# Patient Record
Sex: FEMALE | Race: WHITE | NOT HISPANIC OR LATINO | URBAN - METROPOLITAN AREA
[De-identification: names, ages, dates, MRNs, and addresses within clinical notes are randomized per-mention and may not be internally consistent; named-entity substitution may affect disease eponyms.]

---

## 2020-01-13 ENCOUNTER — EMERGENCY (EMERGENCY)
Facility: HOSPITAL | Age: 80
LOS: 1 days | Discharge: HOME | End: 2020-01-13
Attending: EMERGENCY MEDICINE
Payer: MEDICARE

## 2020-01-13 VITALS
HEART RATE: 99 BPM | HEIGHT: 63 IN | SYSTOLIC BLOOD PRESSURE: 112 MMHG | OXYGEN SATURATION: 97 % | RESPIRATION RATE: 16 BRPM | DIASTOLIC BLOOD PRESSURE: 68 MMHG | TEMPERATURE: 97 F | WEIGHT: 158.07 LBS

## 2020-01-13 LAB
ALBUMIN SERPL ELPH-MCNC: 3.7 G/DL — SIGNIFICANT CHANGE UP (ref 3.3–5.2)
ALP SERPL-CCNC: 106 U/L — SIGNIFICANT CHANGE UP (ref 40–120)
ALT FLD-CCNC: 14 U/L — SIGNIFICANT CHANGE UP
ANION GAP SERPL CALC-SCNC: 19 MMOL/L — HIGH (ref 5–17)
AST SERPL-CCNC: 20 U/L — SIGNIFICANT CHANGE UP
BASOPHILS # BLD AUTO: 0.02 K/UL — SIGNIFICANT CHANGE UP (ref 0–0.2)
BASOPHILS NFR BLD AUTO: 0.3 % — SIGNIFICANT CHANGE UP (ref 0–2)
BILIRUB SERPL-MCNC: 0.8 MG/DL — SIGNIFICANT CHANGE UP (ref 0.4–2)
BUN SERPL-MCNC: 24 MG/DL — HIGH (ref 8–20)
CALCIUM SERPL-MCNC: 9.6 MG/DL — SIGNIFICANT CHANGE UP (ref 8.6–10.2)
CHLORIDE SERPL-SCNC: 99 MMOL/L — SIGNIFICANT CHANGE UP (ref 98–107)
CK SERPL-CCNC: 92 U/L — SIGNIFICANT CHANGE UP (ref 25–170)
CO2 SERPL-SCNC: 24 MMOL/L — SIGNIFICANT CHANGE UP (ref 22–29)
CREAT SERPL-MCNC: 0.46 MG/DL — LOW (ref 0.5–1.3)
EOSINOPHIL # BLD AUTO: 0.03 K/UL — SIGNIFICANT CHANGE UP (ref 0–0.5)
EOSINOPHIL NFR BLD AUTO: 0.4 % — SIGNIFICANT CHANGE UP (ref 0–6)
GLUCOSE SERPL-MCNC: 237 MG/DL — HIGH (ref 70–115)
HCT VFR BLD CALC: 36.4 % — SIGNIFICANT CHANGE UP (ref 34.5–45)
HGB BLD-MCNC: 11.6 G/DL — SIGNIFICANT CHANGE UP (ref 11.5–15.5)
IMM GRANULOCYTES NFR BLD AUTO: 1.4 % — SIGNIFICANT CHANGE UP (ref 0–1.5)
LYMPHOCYTES # BLD AUTO: 0.81 K/UL — LOW (ref 1–3.3)
LYMPHOCYTES # BLD AUTO: 10.2 % — LOW (ref 13–44)
MAGNESIUM SERPL-MCNC: 1.6 MG/DL — SIGNIFICANT CHANGE UP (ref 1.6–2.6)
MCHC RBC-ENTMCNC: 27.8 PG — SIGNIFICANT CHANGE UP (ref 27–34)
MCHC RBC-ENTMCNC: 31.9 GM/DL — LOW (ref 32–36)
MCV RBC AUTO: 87.1 FL — SIGNIFICANT CHANGE UP (ref 80–100)
MONOCYTES # BLD AUTO: 0.57 K/UL — SIGNIFICANT CHANGE UP (ref 0–0.9)
MONOCYTES NFR BLD AUTO: 7.2 % — SIGNIFICANT CHANGE UP (ref 2–14)
NEUTROPHILS # BLD AUTO: 6.43 K/UL — SIGNIFICANT CHANGE UP (ref 1.8–7.4)
NEUTROPHILS NFR BLD AUTO: 80.5 % — HIGH (ref 43–77)
PLATELET # BLD AUTO: 237 K/UL — SIGNIFICANT CHANGE UP (ref 150–400)
POTASSIUM SERPL-MCNC: 3.3 MMOL/L — LOW (ref 3.5–5.3)
POTASSIUM SERPL-SCNC: 3.3 MMOL/L — LOW (ref 3.5–5.3)
PROT SERPL-MCNC: 7 G/DL — SIGNIFICANT CHANGE UP (ref 6.6–8.7)
RBC # BLD: 4.18 M/UL — SIGNIFICANT CHANGE UP (ref 3.8–5.2)
RBC # FLD: 13.7 % — SIGNIFICANT CHANGE UP (ref 10.3–14.5)
SODIUM SERPL-SCNC: 142 MMOL/L — SIGNIFICANT CHANGE UP (ref 135–145)
TROPONIN T SERPL-MCNC: <0.01 NG/ML — SIGNIFICANT CHANGE UP (ref 0–0.06)
WBC # BLD: 7.97 K/UL — SIGNIFICANT CHANGE UP (ref 3.8–10.5)
WBC # FLD AUTO: 7.97 K/UL — SIGNIFICANT CHANGE UP (ref 3.8–10.5)

## 2020-01-13 PROCEDURE — 72170 X-RAY EXAM OF PELVIS: CPT | Mod: 26

## 2020-01-13 PROCEDURE — 72195 MRI PELVIS W/O DYE: CPT | Mod: 26

## 2020-01-13 PROCEDURE — 74176 CT ABD & PELVIS W/O CONTRAST: CPT | Mod: 26

## 2020-01-13 PROCEDURE — 93010 ELECTROCARDIOGRAM REPORT: CPT

## 2020-01-13 PROCEDURE — 71045 X-RAY EXAM CHEST 1 VIEW: CPT | Mod: 26

## 2020-01-13 PROCEDURE — 99218: CPT

## 2020-01-13 RX ORDER — DEXTROSE 50 % IN WATER 50 %
25 SYRINGE (ML) INTRAVENOUS ONCE
Refills: 0 | Status: DISCONTINUED | OUTPATIENT
Start: 2020-01-13 | End: 2020-02-03

## 2020-01-13 RX ORDER — SODIUM CHLORIDE 9 MG/ML
1000 INJECTION, SOLUTION INTRAVENOUS
Refills: 0 | Status: DISCONTINUED | OUTPATIENT
Start: 2020-01-13 | End: 2020-02-03

## 2020-01-13 RX ORDER — DEXTROSE 50 % IN WATER 50 %
12.5 SYRINGE (ML) INTRAVENOUS ONCE
Refills: 0 | Status: DISCONTINUED | OUTPATIENT
Start: 2020-01-13 | End: 2020-02-03

## 2020-01-13 RX ORDER — SODIUM CHLORIDE 9 MG/ML
2000 INJECTION INTRAMUSCULAR; INTRAVENOUS; SUBCUTANEOUS ONCE
Refills: 0 | Status: COMPLETED | OUTPATIENT
Start: 2020-01-13 | End: 2020-01-13

## 2020-01-13 RX ORDER — ACETAMINOPHEN 500 MG
650 TABLET ORAL ONCE
Refills: 0 | Status: COMPLETED | OUTPATIENT
Start: 2020-01-13 | End: 2020-01-13

## 2020-01-13 RX ORDER — DEXTROSE 50 % IN WATER 50 %
15 SYRINGE (ML) INTRAVENOUS ONCE
Refills: 0 | Status: DISCONTINUED | OUTPATIENT
Start: 2020-01-13 | End: 2020-02-03

## 2020-01-13 RX ORDER — MAGNESIUM SULFATE 500 MG/ML
2 VIAL (ML) INJECTION ONCE
Refills: 0 | Status: COMPLETED | OUTPATIENT
Start: 2020-01-13 | End: 2020-01-13

## 2020-01-13 RX ORDER — INSULIN LISPRO 100/ML
VIAL (ML) SUBCUTANEOUS
Refills: 0 | Status: DISCONTINUED | OUTPATIENT
Start: 2020-01-13 | End: 2020-02-03

## 2020-01-13 RX ORDER — POTASSIUM CHLORIDE 20 MEQ
10 PACKET (EA) ORAL
Refills: 0 | Status: COMPLETED | OUTPATIENT
Start: 2020-01-13 | End: 2020-01-14

## 2020-01-13 RX ORDER — GLUCAGON INJECTION, SOLUTION 0.5 MG/.1ML
1 INJECTION, SOLUTION SUBCUTANEOUS ONCE
Refills: 0 | Status: DISCONTINUED | OUTPATIENT
Start: 2020-01-13 | End: 2020-02-03

## 2020-01-13 RX ADMIN — Medication 650 MILLIGRAM(S): at 22:00

## 2020-01-13 RX ADMIN — Medication 650 MILLIGRAM(S): at 21:01

## 2020-01-13 RX ADMIN — SODIUM CHLORIDE 2000 MILLILITER(S): 9 INJECTION INTRAMUSCULAR; INTRAVENOUS; SUBCUTANEOUS at 20:50

## 2020-01-13 NOTE — ED PROVIDER NOTE - CLINICAL SUMMARY MEDICAL DECISION MAKING FREE TEXT BOX
pt down for 4 days, nl mentation, c/o thirst, minimal pain to left abd no other pain. very dry, will check labs, cpk hydrate, reassess.

## 2020-01-13 NOTE — ED PROVIDER NOTE - OBJECTIVE STATEMENT
80 y/o female hx htn, dm, recent right hip fracture with surgery biba s/p mechanical fall 4 days ago and being unable to get help. Was in rehab for 2 months for hip fracture/surgery, d/troy home 4 days ago, fell on first day, unable to get to phone for help. States was using rollator and it slipped out from her, couldn't pickk herself up, no one else lives with her. Denies hitting head, no headache/loc, no a/c. Finally able to call 911 today. Did not eat/drink/take meds for 4 days. C/o mild pain to left upper/mid abd region. C/o bein very thirsty.    ROS: No fever/chills. No eye pain/changes in vision, No ear pain/sore throat/dysphagia, No chest pain/palpitations. No SOB/cough/. No N/V/D, no black/bloody bm. No dysuria/frequency/discharge, No headache. No Dizziness.    No rashes or breaks in skin. No numbness/tingling/weakness.

## 2020-01-13 NOTE — ED PROVIDER NOTE - PROGRESS NOTE DETAILS
Vi: pt with no pain to hip with movement, will get mri for further eval to ensure no periprosthetic fracture. dehydrated, continue fluids. pt consult given pt fell first day home. to obs.

## 2020-01-13 NOTE — ED ADULT TRIAGE NOTE - CHIEF COMPLAINT QUOTE
Pt fell 5 days ago while attempting to get off the toilet. States she did not call EMS earlier because she was "indecent." Pt states that she was able to crawl to the telephone today and call EMS. Pt has been on floor without much water, food, or medications x 5 days. Sacral redness present. Denies hitting head. C/O back pain.

## 2020-01-13 NOTE — ED CDU PROVIDER INITIAL DAY NOTE - ATTENDING CONTRIBUTION TO CARE
I agree with the PA's note and was available for any issues/concerns. I was directly involved in patient care. My brief overall assessment is as follows: obs for ortho and PT/SW to see as pt difficulty walking at home.

## 2020-01-13 NOTE — ED PROVIDER NOTE - PHYSICAL EXAMINATION
Gen: No acute distress, non toxic  HEENT: very dry mucus membranes, pink conjunctivae, EOMI. NCAT  Neck: no midline ttp  CV: RRR, nl s1/s2.  Resp: CTAB, normal rate and effort  GI: abd soft, no bruising, minimal left sided ttp, no rebound/guarding  : No CVAT  Neuro: A&O x 3, moving all 4 extremities  MSK: No spine or joint tenderness to palpation. old surgical scar right hip, no vel ttp, full rom. neurovasuclarly intact.   Skin: pressure/redness to sacral region, no full breakdown of skin.

## 2020-01-14 VITALS
SYSTOLIC BLOOD PRESSURE: 126 MMHG | HEART RATE: 95 BPM | DIASTOLIC BLOOD PRESSURE: 78 MMHG | TEMPERATURE: 99 F | OXYGEN SATURATION: 96 % | RESPIRATION RATE: 18 BRPM

## 2020-01-14 LAB
ANION GAP SERPL CALC-SCNC: 14 MMOL/L — SIGNIFICANT CHANGE UP (ref 5–17)
APPEARANCE UR: ABNORMAL
BACTERIA # UR AUTO: ABNORMAL
BILIRUB UR-MCNC: NEGATIVE — SIGNIFICANT CHANGE UP
BUN SERPL-MCNC: 20 MG/DL — SIGNIFICANT CHANGE UP (ref 8–20)
CALCIUM SERPL-MCNC: 8.9 MG/DL — SIGNIFICANT CHANGE UP (ref 8.6–10.2)
CHLORIDE SERPL-SCNC: 104 MMOL/L — SIGNIFICANT CHANGE UP (ref 98–107)
CO2 SERPL-SCNC: 24 MMOL/L — SIGNIFICANT CHANGE UP (ref 22–29)
COLOR SPEC: YELLOW — SIGNIFICANT CHANGE UP
CREAT SERPL-MCNC: 0.38 MG/DL — LOW (ref 0.5–1.3)
DIFF PNL FLD: ABNORMAL
EPI CELLS # UR: SIGNIFICANT CHANGE UP
GLUCOSE BLDC GLUCOMTR-MCNC: 166 MG/DL — HIGH (ref 70–99)
GLUCOSE BLDC GLUCOMTR-MCNC: 211 MG/DL — HIGH (ref 70–99)
GLUCOSE BLDC GLUCOMTR-MCNC: 256 MG/DL — HIGH (ref 70–99)
GLUCOSE SERPL-MCNC: 169 MG/DL — HIGH (ref 70–115)
GLUCOSE UR QL: 100 MG/DL
HBA1C BLD-MCNC: 6.6 % — HIGH (ref 4–5.6)
KETONES UR-MCNC: ABNORMAL
LEUKOCYTE ESTERASE UR-ACNC: ABNORMAL
NITRITE UR-MCNC: NEGATIVE — SIGNIFICANT CHANGE UP
PH UR: 5 — SIGNIFICANT CHANGE UP (ref 5–8)
POTASSIUM SERPL-MCNC: 3.7 MMOL/L — SIGNIFICANT CHANGE UP (ref 3.5–5.3)
POTASSIUM SERPL-SCNC: 3.7 MMOL/L — SIGNIFICANT CHANGE UP (ref 3.5–5.3)
PROT UR-MCNC: 30 MG/DL
RBC CASTS # UR COMP ASSIST: ABNORMAL /HPF (ref 0–4)
SODIUM SERPL-SCNC: 142 MMOL/L — SIGNIFICANT CHANGE UP (ref 135–145)
SP GR SPEC: 1.02 — SIGNIFICANT CHANGE UP (ref 1.01–1.02)
UROBILINOGEN FLD QL: 1 MG/DL
WBC UR QL: >50

## 2020-01-14 PROCEDURE — 80053 COMPREHEN METABOLIC PANEL: CPT

## 2020-01-14 PROCEDURE — 73552 X-RAY EXAM OF FEMUR 2/>: CPT | Mod: 26,RT

## 2020-01-14 PROCEDURE — 96366 THER/PROPH/DIAG IV INF ADDON: CPT

## 2020-01-14 PROCEDURE — 99217: CPT

## 2020-01-14 PROCEDURE — 81001 URINALYSIS AUTO W/SCOPE: CPT

## 2020-01-14 PROCEDURE — 93005 ELECTROCARDIOGRAM TRACING: CPT

## 2020-01-14 PROCEDURE — 74176 CT ABD & PELVIS W/O CONTRAST: CPT

## 2020-01-14 PROCEDURE — 82962 GLUCOSE BLOOD TEST: CPT

## 2020-01-14 PROCEDURE — 96376 TX/PRO/DX INJ SAME DRUG ADON: CPT

## 2020-01-14 PROCEDURE — 80048 BASIC METABOLIC PNL TOTAL CA: CPT

## 2020-01-14 PROCEDURE — 85027 COMPLETE CBC AUTOMATED: CPT

## 2020-01-14 PROCEDURE — 71045 X-RAY EXAM CHEST 1 VIEW: CPT

## 2020-01-14 PROCEDURE — 73501 X-RAY EXAM HIP UNI 1 VIEW: CPT

## 2020-01-14 PROCEDURE — 73552 X-RAY EXAM OF FEMUR 2/>: CPT

## 2020-01-14 PROCEDURE — 73501 X-RAY EXAM HIP UNI 1 VIEW: CPT | Mod: 26,LT,76

## 2020-01-14 PROCEDURE — 72170 X-RAY EXAM OF PELVIS: CPT

## 2020-01-14 PROCEDURE — 83036 HEMOGLOBIN GLYCOSYLATED A1C: CPT

## 2020-01-14 PROCEDURE — 82550 ASSAY OF CK (CPK): CPT

## 2020-01-14 PROCEDURE — 96365 THER/PROPH/DIAG IV INF INIT: CPT

## 2020-01-14 PROCEDURE — 72195 MRI PELVIS W/O DYE: CPT

## 2020-01-14 PROCEDURE — 96375 TX/PRO/DX INJ NEW DRUG ADDON: CPT

## 2020-01-14 PROCEDURE — 83735 ASSAY OF MAGNESIUM: CPT

## 2020-01-14 PROCEDURE — 99285 EMERGENCY DEPT VISIT HI MDM: CPT | Mod: 25

## 2020-01-14 PROCEDURE — 87186 SC STD MICRODIL/AGAR DIL: CPT

## 2020-01-14 PROCEDURE — G0378: CPT

## 2020-01-14 PROCEDURE — 87086 URINE CULTURE/COLONY COUNT: CPT

## 2020-01-14 PROCEDURE — 36415 COLL VENOUS BLD VENIPUNCTURE: CPT

## 2020-01-14 PROCEDURE — 84484 ASSAY OF TROPONIN QUANT: CPT

## 2020-01-14 RX ORDER — CEFTRIAXONE 500 MG/1
1000 INJECTION, POWDER, FOR SOLUTION INTRAMUSCULAR; INTRAVENOUS ONCE
Refills: 0 | Status: COMPLETED | OUTPATIENT
Start: 2020-01-14 | End: 2020-01-14

## 2020-01-14 RX ADMIN — Medication 2: at 06:00

## 2020-01-14 RX ADMIN — Medication 50 GRAM(S): at 00:21

## 2020-01-14 RX ADMIN — Medication 100 MILLIEQUIVALENT(S): at 00:22

## 2020-01-14 RX ADMIN — Medication 100 MILLIEQUIVALENT(S): at 01:43

## 2020-01-14 RX ADMIN — CEFTRIAXONE 1000 MILLIGRAM(S): 500 INJECTION, POWDER, FOR SOLUTION INTRAMUSCULAR; INTRAVENOUS at 18:16

## 2020-01-14 RX ADMIN — Medication 6: at 13:25

## 2020-01-14 RX ADMIN — CEFTRIAXONE 100 MILLIGRAM(S): 500 INJECTION, POWDER, FOR SOLUTION INTRAMUSCULAR; INTRAVENOUS at 12:36

## 2020-01-14 RX ADMIN — Medication 100 MILLIEQUIVALENT(S): at 02:45

## 2020-01-14 NOTE — PHYSICAL THERAPY INITIAL EVALUATION ADULT - PERTINENT HX OF CURRENT PROBLEM, REHAB EVAL
78 y/o female hx htn, dm, recent right hip fracture with surgery biba s/p mechanical fall 4 days ago and being unable to get help. Was in rehab for 2 months for hip fracture/surgery, fell on first day home after rehab.

## 2020-01-14 NOTE — PROVIDER CONTACT NOTE (OTHER) - RECOMMENDATIONS
D/C recommendation [facility]. Short term goals 92-3 visits): bed mobility min assist x1, transfers: sit to stand with RW supervision with verbal cues, Gait: Amb 75 ft with RW with contact guard

## 2020-01-14 NOTE — CHART NOTE - NSCHARTNOTEFT_GEN_A_CORE
SOCIAL WORK NOTE:  THIS WORKER CIRCULATED THE JOSE LUIS UPON COMPLETION TO MULTIPLE LOCAL FACILITES.  RECEIVED NOTIFICATION FROM SNFS THAT PATIENT HAS 35 DAYS LEFT UNDER MEDICARE AND SHE IS IN COPAYMENT DAYS.  ONLY ACCEPTING SNF AT THIS TIME IS [facility].  SPOKE WITH SIVAN IN ADMISSIONS WHOM INDICATED PATIENT WAS THERE FROM NOVEMBER 2019 THROUGH 1/9/2020 AMD THEY CAN ACCEPT PATIENT BACK.  PATIENT HAD ERRONEOUSLY STATED [facility] BUT UPON EXPLAINING HER BED OFFER, PATIENT REMEMEBERED WHERE SHE WAS AND REALLY WAS NOT HAPPY TO RETURN THERE BECAUSE SHE DID NOT LIKE THE FOOD.  CASE MANAGEMENT MET WITH PATIENT TO DISCUSS AFFINITY BEING HER ONLY BED OFFER AS SHE HAS USED SO MANY MEDICARE DAYS IN ANOTHER FACILITY. PATIENT EXPRESSED UNDERSTANDING. AMBUALNCE ARRANGED FOR 5 PM PICKUP AND TRANSPORT TO [facility].

## 2020-01-14 NOTE — ED CDU PROVIDER DISPOSITION NOTE - CARE PROVIDER_API CALL
Ramses Buitrago (DO)  Orthopaedic Surgery  [address] [place] [address]  Phone: [phone]  Fax: [phone]  Follow Up Time: 7-10 Days

## 2020-01-14 NOTE — PHYSICAL THERAPY INITIAL EVALUATION ADULT - ADDITIONAL COMMENTS
Pt. lives alone in an apartment with no stairs. Pt. was modified independent with a rollator prior to admission. Also owns a RW, shower chair, and commode.

## 2020-01-14 NOTE — ED ADULT NURSE NOTE - NSIMPLEMENTINTERV_GEN_ALL_ED
Implemented All Fall Risk Interventions:  [place] to call system. Call bell, personal items and telephone within reach. Instruct patient to call for assistance. Room bathroom lighting operational. Non-slip footwear when patient is off stretcher. Physically safe environment: no spills, clutter or unnecessary equipment. Stretcher in lowest position, wheels locked, appropriate side rails in place. Provide visual cue, wrist band, yellow gown, etc. Monitor gait and stability. Monitor for mental status changes and reorient to person, place, and time. Review medications for side effects contributing to fall risk. Reinforce activity limits and safety measures with patient and family.

## 2020-01-14 NOTE — PROGRESS NOTE ADULT - SUBJECTIVE AND OBJECTIVE BOX
Pt Name: ERIKA ORTIZ    MRN: [id number]      Patient is a 79y Female presenting to the emergency department with a chief complaint of fall.    Patient is a 79y old  Female who presents with a chief complaint of left sided pain after lying on the floor for a few days. She reports that she went to sit on a rolling walker. She does not specifically report of hip pain when questioned. She lives alone at home but was recently discharged home from inpatient rehabilitation. Patient unable to provide any clear history regarding previous orthopedic surgeries. No paperwork from home with surgical history. Probable recent intramedullary nail fixation of the right hip given recent [facility] admission.       REVIEW OF SYSTEMS    General: Alert, responsive, in NAD    Skin/Breast: No rashes, no pruritis   	  Ophthalmologic: No visual changes. No redness.   	  ENMT:	No discharge. No swelling.    Respiratory and Thorax: No difficulty breathing. No cough.  	   Cardiovascular:	No chest pain. No palpitations.    Gastrointestinal:	 No abdominal pain. No diarrhea.     Genitourinary: No dysuria. No bleeding.    Musculoskeletal: SEE HPI.    Neurological: No sensory or motor changes.     Psychiatric: No anxiety or depression.    Hematology/Lymphatics: No swelling.    Endocrine: No Hx of diabetes.        PAST MEDICAL & SURGICAL HISTORY: UNKNOWN      Allergies: No Known Allergies      Medications: cefTRIAXone   IVPB 1000 milliGRAM(s) IV Intermittent once  dextrose 40% Gel 15 Gram(s) Oral once PRN  dextrose 5%. 1000 milliLiter(s) IV Continuous <Continuous>  dextrose 50% Injectable 12.5 Gram(s) IV Push once  dextrose 50% Injectable 25 Gram(s) IV Push once  dextrose 50% Injectable 25 Gram(s) IV Push once  glucagon  Injectable 1 milliGRAM(s) IntraMuscular once PRN  insulin lispro (HumaLOG) corrective regimen sliding scale   SubCutaneous three times a day before meals      FAMILY HISTORY:  : non-contributory    Social History:     Ambulation: Walking independently [ ] With Cane [ ] With Walker [x]  Bedbound [ ]                           11.6   7.97  )-----------( 237      ( 13 Jan 2020 21:10 )             36.4       01-14    142  |  104  |  20.0  ----------------------------<  169<H>  3.7   |  24.0  |  0.38<L>    Ca    8.9      14 Jan 2020 06:48  Mg     1.6     01-13    TPro  7.0  /  Alb  3.7  /  TBili  0.8  /  DBili  x   /  AST  20  /  ALT  14  /  AlkPhos  106  01-13      Vital Signs Last 24 Hrs  T(C): 36.8 (14 Jan 2020 08:04), Max: 36.9 (14 Jan 2020 02:30)  T(F): 98.2 (14 Jan 2020 08:04), Max: 98.4 (14 Jan 2020 02:30)  HR: 91 (14 Jan 2020 08:04) (88 - 99)  BP: 118/64 (14 Jan 2020 08:04) (112/68 - 130/78)  BP(mean): --  RR: 19 (14 Jan 2020 08:04) (16 - 19)  SpO2: 94% (14 Jan 2020 08:04) (94% - 97%)    Daily Height in cm: 160.02 (13 Jan 2020 18:00)    Daily       PHYSICAL EXAM:      Appearance: Alert, responsive, in no acute distress.    Neurological: Sensation is grossly intact to light touch. 5/5 motor function of all extremities. No focal deficits or weaknesses found.    Skin:  + left hip erythema and large (6cm) abrasion c/w pressure injury from fall. No ecchymosis. No surgical scars. + Left knee scar c/w total knee arthroplasty. + healed right hip scars c/w intramedullary nail fixation. No signs of acute trauma. no rash on visible skin. Skin is clean, dry and intact. No bleeding. No abrasions. No ulcerations.    Vascular: 2+ distal pulses. Cap refill < 2 sec. No signs of venous insufficiency or stasis. No extremity ulcerations. No cyanosis.    Musculoskeletal:         Left Upper Extremity:  + NROM. Non-tender. No signs of trauma.        Right Upper Extremity:  + NROM. Non-tender. No signs of trauma.        Left Lower Extremity:  + NROM. Non-tender. No signs of acute trauma. NROM of the left hip and knee. No knee effusions. No crepitus. No hip or knee tenderness. No pain with axial loading of the hip. No calf tenderness.        Right Lower Extremity:  + NROM. Non-tender. No signs of acute trauma. NROM of the left hip and knee. No knee effusions. No crepitus. No hip or knee tenderness. No pain with axial loading of the hip. No calf tenderness.     Imaging Studies:    XR's- Pelvis & hips: + intramedullary nail fixation with IT callous formation. No acute periprosthetic hip fractures noted. No left hip Fx.     Right femur: + Right total knee arthroplasty noted. No PPFX noted.     < from: MR Pelvis Bony Only No Cont (01.13.20 @ 23:21) >     EXAM:  MR PELVIS BONY ONLY                          *** ADDENDUM 01/14/2020  ***    Addendum    Additional clinical history is provided. The team is unaware of the age of the right hip ORIF as the patient is deemed confused.    Please note the ORIF and a marrow edema is present on the right. No left hip fracture is appreciated. There is mild soft tissue edema over the left hip.    Findings were discussed with THU Dang on 1/14/2020 11:01 AM  with read back.    End addendum      *** END OF ADDENDUM 01/14/2020  ***      PROCEDURE DATE:  01/13/2020          INTERPRETATION:  MR PELVIS BONY dated 1/13/2020 11:21 PM     INDICATION: Pain after fall. History of ORIF on the right hip.    COMPARISON: Pelvis radiographs of earlier the same date. CT of the abdomen and pelvis of earlier the same date.    TECHNIQUE: Multi-sequential, multiplanar MRI imaging of the pelvis was performed per standard protocol.     FINDINGS:    BONE MARROW: The patient is status post right hip ORIF with intramedullary joy and transcervical screw in place. There is mild susceptibility artifact in keeping with the type and orientation the hardware. Given this limitation, there is edema and linear T1 signal hypointensity within the posterior aspect of the greater tuberosity suspicious for acute greater tuberosity fracture. There is linear T1 signal hypointensity and edema appears to extend towards the base of the femoral neck. There is mild to moderate bilateral narrowing of the hip joint spaces with chondral irregularity. There is mild narrowing at the symphysis.  SYNOVIUM/ JOINT FLUID: No hip joint effusion seen.  TENDONS: Suspected chronic avulsion injury of the right proximal hamstring tendon is the right proximal hamstring tendon is not visualized on this study. The left proximal fashion tendon demonstrates mild to moderate tendinosis. There is edema within the right gluteus medius tendon. The tendons are otherwise intact.  MUSCLES: Moderate atrophy of the musculature. No intramuscular hematoma.  NEUROVASCULAR STRUCTURES: Preserved  SUBCUTANEOUS SOFT TISSUES: No significant soft tissue swelling.  INTRAPELVIC SOFT TISSUES: No abnormality noted. Several scattered colonic diverticula are noted.    IMPRESSION:    1.  Linear T1 signal hypointensity withmarrow edema corresponding to areas of cortical disruption on recent CT the abdomen and pelvis. The findings are suspicious for a subacute/acute fracture. Correlate with surgical history.  2.  Old avulsion of the right proximal hamstring tendon. Moderate left proximal/tendinosis.  3.  Mild to moderate right gluteus medius tendinosis.    Findings were discussed with THU Sharma: on 1/14/2020 8:43 AM  with read back.      ***Please see the addendum at the top of this report. It may contain additionalimportant information or changes.****          MANISHA LOVE M.D., ATTENDING RADIOLOGIST  This document has been electronically signed. Jan 14 2020  8:45AM  Addend:  MANISHA LOVE M.D., ATTENDING RADIOLOGIST  This addendum was electronically signed on: Jan 14 2020 11:02AM.        A/P:  Pt is a  79y Female s/p fall found to have previous right hip intramedullary nail fixation and a right total knee arthroplasty     PLAN:   * No acute fractures are noted  * Weight bearing of LE as tolerated with use of a walker  * Office follow-up within 2 weeks

## 2020-01-14 NOTE — ED CDU PROVIDER SUBSEQUENT DAY NOTE - PROGRESS NOTE DETAILS
Pt received from [facility]. PT evaluated at bedside with Dr. De Jesus. Pt reports she fell at home, states pain is located in left hip. MRI hip with possible fx, spoke to Orthopedics PA, pending evaluation. Pt has stage 1 located in sacral area, will apply barrier cream. + UTI, will tx. Per Orthopedics, "No acute fractures are noted, WBAT with walker, f/u outpatient orthopedist 2 weeks." Pending placement per case management. THU Gonzalez NOTE: Reviewed all results with alicia Daley tx UTI with keflex, f/u orthopedist. Case management arranged placement at [facility] and transportation. Pt stable for d/c, reports improvement, VSS, tolerating PO, ambulatory.  Discussion includes results, plan, proper medication use/side effects, and return precautions. Pt advised to f/u with PMD 1-2 days and specialists discussed.  Pt given printed copies of lab/radiology for outpt follow up. Pt verbalized understanding/agreement of plan.

## 2020-01-14 NOTE — PROVIDER CONTACT NOTE (OTHER) - ASSESSMENT
As per d/w Edith ANDINO, preliminary results prior to PT eval negative for fx, however, MRI reading after eval reporting subacute vs acute fx. PA reporting x ray orders to be placed and to hold further tx until results from x ray available.

## 2020-01-14 NOTE — ED CDU PROVIDER DISPOSITION NOTE - NSFOLLOWUPINSTRUCTIONS_ED_ALL_ED_FT
Please follow up with your Primary doctor in 24-48 hr.  Seek immediate medical care for any new and/or worsening signs or symptoms.   You have a urinary tract infection, you will require antibiotics from the facility. Recommend Keflex 4 times/day for 7 days.  Follow up with Orthopedist in 2 weeks    Urinary Tract Infection    A urinary tract infection (UTI) is an infection of any part of the urinary tract, which includes the kidneys, ureters, bladder, and urethra. Risk factors include ignoring your need to urinate, wiping back to front if female, being an uncircumcised male, and having diabetes or a weak immune system. Symptoms include frequent urination, pain or burning with urination, foul smelling urine, cloudy urine, pain in the lower abdomen, blood in the urine, and fever. If you were prescribed an antibiotic medicine, take it as told by your health care provider. Do not stop taking the antibiotic even if you start to feel better.    SEEK IMMEDIATE MEDICAL CARE IF YOU HAVE ANY OF THE FOLLOWING SYMPTOMS: severe back or abdominal pain, fever, inability to keep fluids or medicine down, dizziness/lightheadedness, or a change in mental status.

## 2020-01-14 NOTE — PROVIDER CONTACT NOTE (OTHER) - BACKGROUND
Pt. educated on use of call bell for all needs/transfers with assist. Pt. with c/o 3/10 pain back pre and post session - positioned for comfort. PT will follow.

## 2020-01-14 NOTE — CHART NOTE - NSCHARTNOTEFT_GEN_A_CORE
SOCIAL WORK NOTE:  PATIENT PLACED ON OBSERVATION FOR POTENTIAL DC PLAN OF [facility].  CASE MANAGEMENT MET WITH PATIENT WHOM IS ALERT AND ORIENTED.  PATIENT REPORTED THAT SHE LIVES ALONE AND WAS RECENTLY AT [facility] FOR [facility] X 2 MONTHS.  PATIENT REPORTS THAT SHE WAS DISCHARGED TO HOME ON 1/9/2020. PATIENT REPORTS THAT SHE TOOK A FALL.  PT EVALUATION IS RECOMMENDING REHAB.  PATIENT IS RECEPTIVE TO [facility] AGAIN BUT WOULD PREFER ANOTHER LOCATION OTHER THAN [place]. PATIENT IS UNDERSTANDING IF SHE HAS TO GO BACK THERE DUE TO LARGE AMOUNT OF MEDICARE DAYS SHE HAS USED UP WITH MEDICARE.  PLAN ON HOLD AT THIS TIME AS PATIENT IS BEING WORKED UP MEDICALLY STILL.

## 2020-01-14 NOTE — ED CDU PROVIDER DISPOSITION NOTE - ATTENDING CONTRIBUTION TO CARE
I have personally performed a face to face diagnostic evaluation on this patient.  I have reviewed the ACP note and agree with the history, exam, and plan of care.

## 2020-01-14 NOTE — PROVIDER CONTACT NOTE (OTHER) - SITUATION
Chart reviewed, contents noted. MD orders received. PT evaluation completed after speaking with LEAH Polk. Pt. returned to bed in ED + call bell, in no apparent distress.

## 2020-01-14 NOTE — ED CDU PROVIDER SUBSEQUENT DAY NOTE - ATTENDING CONTRIBUTION TO CARE
78 y/o female hx htn, dm, recent right hip fracture with surgery biba s/p mechanical fall 4 days ago and being unable to get help. Was in rehab for 2 months for hip fracture/surgery, d/troy home 4 days ago, fell on first day, unable to get to phone for help. States was using rollator and it slipped out from her, couldn't pickk herself up, no one else lives with her. Denies hitting head, no headache/loc, no a/c.  Plan:  -awaiting MR results  -Ortho  -PT  -Case Management for placement

## 2020-01-14 NOTE — ED CDU PROVIDER DISPOSITION NOTE - CLINICAL COURSE
78 y/o F with PMHx DM, recent right hip fracture with surgery biba s/p mechanical fall 4 days ago and being unable to get help. Was in rehab for 2 months for hip fracture/surgery, d/troy home 4 days ago, fell on first day. CT and MRI with possible fracture, seen by Ortho, per ortho no acute fracture. Pt with UTI, will give rocephin. Pt will be discharge to a [facility], arranged by case management.

## 2020-01-14 NOTE — ED ADULT NURSE REASSESSMENT NOTE - NS ED NURSE REASSESS COMMENT FT1
Patient eating lunch, negative complaints at this time. Will monitor.
Per physical therapist, will request pt to go to in-patient rehab.
received report from josue guidry, pt laying in stretcher, no s.s of acute distress, pt awake and alert, pt safety maintained.
received report from josue guidry, pt laying in stretcher, no s/s of acute distress, pending md rivera, pt safety maintained.
Pt remains A & OX4, breathing even & unlabored, offers no complaints.
Assumed pt care at this time. Pt A & Ox4, breathing even & unlabored, pt offers no complaints waiting for PT consult, aware of plan of care.

## 2020-01-16 LAB
-  AMIKACIN: SIGNIFICANT CHANGE UP
-  AMIKACIN: SIGNIFICANT CHANGE UP
-  AMPICILLIN/SULBACTAM: SIGNIFICANT CHANGE UP
-  AMPICILLIN/SULBACTAM: SIGNIFICANT CHANGE UP
-  AMPICILLIN: SIGNIFICANT CHANGE UP
-  AMPICILLIN: SIGNIFICANT CHANGE UP
-  AZTREONAM: SIGNIFICANT CHANGE UP
-  AZTREONAM: SIGNIFICANT CHANGE UP
-  CEFAZOLIN: SIGNIFICANT CHANGE UP
-  CEFAZOLIN: SIGNIFICANT CHANGE UP
-  CEFEPIME: SIGNIFICANT CHANGE UP
-  CEFEPIME: SIGNIFICANT CHANGE UP
-  CEFOXITIN: SIGNIFICANT CHANGE UP
-  CEFOXITIN: SIGNIFICANT CHANGE UP
-  CEFTRIAXONE: SIGNIFICANT CHANGE UP
-  CEFTRIAXONE: SIGNIFICANT CHANGE UP
-  CIPROFLOXACIN: SIGNIFICANT CHANGE UP
-  CIPROFLOXACIN: SIGNIFICANT CHANGE UP
-  GENTAMICIN: SIGNIFICANT CHANGE UP
-  GENTAMICIN: SIGNIFICANT CHANGE UP
-  IMIPENEM: SIGNIFICANT CHANGE UP
-  LEVOFLOXACIN: SIGNIFICANT CHANGE UP
-  LEVOFLOXACIN: SIGNIFICANT CHANGE UP
-  MEROPENEM: SIGNIFICANT CHANGE UP
-  MEROPENEM: SIGNIFICANT CHANGE UP
-  NITROFURANTOIN: SIGNIFICANT CHANGE UP
-  NITROFURANTOIN: SIGNIFICANT CHANGE UP
-  PIPERACILLIN/TAZOBACTAM: SIGNIFICANT CHANGE UP
-  PIPERACILLIN/TAZOBACTAM: SIGNIFICANT CHANGE UP
-  TIGECYCLINE: SIGNIFICANT CHANGE UP
-  TOBRAMYCIN: SIGNIFICANT CHANGE UP
-  TOBRAMYCIN: SIGNIFICANT CHANGE UP
-  TRIMETHOPRIM/SULFAMETHOXAZOLE: SIGNIFICANT CHANGE UP
-  TRIMETHOPRIM/SULFAMETHOXAZOLE: SIGNIFICANT CHANGE UP
CULTURE RESULTS: SIGNIFICANT CHANGE UP
METHOD TYPE: SIGNIFICANT CHANGE UP
METHOD TYPE: SIGNIFICANT CHANGE UP
ORGANISM # SPEC MICROSCOPIC CNT: SIGNIFICANT CHANGE UP
SPECIMEN SOURCE: SIGNIFICANT CHANGE UP

## 2020-01-17 RX ORDER — CEPHALEXIN 500 MG
1 CAPSULE ORAL
Qty: 28 | Refills: 0
Start: 2020-01-17 | End: 2020-01-23

## 2020-01-17 NOTE — ED POST DISCHARGE NOTE - DETAILS
letter sent as per THU CUNHA pt went to [facility] with discharge papers with instructions on keflex + VARGAS, Spoke to nurse Chauhan at [facility], informed of results, will fax over sensitivities so facility can complete abx.

## 2020-01-24 NOTE — ED PROVIDER NOTE - CARE PLAN
<--- Click to Launch ICDx for PreOp, PostOp and Procedure
Principal Discharge DX:	Dehydration  Secondary Diagnosis:	Hypokalemia

## 2020-12-21 ENCOUNTER — RESULT REVIEW (OUTPATIENT)
Age: 80
End: 2020-12-21

## 2020-12-22 ENCOUNTER — INPATIENT (INPATIENT)
Facility: HOSPITAL | Age: 80
LOS: 9 days | Discharge: HOME | DRG: 287 | End: 2021-01-01
Attending: INTERNAL MEDICINE | Admitting: STUDENT IN AN ORGANIZED HEALTH CARE EDUCATION/TRAINING PROGRAM
Payer: MEDICARE

## 2020-12-22 VITALS
SYSTOLIC BLOOD PRESSURE: 102 MMHG | TEMPERATURE: 98 F | HEART RATE: 87 BPM | HEIGHT: 63 IN | OXYGEN SATURATION: 94 % | WEIGHT: 154.1 LBS | RESPIRATION RATE: 16 BRPM | DIASTOLIC BLOOD PRESSURE: 64 MMHG

## 2020-12-22 DIAGNOSIS — Z98.890 OTHER SPECIFIED POSTPROCEDURAL STATES: Chronic | ICD-10-CM

## 2020-12-22 DIAGNOSIS — I50.9 HEART FAILURE, UNSPECIFIED: ICD-10-CM

## 2020-12-22 DIAGNOSIS — Z90.49 ACQUIRED ABSENCE OF OTHER SPECIFIED PARTS OF DIGESTIVE TRACT: Chronic | ICD-10-CM

## 2020-12-22 DIAGNOSIS — J90 PLEURAL EFFUSION, NOT ELSEWHERE CLASSIFIED: ICD-10-CM

## 2020-12-22 LAB
BASOPHILS # BLD AUTO: 0.02 K/UL — SIGNIFICANT CHANGE UP (ref 0–0.2)
BASOPHILS NFR BLD AUTO: 0.3 % — SIGNIFICANT CHANGE UP (ref 0–2)
EOSINOPHIL # BLD AUTO: 0.01 K/UL — SIGNIFICANT CHANGE UP (ref 0–0.5)
EOSINOPHIL NFR BLD AUTO: 0.1 % — SIGNIFICANT CHANGE UP (ref 0–6)
HCT VFR BLD CALC: 35.3 % — SIGNIFICANT CHANGE UP (ref 34.5–45)
HGB BLD-MCNC: 10.4 G/DL — LOW (ref 11.5–15.5)
IMM GRANULOCYTES NFR BLD AUTO: 0.1 % — SIGNIFICANT CHANGE UP (ref 0–1.5)
LYMPHOCYTES # BLD AUTO: 1.07 K/UL — SIGNIFICANT CHANGE UP (ref 1–3.3)
LYMPHOCYTES # BLD AUTO: 14.1 % — SIGNIFICANT CHANGE UP (ref 13–44)
MCHC RBC-ENTMCNC: 26.2 PG — LOW (ref 27–34)
MCHC RBC-ENTMCNC: 29.5 GM/DL — LOW (ref 32–36)
MCV RBC AUTO: 88.9 FL — SIGNIFICANT CHANGE UP (ref 80–100)
MONOCYTES # BLD AUTO: 0.65 K/UL — SIGNIFICANT CHANGE UP (ref 0–0.9)
MONOCYTES NFR BLD AUTO: 8.6 % — SIGNIFICANT CHANGE UP (ref 2–14)
NEUTROPHILS # BLD AUTO: 5.83 K/UL — SIGNIFICANT CHANGE UP (ref 1.8–7.4)
NEUTROPHILS NFR BLD AUTO: 76.8 % — SIGNIFICANT CHANGE UP (ref 43–77)
PLATELET # BLD AUTO: 131 K/UL — LOW (ref 150–400)
RAPID RVP RESULT: SIGNIFICANT CHANGE UP
RBC # BLD: 3.97 M/UL — SIGNIFICANT CHANGE UP (ref 3.8–5.2)
RBC # FLD: 18 % — HIGH (ref 10.3–14.5)
SARS-COV-2 RNA SPEC QL NAA+PROBE: SIGNIFICANT CHANGE UP
WBC # BLD: 7.59 K/UL — SIGNIFICANT CHANGE UP (ref 3.8–10.5)
WBC # FLD AUTO: 7.59 K/UL — SIGNIFICANT CHANGE UP (ref 3.8–10.5)

## 2020-12-22 PROCEDURE — 99221 1ST HOSP IP/OBS SF/LOW 40: CPT

## 2020-12-22 PROCEDURE — 71250 CT THORAX DX C-: CPT | Mod: 26

## 2020-12-22 PROCEDURE — 88305 TISSUE EXAM BY PATHOLOGIST: CPT | Mod: 26

## 2020-12-22 PROCEDURE — 93010 ELECTROCARDIOGRAM REPORT: CPT

## 2020-12-22 PROCEDURE — 88112 CYTOPATH CELL ENHANCE TECH: CPT | Mod: 26

## 2020-12-22 PROCEDURE — 71046 X-RAY EXAM CHEST 2 VIEWS: CPT | Mod: 26

## 2020-12-22 PROCEDURE — 99223 1ST HOSP IP/OBS HIGH 75: CPT

## 2020-12-22 PROCEDURE — 99285 EMERGENCY DEPT VISIT HI MDM: CPT | Mod: CS

## 2020-12-22 RX ORDER — MECLIZINE HCL 12.5 MG
12.5 TABLET ORAL THREE TIMES A DAY
Refills: 0 | Status: DISCONTINUED | OUTPATIENT
Start: 2020-12-22 | End: 2020-12-26

## 2020-12-22 RX ORDER — LISINOPRIL 2.5 MG/1
2.5 TABLET ORAL DAILY
Refills: 0 | Status: DISCONTINUED | OUTPATIENT
Start: 2020-12-22 | End: 2020-12-26

## 2020-12-22 RX ORDER — PANTOPRAZOLE SODIUM 20 MG/1
40 TABLET, DELAYED RELEASE ORAL
Refills: 0 | Status: DISCONTINUED | OUTPATIENT
Start: 2020-12-22 | End: 2021-01-01

## 2020-12-22 RX ORDER — FUROSEMIDE 40 MG
40 TABLET ORAL
Refills: 0 | Status: DISCONTINUED | OUTPATIENT
Start: 2020-12-22 | End: 2020-12-31

## 2020-12-22 RX ORDER — SODIUM CHLORIDE 9 MG/ML
1000 INJECTION, SOLUTION INTRAVENOUS
Refills: 0 | Status: DISCONTINUED | OUTPATIENT
Start: 2020-12-22 | End: 2021-01-01

## 2020-12-22 RX ORDER — GLUCAGON INJECTION, SOLUTION 0.5 MG/.1ML
1 INJECTION, SOLUTION SUBCUTANEOUS ONCE
Refills: 0 | Status: DISCONTINUED | OUTPATIENT
Start: 2020-12-22 | End: 2021-01-01

## 2020-12-22 RX ORDER — ATORVASTATIN CALCIUM 80 MG/1
20 TABLET, FILM COATED ORAL AT BEDTIME
Refills: 0 | Status: DISCONTINUED | OUTPATIENT
Start: 2020-12-22 | End: 2021-01-01

## 2020-12-22 RX ORDER — FUROSEMIDE 40 MG
40 TABLET ORAL ONCE
Refills: 0 | Status: COMPLETED | OUTPATIENT
Start: 2020-12-22 | End: 2020-12-22

## 2020-12-22 RX ORDER — DEXTROSE 50 % IN WATER 50 %
15 SYRINGE (ML) INTRAVENOUS ONCE
Refills: 0 | Status: DISCONTINUED | OUTPATIENT
Start: 2020-12-22 | End: 2021-01-01

## 2020-12-22 RX ORDER — MAGNESIUM SULFATE 500 MG/ML
2 VIAL (ML) INJECTION ONCE
Refills: 0 | Status: COMPLETED | OUTPATIENT
Start: 2020-12-22 | End: 2020-12-22

## 2020-12-22 RX ORDER — OXYBUTYNIN CHLORIDE 5 MG
5 TABLET ORAL DAILY
Refills: 0 | Status: DISCONTINUED | OUTPATIENT
Start: 2020-12-22 | End: 2021-01-01

## 2020-12-22 RX ORDER — DEXTROSE 50 % IN WATER 50 %
12.5 SYRINGE (ML) INTRAVENOUS ONCE
Refills: 0 | Status: DISCONTINUED | OUTPATIENT
Start: 2020-12-22 | End: 2021-01-01

## 2020-12-22 RX ORDER — INSULIN LISPRO 100/ML
VIAL (ML) SUBCUTANEOUS
Refills: 0 | Status: DISCONTINUED | OUTPATIENT
Start: 2020-12-22 | End: 2021-01-01

## 2020-12-22 RX ORDER — DEXTROSE 50 % IN WATER 50 %
25 SYRINGE (ML) INTRAVENOUS ONCE
Refills: 0 | Status: DISCONTINUED | OUTPATIENT
Start: 2020-12-22 | End: 2021-01-01

## 2020-12-22 RX ORDER — CHOLECALCIFEROL (VITAMIN D3) 125 MCG
1000 CAPSULE ORAL DAILY
Refills: 0 | Status: DISCONTINUED | OUTPATIENT
Start: 2020-12-22 | End: 2021-01-01

## 2020-12-22 RX ORDER — METOPROLOL TARTRATE 50 MG
50 TABLET ORAL DAILY
Refills: 0 | Status: DISCONTINUED | OUTPATIENT
Start: 2020-12-22 | End: 2020-12-26

## 2020-12-22 RX ADMIN — Medication 50 GRAM(S): at 23:14

## 2020-12-22 RX ADMIN — Medication 40 MILLIGRAM(S): at 18:21

## 2020-12-22 NOTE — ED PROVIDER NOTE - PROGRESS NOTE DETAILS
Vi: pt signedo ut to me pending ct and labs. with large pleural effusion. ct surg aware. chf. [place] cards consulted. satting 90-94% on room air, no resp distress. admitted to [facility] given.

## 2020-12-22 NOTE — CONSULT NOTE ADULT - SUBJECTIVE AND OBJECTIVE BOX
Subjective:   80 year old F presented to ED with worsening shortness of breath and lower extremity swelling. CT chest reveals large pleural effusion       PAST MEDICAL & SURGICAL HISTORY:      MEDICATIONS  (STANDING):  dextrose 40% Gel 15 Gram(s) Oral once  dextrose 5%. 1000 milliLiter(s) (50 mL/Hr) IV Continuous <Continuous>  dextrose 5%. 1000 milliLiter(s) (100 mL/Hr) IV Continuous <Continuous>  dextrose 50% Injectable 25 Gram(s) IV Push once  dextrose 50% Injectable 12.5 Gram(s) IV Push once  dextrose 50% Injectable 25 Gram(s) IV Push once  furosemide   Injectable 40 milliGRAM(s) IV Push two times a day  glucagon  Injectable 1 milliGRAM(s) IntraMuscular once  insulin lispro (ADMELOG) corrective regimen sliding scale   SubCutaneous three times a day before meals  magnesium sulfate  IVPB 2 Gram(s) IV Intermittent once    MEDICATIONS  (PRN):      Allergies: No Known Allergies      SOCIAL HISTORY:  Smoker:Denies  ETOH use:Denies  Ilicit Drug use:  Denies  Assist device use: None    Relevant Family History  FAMILY HISTORY:      Review of Systems  GENERAL:  Fevers[] chills[] sweats[] fatigue[] weight loss[] weight gain []                                      [ ] NEGATIVE  NEURO:  parathesias[] seizures []  syncope []  confusion []                                                                [ ] NEGATIVE                 EYES: glasses[]  blurry vision[]  discharge[] pain[] glaucoma []                                                           [ ] NEGATIVE                 ENMT:  difficulty hearing []  vertigo[]  dysphagia[] epistaxis[] recent dental work []                     [ ] NEGATIVE                 CV:  chest pain[] palpitations[] MCCLELLAND [] diaphoresis [] edema[]                                                           [ ] NEGATIVE                                 RESPIRATORY:  wheezing[] SOB[] cough [] sputum[] hemoptysis[]                                                   [ ] NEGATIVE               GI:  nausea[]  vomiting []  diarrhea[] constipation [] melena []                                                          [ ] NEGATIVE            : hematuria[ ]  dysuria[ ] urgency[] incontinence[]                                                                          [ ] NEGATIVE                    MUSKULOSKELETAL:  arthritis[ ]  joint swelling [ ] muscle weakness [ ]                                            [ ] NEGATIVE                     SKIN/BREAST:  rash[ ] itching [ ]  hair loss[ ] masses[ ]                                                                          [ ] NEGATIVE                     PSYCH:  dementia [ ] depression [ ] anxiety[ ]                                                                                          [ ] NEGATIVE                        HEME/LYMPH:  bruises easily[ ] enlarged lymph nodes[ ] tender lymph nodes[ ]                           [ ] NEGATIVE                      ENDOCRINE:  cold intolerance[ ] heat intolerance[ ] polydipsia[ ]                                                      [ ] NEGATIVE                            PHYSICAL EXAM  General: Well nourished, well developed, no acute distress.                                                         Neuro: No cognitive impairment or sensory  deficits.                      Neck: No JVD  Lungs: Diminished on right base,                                                                   CV: Regular rate and rhythm, normal S1S2  Abdomen:  Obese,  Extremities:+4 BLE edema, cool to touch from shin to toes bilaterally                                                                           Vital Signs Last 24 Hrs  T(C): 36.9 (22 Dec 2020 22:00), Max: 36.9 (22 Dec 2020 22:00)  T(F): 98.5 (22 Dec 2020 22:00), Max: 98.5 (22 Dec 2020 22:00)  HR: 88 (22 Dec 2020 22:00) (85 - 88)  BP: 106/62 (22 Dec 2020 22:00) (102/64 - 108/60)  BP(mean): --  RR: 18 (22 Dec 2020 22:00) (16 - 18)  SpO2: 96% (22 Dec 2020 22:00) (94% - 96%) on room air                                                          LABS:                        10.4   7.59  )-----------( 131      ( 22 Dec 2020 19:09 )             35.3     12-22    138  |  97<L>  |  20.0  ----------------------------<  157<H>  3.7   |  27.0  |  0.67    Ca    8.9      22 Dec 2020 20:42  Mg     1.4     12-22    TPro  7.1  /  Alb  3.8  /  TBili  2.0  /  DBili  x   /  AST  20  /  ALT  7   /  AlkPhos  92  12-22    PT/INR - ( 22 Dec 2020 20:41 )   PT: 15.9 sec;   INR: 1.39 ratio         PTT - ( 22 Dec 2020 20:41 )  PTT:30.6 sec    CARDIAC MARKERS ( 22 Dec 2020 20:42 )  x     / 0.06 ng/mL / x     / x     / x          < from: CT Chest No Cont (12.22.20 @ 20:25) >  IMPRESSION:  Large right pleural effusion with breast skin thickening. Differential diagnosis would include mastitis versus passive congestion. Evidence of hepatic cirrhosis with ascites.    < end of copied text >             HPI: 81yo F with pmh of CHF, DM, HTN, HLD presented to ED c/o worsening SOB, b/l LE swellings and orthopnea and cough for the past several days. Pt reports to sob even at rest and the swellings in her legs has been causing her difficulty with walking. She saw her PMD who told her that she has fluid in her lung, and sent her to ED. In the ED found to have elevated BNP 30581.  Thoracic surgery was consulted after CT chest reveals large right pleural effusion. Patient denies reported SOB, MCCLELLAND and cough, denies chest pain, pressure, dizziness, headache, abdominal pain, N/V/D. Patient denies recent long distance travel or sick contact.      MEDICATIONS  (STANDING):  dextrose 40% Gel 15 Gram(s) Oral once  dextrose 5%. 1000 milliLiter(s) (50 mL/Hr) IV Continuous <Continuous>  dextrose 5%. 1000 milliLiter(s) (100 mL/Hr) IV Continuous <Continuous>  dextrose 50% Injectable 25 Gram(s) IV Push once  dextrose 50% Injectable 12.5 Gram(s) IV Push once  dextrose 50% Injectable 25 Gram(s) IV Push once  furosemide   Injectable 40 milliGRAM(s) IV Push two times a day  glucagon  Injectable 1 milliGRAM(s) IntraMuscular once  insulin lispro (ADMELOG) corrective regimen sliding scale   SubCutaneous three times a day before meals  magnesium sulfate  IVPB 2 Gram(s) IV Intermittent once    MEDICATIONS  (PRN):      Allergies: No Known Allergies      SOCIAL HISTORY:  Smoker:Denies  ETOH use:Denies  Ilicit Drug use:  Denies  Assist device use: None    Relevant Family History  FAMILY HISTORY:      Review of Systems  GENERAL:  Fevers[] chills[] sweats[] fatigue[] weight loss[] weight gain []                                      [x ] NEGATIVE  NEURO:  parathesias[] seizures []  syncope []  confusion []                                                          [ x] NEGATIVE                 EYES: glasses[]  blurry vision[]  discharge[] pain[] glaucoma []                                                    [ x] NEGATIVE                 ENMT:  difficulty hearing []  vertigo[]  dysphagia[] epistaxis[] recent dental work []                       [ ] NEGATIVE                 CV:  chest pain[] palpitations[] MCCLELLAND [x] diaphoresis [] edema[x]                                                  [ ] NEGATIVE                                 RESPIRATORY:  wheezing[] SOB[x] cough [x] sputum[] hemoptysis[]                                             [ ] NEGATIVE               GI:  nausea[]  vomiting []  diarrhea[] constipation [] melena []                                                        [ x] NEGATIVE            : hematuria[ ]  dysuria[ ] urgency[] incontinence[]                                                                      [ x] NEGATIVE                    MUSKULOSKELETAL:  arthritis[ ]  joint swelling [ ] muscle weakness [ ]                                            [ x] NEGATIVE                     SKIN/BREAST:  rash[ ] itching [ ]  hair loss[ ] masses[ ]                                                                   [x ] NEGATIVE                     PSYCH:  dementia [ ] depression [ ] anxiety[ ]                                                                                [x ] NEGATIVE                        HEME/LYMPH:  bruises easily[ ] enlarged lymph nodes[ ] tender lymph nodes[ ]                             [ x] NEGATIVE                      ENDOCRINE:  cold intolerance[ ] heat intolerance[ ] polydipsia[ ]                                                      [ ] NEGATIVE                            PHYSICAL EXAM  General: Well nourished, well developed, moderate respiratory distress noted                                                      Neuro: No cognitive impairment or sensory  deficits.                      Neck: No JVD  Lungs: Diminished on right base, rales on left base, use of accessory muscle use noted with exertion.                                                                   CV: Regular rate and rhythm, normal S1S2  Abdomen:  Obese, + ascites, non- tender, non distended, + bowel sounds  Extremities:+4 BLE edema, cool to touch from shin to toes bilaterally  Skin: Left shin with venous stasis changes, mild erythema  Psych: Appropriate mood and affect. Anxious about the chest tube insertion                                                                           Vital Signs Last 24 Hrs  T(C): 36.9 (22 Dec 2020 22:00), Max: 36.9 (22 Dec 2020 22:00)  T(F): 98.5 (22 Dec 2020 22:00), Max: 98.5 (22 Dec 2020 22:00)  HR: 88 (22 Dec 2020 22:00) (85 - 88)  BP: 106/62 (22 Dec 2020 22:00) (102/64 - 108/60)  RR: 18 (22 Dec 2020 22:00) (16 - 18)  SpO2: 96% (22 Dec 2020 22:00) (94% - 96%) on room air                                                          LABS:                        10.4   7.59  )-----------( 131      ( 22 Dec 2020 19:09 )             35.3     12-22    138  |  97<L>  |  20.0  ----------------------------<  157<H>  3.7   |  27.0  |  0.67    Ca    8.9      22 Dec 2020 20:42  Mg     1.4     12-22    TPro  7.1  /  Alb  3.8  /  TBili  2.0  /  DBili  x   /  AST  20  /  ALT  7   /  AlkPhos  92  12-22    PT/INR - ( 22 Dec 2020 20:41 )   PT: 15.9 sec;   INR: 1.39 ratio         PTT - ( 22 Dec 2020 20:41 )  PTT:30.6 sec    CARDIAC MARKERS ( 22 Dec 2020 20:42 )  x     / 0.06 ng/mL / x     / x     / x          < from: CT Chest No Cont (12.22.20 @ 20:25) >  IMPRESSION:  Large right pleural effusion with breast skin thickening. Differential diagnosis would include mastitis versus passive congestion. Evidence of hepatic cirrhosis with ascites.    < end of copied text >

## 2020-12-22 NOTE — ED PROVIDER NOTE - CLINICAL SUMMARY MEDICAL DECISION MAKING FREE TEXT BOX
patient with worsening MCCLELLAND/orthopnea and LE edema, possible CHF, no CP. will get EKG/trop/bnp. CXR. covid swab. lasix. TBA for cardiac w/u

## 2020-12-22 NOTE — CONSULT NOTE ADULT - ASSESSMENT
80 year old Female with right pleural effusion 79yo F with pmh of CHF, DM, HTN, HLD presented to ED c/o worsening SOB, b/l LE swellings and orthopnea and cough for the past several days. In the ED found to have elevated BNP 19690.  Thoracic surgery was consulted after CT chest reveals large right pleural effusion 79yo F with pmh of CHF, DM, HTN, HLD presented to ED c/o worsening SOB, b/l LE swellings and orthopnea and cough for the past several days. In the ED found to have elevated BNP 19690.  Thoracic surgery was consulted after CT chest reveals large right pleural effusion.

## 2020-12-22 NOTE — ED PROVIDER NOTE - OBJECTIVE STATEMENT
79yo F with worsening SOB on exertion for last few weeks, much worse today- SOB even at rest. +orthopnea. no CP. +wet cough. no fever/chills. worsening leg swelling making walking difficult. no falls. hasn't seen cardiologist in years. saw PCP who said 'there is fluid in my lungs' and sent to ED. no abd pain. +abd distension.    PMH- HTN, DM   PSH= hip fx

## 2020-12-22 NOTE — H&P ADULT - ASSESSMENT
81yo F with pmh of chf, DM, HTN, HLD presented to ED c/o worsening SOB, b/l LE swellings and orthopnea and cough for the past several days. Pt reports to sob even at rest and the swellings in her legs has been causing her difficulty with walking. She saw her PMD who told her that she has fluid in her lung, and sent her to ED. Denies cp, fever, chills, n/v. In the ED found to have elevated BNP 19690, CT chest with large right pleural effusion.     Acute decompensated HF   -acute fluid overload on exam  -Elevated BNP 19690, diffused b/l LE edema   -CT chest with large right Pleural effusion  -s/p 40mg IV lasix in the Ed   -cont with 40mg IV lasix BID   -strict I/O, daily weight, DASH diet   -check TTE   -PATITO cardiology consulted     Right large pleural effusion   -tolerated RA but with excessive wet cough   -CT surgery planned for right pigtail tonight   -supplement oxygen as needed   -cont with IV Lasix     Diabetes   -hold home po meds  -ISS, hypoglycemic protocol, FSG  -check A1c     HTN/HLD  -cont with Lisinopril and metoprolol with holding parameter   -cont with atorvastatin 20mg daily     DVT ppx   -hold AC for now planned pigtail     81yo F with pmh of chf, DM, HTN, HLD presented to ED c/o worsening SOB, b/l LE swellings and orthopnea and cough for the past several days. Pt reports to sob even at rest and the swellings in her legs has been causing her difficulty with walking. She saw her PMD who told her that she has fluid in her lung, and sent her to ED. Denies cp, fever, chills, n/v. In the ED found to have elevated BNP 19690, CT chest with large right pleural effusion.     Acute decompensated HF   -acute fluid overload on exam  -Elevated BNP 19690, diffused b/l LE edema   -CT chest with large right Pleural effusion  -s/p 40mg IV lasix in the Ed   -cont with 40mg IV lasix BID   -strict I/O, daily weight, DASH diet   -check TTE, check US arterial LE   -PATITO cardiology consulted     Right large pleural effusion   -tolerated RA but with excessive wet cough   -CT surgery planned for right pigtail tonight   -supplement oxygen as needed   -cont with IV Lasix     Diabetes   -hold home po meds  -ISS, hypoglycemic protocol, FSG  -check A1c     HTN/HLD  -cont with Lisinopril and metoprolol with holding parameter   -cont with atorvastatin 20mg daily     DVT ppx   -hold AC for now planned pigtail

## 2020-12-22 NOTE — H&P ADULT - HISTORY OF PRESENT ILLNESS
79yo F with pmh of chf, DM, HTN, HLD presented to ED c/o worsening SOB, b/l LE swellings and orthopnea and cough for the past several days. Pt reports to sob even at rest and the swellings in her legs has been causing her difficulty with walking. She saw her PMD who told her that she has fluid in her lung, and sent her to ED. Denies cp, fever, chills, n/v. In the ED found to have elevated BNP 19690, CT chest with large right pleural effusion.

## 2020-12-22 NOTE — CONSULT NOTE ADULT - PROBLEM SELECTOR RECOMMENDATION 9
BNP 19690  IV Lasix given BNP 19690, diffused b/l LE edema   IV Lasix 40 mg given in ED,  40mg IV Lasix BID ordered  On Lisinopril and Toprol XL  strict I/O, daily weight, DASH diet   Repeat BMP, replete electrolytes as needed  Admitted to medicine  Cardiology consult

## 2020-12-22 NOTE — H&P ADULT - NSICDXPASTMEDICALHX_GEN_ALL_CORE_FT
PAST MEDICAL HISTORY:  Acute CHF (congestive heart failure)     DM (diabetes mellitus)     Hypertension     Vertigo

## 2020-12-22 NOTE — ED PROVIDER NOTE - NS ED ROS FT
ROS: no CP +SOB. +cough. no fever. no n/v/d/c. no abd pain. no rash. no bleeding. no urinary complaints. no weakness. no vision changes. no HA. no neck/back pain. +extremity swelling/deformity. No change in mental status.

## 2020-12-22 NOTE — ED PROVIDER NOTE - PHYSICAL EXAMINATION
Gen: NAD, AOx3  Head: NCAT  HEENT: EOMI, oral mucosa moist, normal conjunctiva, neck supple  Lung: decreased BS b/l, faint crackles, mild tachypnea, speaking in full sentences   CV: rrr, no murmur, Normal perfusion  Abd: soft, NTND  MSK: +2 b/l LE pitting edema- weeping, no visible deformities  Neuro: No focal neurologic deficits  Skin: No rash   Psych: normal affect

## 2020-12-22 NOTE — ED ADULT NURSE NOTE - OBJECTIVE STATEMENT
81 y/o m a&ox3 comes to ED sent from primary MD for CHF. bilateral pitting edema noted to lower extremities. pt. c/o sob. pt. denies chest pain. pt. in no apparent distress at this time, breathing even and unlabored.

## 2020-12-22 NOTE — CONSULT NOTE ADULT - PROBLEM SELECTOR RECOMMENDATION 2
Will insert right pigtail   Discussed with Dr. Escoto CT chest with large right Pleural effusion, moderate respiratory distress on examination  Right chest tube inserted, pleural fluid sent for cytology, chemistry and culture  Post pigtail CXR with no pneumothorax  Will maintain pigtail to waterseal  Repeat CXR in AM  Discussed with Dr. Escoto

## 2020-12-22 NOTE — H&P ADULT - NSHPPHYSICALEXAM_GEN_ALL_CORE
T(C): 36.9 (12-22-20 @ 22:00), Max: 36.9 (12-22-20 @ 22:00)  HR: 88 (12-22-20 @ 22:00) (85 - 88)  BP: 106/62 (12-22-20 @ 22:00) (102/64 - 108/60)  RR: 18 (12-22-20 @ 22:00) (16 - 18)  SpO2: 96% (12-22-20 @ 22:00) (94% - 96%)    GENERAL: patient appears well, no acute distress, appropriate, pleasant  EYES: sclera clear, no exudates  ENMT: oropharynx clear without erythema, no exudates, moist mucous membranes  NECK: supple, soft, no thyromegaly noted  LUNGS: decreased breath sound right lung,+ rhonchi appreciated  HEART: soft S1/S2, regular rate and rhythm, no murmurs noted, diffuse b/l lower extremity edema   GASTROINTESTINAL: abdomen is soft, nontender, nondistended, normoactive bowel sounds, no palpable masses  INTEGUMENT: good skin turgor, warm skin, appears well perfused  MUSCULOSKELETAL: no clubbing or cyanosis, no obvious deformity  NEUROLOGIC: awake, alert, oriented x3, good muscle tone in 4 extremities, no obvious sensory deficits  PSYCHIATRIC: mood is good, affect is congruent, linear and logical thought process  HEME/LYMPH: no palpable supraclavicular nodules, no obvious ecchymosis or petechiae T(C): 36.9 (12-22-20 @ 22:00), Max: 36.9 (12-22-20 @ 22:00)  HR: 88 (12-22-20 @ 22:00) (85 - 88)  BP: 106/62 (12-22-20 @ 22:00) (102/64 - 108/60)  RR: 18 (12-22-20 @ 22:00) (16 - 18)  SpO2: 96% (12-22-20 @ 22:00) (94% - 96%)    GENERAL: patient appears well, no acute distress, appropriate, pleasant  EYES: sclera clear, no exudates  ENMT: oropharynx clear without erythema, no exudates, moist mucous membranes  NECK: supple, soft, no thyromegaly noted  LUNGS: decreased breath sound right lung,+ rhonchi appreciated  HEART: soft S1/S2, regular rate and rhythm, no murmurs noted, diffuse b/l lower extremity edema   GASTROINTESTINAL: abdomen is soft, nontender, nondistended, normoactive bowel sounds, no palpable masses  INTEGUMENT: good skin turgor, warm skin, appears well perfused  MUSCULOSKELETAL: cold b/l LE below the ankle with diminished pulse   NEUROLOGIC: awake, alert, oriented x3, good muscle tone in 4 extremities, no obvious sensory deficits  PSYCHIATRIC: mood is good, affect is congruent, linear and logical thought process  HEME/LYMPH: no palpable supraclavicular nodules, no obvious ecchymosis or petechiae

## 2020-12-22 NOTE — ED ADULT NURSE REASSESSMENT NOTE - NS ED NURSE REASSESS COMMENT FT1
Report received from day RN Pt A&Ox4 c/o shortness of breath with dread lower extremity +2 swelling noted at this time. patient breathing unlabored, lung sounds dim through out. Pt resting comfortably, VSS, no signs of distress at this time, CM in place, awaiting test results for dispo, safety maintained, call bell in reach.

## 2020-12-23 DIAGNOSIS — I10 ESSENTIAL (PRIMARY) HYPERTENSION: ICD-10-CM

## 2020-12-23 DIAGNOSIS — E11.9 TYPE 2 DIABETES MELLITUS WITHOUT COMPLICATIONS: ICD-10-CM

## 2020-12-23 LAB
A1C WITH ESTIMATED AVERAGE GLUCOSE RESULT: 6.7 % — HIGH (ref 4–5.6)
ALBUMIN FLD-MCNC: 1.5 G/DL — SIGNIFICANT CHANGE UP
ANION GAP SERPL CALC-SCNC: 12 MMOL/L — SIGNIFICANT CHANGE UP (ref 5–17)
B PERT IGG+IGM PNL SER: ABNORMAL
BUN SERPL-MCNC: 19 MG/DL — SIGNIFICANT CHANGE UP (ref 8–20)
CALCIUM SERPL-MCNC: 8.7 MG/DL — SIGNIFICANT CHANGE UP (ref 8.6–10.2)
CHLORIDE SERPL-SCNC: 97 MMOL/L — LOW (ref 98–107)
CO2 SERPL-SCNC: 29 MMOL/L — SIGNIFICANT CHANGE UP (ref 22–29)
COLOR FLD: YELLOW
CREAT SERPL-MCNC: 0.79 MG/DL — SIGNIFICANT CHANGE UP (ref 0.5–1.3)
ESTIMATED AVERAGE GLUCOSE: 146 MG/DL — HIGH (ref 68–114)
FLUID INTAKE SUBSTANCE CLASS: SIGNIFICANT CHANGE UP
FLUID SEGMENTED GRANULOCYTES: 20 % — SIGNIFICANT CHANGE UP
FOLATE+VIT B12 SERBLD-IMP: 6 % — SIGNIFICANT CHANGE UP
GLUCOSE BLDC GLUCOMTR-MCNC: 178 MG/DL — HIGH (ref 70–99)
GLUCOSE BLDC GLUCOMTR-MCNC: 205 MG/DL — HIGH (ref 70–99)
GLUCOSE BLDC GLUCOMTR-MCNC: 217 MG/DL — HIGH (ref 70–99)
GLUCOSE FLD-MCNC: 165 MG/DL — SIGNIFICANT CHANGE UP
GLUCOSE SERPL-MCNC: 217 MG/DL — HIGH (ref 70–99)
GRAM STN FLD: SIGNIFICANT CHANGE UP
HCT VFR BLD CALC: 32.9 % — LOW (ref 34.5–45)
HGB BLD-MCNC: 9.7 G/DL — LOW (ref 11.5–15.5)
LYMPHOCYTES # FLD: 66 % — SIGNIFICANT CHANGE UP
MCHC RBC-ENTMCNC: 26.3 PG — LOW (ref 27–34)
MCHC RBC-ENTMCNC: 29.5 GM/DL — LOW (ref 32–36)
MCV RBC AUTO: 89.2 FL — SIGNIFICANT CHANGE UP (ref 80–100)
MESOTHL CELL # FLD: 2 % — SIGNIFICANT CHANGE UP
MONOS+MACROS # FLD: 4 % — SIGNIFICANT CHANGE UP
NRBC # FLD: 2 % — SIGNIFICANT CHANGE UP (ref 0–0)
PH FLD: 8 — SIGNIFICANT CHANGE UP
PLATELET # BLD AUTO: 128 K/UL — LOW (ref 150–400)
POTASSIUM SERPL-MCNC: 3.4 MMOL/L — LOW (ref 3.5–5.3)
POTASSIUM SERPL-SCNC: 3.4 MMOL/L — LOW (ref 3.5–5.3)
PROT FLD-MCNC: 2.3 G/DL — SIGNIFICANT CHANGE UP
RBC # BLD: 3.69 M/UL — LOW (ref 3.8–5.2)
RBC # FLD: 18.7 % — HIGH (ref 10.3–14.5)
RCV VOL RI: 2000 /UL — HIGH (ref 0–0)
SODIUM SERPL-SCNC: 138 MMOL/L — SIGNIFICANT CHANGE UP (ref 135–145)
SPECIMEN SOURCE: SIGNIFICANT CHANGE UP
TOTAL NUCLEATED CELL COUNT, BODY FLUID: 73 /UL — SIGNIFICANT CHANGE UP
TUBE TYPE: SIGNIFICANT CHANGE UP
WBC # BLD: 6.72 K/UL — SIGNIFICANT CHANGE UP (ref 3.8–10.5)
WBC # FLD AUTO: 6.72 K/UL — SIGNIFICANT CHANGE UP (ref 3.8–10.5)

## 2020-12-23 PROCEDURE — 99233 SBSQ HOSP IP/OBS HIGH 50: CPT

## 2020-12-23 PROCEDURE — 71045 X-RAY EXAM CHEST 1 VIEW: CPT | Mod: 26,76

## 2020-12-23 PROCEDURE — 93306 TTE W/DOPPLER COMPLETE: CPT | Mod: 26

## 2020-12-23 PROCEDURE — 32551 INSERTION OF CHEST TUBE: CPT

## 2020-12-23 PROCEDURE — 99223 1ST HOSP IP/OBS HIGH 75: CPT

## 2020-12-23 PROCEDURE — 93925 LOWER EXTREMITY STUDY: CPT | Mod: 26

## 2020-12-23 PROCEDURE — 99231 SBSQ HOSP IP/OBS SF/LOW 25: CPT | Mod: 25

## 2020-12-23 PROCEDURE — 99497 ADVNCD CARE PLAN 30 MIN: CPT

## 2020-12-23 RX ORDER — LIDOCAINE HCL 20 MG/ML
1 VIAL (ML) INJECTION ONCE
Refills: 0 | Status: DISCONTINUED | OUTPATIENT
Start: 2020-12-23 | End: 2021-01-01

## 2020-12-23 RX ORDER — HYDROMORPHONE HYDROCHLORIDE 2 MG/ML
0.25 INJECTION INTRAMUSCULAR; INTRAVENOUS; SUBCUTANEOUS ONCE
Refills: 0 | Status: DISCONTINUED | OUTPATIENT
Start: 2020-12-23 | End: 2020-12-23

## 2020-12-23 RX ORDER — ASPIRIN/CALCIUM CARB/MAGNESIUM 324 MG
81 TABLET ORAL DAILY
Refills: 0 | Status: DISCONTINUED | OUTPATIENT
Start: 2020-12-23 | End: 2021-01-01

## 2020-12-23 RX ORDER — HEPARIN SODIUM 5000 [USP'U]/ML
5000 INJECTION INTRAVENOUS; SUBCUTANEOUS EVERY 12 HOURS
Refills: 0 | Status: DISCONTINUED | OUTPATIENT
Start: 2020-12-23 | End: 2021-01-01

## 2020-12-23 RX ADMIN — Medication 1000 UNIT(S): at 13:18

## 2020-12-23 RX ADMIN — LISINOPRIL 2.5 MILLIGRAM(S): 2.5 TABLET ORAL at 06:02

## 2020-12-23 RX ADMIN — HYDROMORPHONE HYDROCHLORIDE 0.25 MILLIGRAM(S): 2 INJECTION INTRAMUSCULAR; INTRAVENOUS; SUBCUTANEOUS at 03:44

## 2020-12-23 RX ADMIN — ATORVASTATIN CALCIUM 20 MILLIGRAM(S): 80 TABLET, FILM COATED ORAL at 21:35

## 2020-12-23 RX ADMIN — Medication 50 MILLIGRAM(S): at 06:03

## 2020-12-23 RX ADMIN — Medication 40 MILLIGRAM(S): at 19:21

## 2020-12-23 RX ADMIN — Medication 1 TABLET(S): at 13:18

## 2020-12-23 RX ADMIN — Medication 40 MILLIGRAM(S): at 06:02

## 2020-12-23 RX ADMIN — PANTOPRAZOLE SODIUM 40 MILLIGRAM(S): 20 TABLET, DELAYED RELEASE ORAL at 06:03

## 2020-12-23 RX ADMIN — HEPARIN SODIUM 5000 UNIT(S): 5000 INJECTION INTRAVENOUS; SUBCUTANEOUS at 19:21

## 2020-12-23 RX ADMIN — HYDROMORPHONE HYDROCHLORIDE 0.25 MILLIGRAM(S): 2 INJECTION INTRAMUSCULAR; INTRAVENOUS; SUBCUTANEOUS at 02:36

## 2020-12-23 RX ADMIN — Medication 12.5 MILLIGRAM(S): at 06:02

## 2020-12-23 RX ADMIN — Medication 5 MILLIGRAM(S): at 13:18

## 2020-12-23 RX ADMIN — Medication 2: at 19:19

## 2020-12-23 RX ADMIN — Medication 12.5 MILLIGRAM(S): at 13:23

## 2020-12-23 RX ADMIN — Medication 81 MILLIGRAM(S): at 13:18

## 2020-12-23 RX ADMIN — Medication 12.5 MILLIGRAM(S): at 21:35

## 2020-12-23 NOTE — PROGRESS NOTE ADULT - ASSESSMENT
81yo F with pmh of chf, DM, HTN, HLD presented to ED c/o worsening SOB, b/l LE swellings and orthopnea and cough for the past several days. Pt reports to sob even at rest and the swellings in her legs has been causing her difficulty with walking. She saw her PMD who told her that she has fluid in her lung, and sent her to ED. Found to have RT pleural effusion, s/p pigtail catheter insertion.

## 2020-12-23 NOTE — CONSULT NOTE ADULT - SUBJECTIVE AND OBJECTIVE BOX
CHIEF COMPLAINT: shortness of breath, leg swelling    HPI: Patient is a  80y Female with HTN, DM, aortic stenosis here with increasing dyspnea and leg swelling. PAtient lives alone, IADLs but gets help with shopping from cousin and friend. Walks with walker. For past year has noted increasing MCCLELLAND, barely able to walk across room prior to getting winded. Worse recently. No PNd/orthopnea/palps/CP/Sycnope. Has noted increasing leg swelling past few months as well. No change during time of day. Legs feel heavy and makes difficult to walk.    In ED work up done and found to have large right pleural effusion. Chest tube placed. Also started on diuretics.     PAST MEDICAL & SURGICAL HISTORY:  Vertigo    Hypertension    DM (diabetes mellitus)    Acute CHF (congestive heart failure)    S/P cholecystectomy    S/P left knee surgery        MEDICATIONS:  MEDICATIONS  (STANDING):  atorvastatin 20 milliGRAM(s) Oral at bedtime  cholecalciferol 1000 Unit(s) Oral daily  dextrose 40% Gel 15 Gram(s) Oral once  dextrose 5%. 1000 milliLiter(s) (50 mL/Hr) IV Continuous <Continuous>  dextrose 5%. 1000 milliLiter(s) (100 mL/Hr) IV Continuous <Continuous>  dextrose 50% Injectable 25 Gram(s) IV Push once  dextrose 50% Injectable 12.5 Gram(s) IV Push once  dextrose 50% Injectable 25 Gram(s) IV Push once  furosemide   Injectable 40 milliGRAM(s) IV Push two times a day  glucagon  Injectable 1 milliGRAM(s) IntraMuscular once  insulin lispro (ADMELOG) corrective regimen sliding scale   SubCutaneous three times a day before meals  lidocaine 1% (Preservative-free) Injectable 1 milliLiter(s) Local Injection once  lisinopril 2.5 milliGRAM(s) Oral daily  meclizine 12.5 milliGRAM(s) Oral three times a day  metoprolol succinate ER 50 milliGRAM(s) Oral daily  multivitamin 1 Tablet(s) Oral daily  oxybutynin 5 milliGRAM(s) Oral daily  pantoprazole    Tablet 40 milliGRAM(s) Oral before breakfast    MEDICATIONS  (PRN):    atorvastatin 20 milliGRAM(s) Oral at bedtime  cholecalciferol 1000 Unit(s) Oral daily  dextrose 40% Gel 15 Gram(s) Oral once  dextrose 5%. 1000 milliLiter(s) IV Continuous <Continuous>  dextrose 5%. 1000 milliLiter(s) IV Continuous <Continuous>  dextrose 50% Injectable 25 Gram(s) IV Push once  dextrose 50% Injectable 12.5 Gram(s) IV Push once  dextrose 50% Injectable 25 Gram(s) IV Push once  furosemide   Injectable 40 milliGRAM(s) IV Push two times a day  glucagon  Injectable 1 milliGRAM(s) IntraMuscular once  insulin lispro (ADMELOG) corrective regimen sliding scale   SubCutaneous three times a day before meals  lidocaine 1% (Preservative-free) Injectable 1 milliLiter(s) Local Injection once  lisinopril 2.5 milliGRAM(s) Oral daily  meclizine 12.5 milliGRAM(s) Oral three times a day  metoprolol succinate ER 50 milliGRAM(s) Oral daily  multivitamin 1 Tablet(s) Oral daily  oxybutynin 5 milliGRAM(s) Oral daily  pantoprazole    Tablet 40 milliGRAM(s) Oral before breakfast      FAMILY HISTORY:  FAMILY HISTORY:  FH: hypertension        SOCIAL HISTORY: no EtOH, drugs or tobacco    ROS: GI negative All others negative    PHYSCIAL EXAM:  Vital Signs Last 24 Hrs  T(C): 36.4 (23 Dec 2020 07:44), Max: 36.9 (22 Dec 2020 22:00)  T(F): 97.5 (23 Dec 2020 07:44), Max: 98.5 (22 Dec 2020 22:00)  HR: 67 (23 Dec 2020 07:44) (67 - 88)  BP: 110/72 (23 Dec 2020 07:44) (95/63 - 112/62)  BP(mean): --  RR: 18 (23 Dec 2020 07:44) (16 - 18)  SpO2: 100% (23 Dec 2020 07:44) (94% - 100%)  I&O's Summary    GEN: elderly F in NAD  HEENT: MMM, sclera anicteric  RESP: absent right base, bilateral crackles  CVS: S1, absent S2, RRR, III/VI harsh mid systolic mumur, mild JVD  GI: Soft, NT, ND, BS+  EXT: 2-3+ pitting edema bilaterally   NEURO: AAOX3  PSYCH: Normal affect    ECG: ST, low voltage, leftward axis    LABS:                        10.4   7.59  )-----------( 131      ( 22 Dec 2020 19:09 )             35.3     12-22    138  |  97<L>  |  20.0  ----------------------------<  157<H>  3.7   |  27.0  |  0.67    Ca    8.9      22 Dec 2020 20:42  Mg     1.4     12-22    TPro  7.1  /  Alb  3.8  /  TBili  2.0  /  DBili  x   /  AST  20  /  ALT  7   /  AlkPhos  92  12-22    CARDIAC MARKERS ( 22 Dec 2020 20:42 )  x     / 0.06 ng/mL / x     / x     / x          PT/INR - ( 22 Dec 2020 20:41 )   PT: 15.9 sec;   INR: 1.39 ratio         PTT - ( 22 Dec 2020 20:41 )  PTT:30.6 sec      RADIOLOGY & ADDITIONAL STUDIES:  CHEST CT 12/22/2020  IMPRESSION:  1. Large right pleural effusion with breast skin thickening. Differential diagnosis would include mastitis versus passive congestion.   2. Evidence of hepatic cirrhosis with ascites.       Assessment:     80y Female with HTN, DM, aortic stenosis here with increasing dyspnea and leg swelling due to acute congestive heart failure (EF unknown). Also with large pleural effusion on the right s/p chest tube. Suspect all related to CHF but fluid results still pending. Echo pending as well. Concern for severe AS leading to CHF, also may have significant CAD as reviewed CT and has extensive coronary artery calcifications. Explained to patient. Agreeable to further work up and invasive evaluation. Will need cath    Plan:  1. Continue IV lasix 40mg bid and monitore renal function and lytes  2. Continue low dose ACEI and beta blocker. Can cut back if needed for BP  3. ASA/statin  4. Daily weights, strict I/Os  5. 1.5 L fluid restriction  6. Will follow up echo  7. Will likely need cath, possibly tomorrow  8. Will follow, thank you       Cheng Manley MD

## 2020-12-23 NOTE — PROGRESS NOTE ADULT - ASSESSMENT
81yo F with pmh of chf, DM, HTN, HLD presented to ED c/o worsening SOB, b/l LE swellings and orthopnea and cough for the past several days. Pt reports to sob even at rest and the swellings in her legs has been causing her difficulty with walking. She saw her PMD who told her that she has fluid in her lung, and sent her to ED. Denies cp, fever, chills, n/v. In the ED found to have elevated BNP 19690, CT chest with large right pleural effusion.     Acute decompensated HF with large rt pleural effusion s/p CT placement  -Elevated BNP 19690, diffused b/l LE edema   -CT chest with large right Pleural effusion  -IV lasix BID, BB acei  -strict I/O, daily weight, DASH diet   -pending  TTE,  US arterial LE   -[facility] cardiology consulted     Right large pleural effusion  s/p chest tube   -f/u fluid analysis, ct surgery rec  -supplement oxygen as needed   -cont with IV Lasix     Diabetes   -hold home po meds  -ISS, hypoglycemic protocol, FSG  -check A1c     HTN/HLD  -cont with Lisinopril and metoprolol with holding parameter   -cont with atorvastatin 20mg daily   GI ppx ppi  DVT ppx   -heparin    care of plan dw pt. Pt is AAOX3. [facility] PT- PT WANTS TO BE FULL CODE INCLUDE INTUBATION, RESUSCITATION

## 2020-12-23 NOTE — PROCEDURE NOTE - NSPROCDETAILS_GEN_ALL_CORE
Seldinger technique/dressing applied/secured in place/sterile dressing applied/ultrasound assessment of fluid (location)

## 2020-12-23 NOTE — PROGRESS NOTE ADULT - SUBJECTIVE AND OBJECTIVE BOX
Patient is a 80y old  Female who presents with a chief complaint of Acute decompensated HF  right pleural effusion (23 Dec 2020 10:38)    Pt seen and exam. C/O rt chest pain at tube side.  Sob stable with 2-3 L oxy  No fever,chill, weakness, numbness    SUBJECTIVE / OVERNIGHT EVENTS:  REVIEW OF SYSTEMS: All systems are reviewed and found to be negative except above    MEDICATIONS  (STANDING):  aspirin enteric coated 81 milliGRAM(s) Oral daily  atorvastatin 20 milliGRAM(s) Oral at bedtime  cholecalciferol 1000 Unit(s) Oral daily  dextrose 40% Gel 15 Gram(s) Oral once  dextrose 5%. 1000 milliLiter(s) (50 mL/Hr) IV Continuous <Continuous>  dextrose 5%. 1000 milliLiter(s) (100 mL/Hr) IV Continuous <Continuous>  dextrose 50% Injectable 25 Gram(s) IV Push once  dextrose 50% Injectable 12.5 Gram(s) IV Push once  dextrose 50% Injectable 25 Gram(s) IV Push once  furosemide   Injectable 40 milliGRAM(s) IV Push two times a day  glucagon  Injectable 1 milliGRAM(s) IntraMuscular once  insulin lispro (ADMELOG) corrective regimen sliding scale   SubCutaneous three times a day before meals  lidocaine 1% (Preservative-free) Injectable 1 milliLiter(s) Local Injection once  lisinopril 2.5 milliGRAM(s) Oral daily  meclizine 12.5 milliGRAM(s) Oral three times a day  metoprolol succinate ER 50 milliGRAM(s) Oral daily  multivitamin 1 Tablet(s) Oral daily  oxybutynin 5 milliGRAM(s) Oral daily  pantoprazole    Tablet 40 milliGRAM(s) Oral before breakfast    MEDICATIONS  (PRN):      CAPILLARY BLOOD GLUCOSE        I&O's Summary      PHYSICAL EXAM:  Vital Signs Last 24 Hrs  T(C): 36.4 (23 Dec 2020 07:44), Max: 36.9 (22 Dec 2020 22:00)  T(F): 97.5 (23 Dec 2020 07:44), Max: 98.5 (22 Dec 2020 22:00)  HR: 67 (23 Dec 2020 07:44) (67 - 88)  BP: 110/72 (23 Dec 2020 07:44) (95/63 - 112/62)  BP(mean): --  RR: 18 (23 Dec 2020 07:44) (16 - 18)  SpO2: 100% (23 Dec 2020 07:44) (94% - 100%)    CONSTITUTIONAL: NAD  EYES: PERRLA; conjunctiva and sclera clear  ENMT: Moist oral mucosa,   RESPIRATORY: Normal respiratory effort; tender rt chest side, mild crackle  to auscultation bilaterally bases. + air entry lt>rt  CARDIOVASCULAR: Regular rate and rhythm, normal S1 and S2, no murmur   EXTS: +lower extremity edema; Peripheral pulses are 2+ bilaterally  ABDOMEN: Nontender to palpation, normoactive bowel sounds, no rebound/guarding;   MUSCLOSKELETAL:   no clubbing or cyanosis of digits; no joint swelling or tenderness to palpation  PSYCH: affect appropriate  NEUROLOGY: A+O to person, place, and time; CN 2-12 are intact and symmetric; no gross sensory deficits;       LABS:                        10.4   7.59  )-----------( 131      ( 22 Dec 2020 19:09 )             35.3     12-22    138  |  97<L>  |  20.0  ----------------------------<  157<H>  3.7   |  27.0  |  0.67    Ca    8.9      22 Dec 2020 20:42  Mg     1.4     12-22    TPro  7.1  /  Alb  3.8  /  TBili  2.0  /  DBili  x   /  AST  20  /  ALT  7   /  AlkPhos  92  12-22    PT/INR - ( 22 Dec 2020 20:41 )   PT: 15.9 sec;   INR: 1.39 ratio         PTT - ( 22 Dec 2020 20:41 )  PTT:30.6 sec  CARDIAC MARKERS ( 22 Dec 2020 20:42 )  x     / 0.06 ng/mL / x     / x     / x              Culture - Fungal, Body Fluid (collected 23 Dec 2020 05:22)  Source: .Body Fluid Pleural Fluid  Preliminary Report (23 Dec 2020 09:00):    Testing in progress    Culture - Body Fluid with Gram Stain (collected 23 Dec 2020 05:22)  Source: .Body Fluid Pleural Fluid  Gram Stain (23 Dec 2020 07:19):    polymorphonuclear leukocytes seen    No organisms seen    by cytocentrifuge        RADIOLOGY & ADDITIONAL TESTS:  Results Reviewed:   Imaging Personally Reviewed:  Electrocardiogram Personally Reviewed:    COORDINATION OF CARE:  Care Discussed with Consultants/Other Providers [Y/N]:  Prior or Outpatient Records Reviewed [Y/N]:

## 2020-12-23 NOTE — PROGRESS NOTE ADULT - HEIGHT IN FEET
5 I will SWITCH the dose or number of times a day I take the medications listed below when I get home from the hospital:  None

## 2020-12-23 NOTE — PROGRESS NOTE ADULT - SUBJECTIVE AND OBJECTIVE BOX
Subjective - patient seen and evaluated bedside. Sitting comfortably in bed. Denies CP, SOB, HA, dizziness, n/v/d. States "my breathing is better"         PAST MEDICAL & SURGICAL HISTORY:  Vertigo    Hypertension    DM (diabetes mellitus)    Acute CHF (congestive heart failure)    S/P cholecystectomy    S/P left knee surgery          aspirin enteric coated 81 milliGRAM(s) Oral daily  atorvastatin 20 milliGRAM(s) Oral at bedtime  cholecalciferol 1000 Unit(s) Oral daily  dextrose 40% Gel 15 Gram(s) Oral once  dextrose 5%. 1000 milliLiter(s) IV Continuous <Continuous>  dextrose 5%. 1000 milliLiter(s) IV Continuous <Continuous>  dextrose 50% Injectable 25 Gram(s) IV Push once  dextrose 50% Injectable 12.5 Gram(s) IV Push once  dextrose 50% Injectable 25 Gram(s) IV Push once  furosemide   Injectable 40 milliGRAM(s) IV Push two times a day  glucagon  Injectable 1 milliGRAM(s) IntraMuscular once  heparin   Injectable 5000 Unit(s) SubCutaneous every 12 hours  insulin lispro (ADMELOG) corrective regimen sliding scale   SubCutaneous three times a day before meals  lidocaine 1% (Preservative-free) Injectable 1 milliLiter(s) Local Injection once  lisinopril 2.5 milliGRAM(s) Oral daily  meclizine 12.5 milliGRAM(s) Oral three times a day  metoprolol succinate ER 50 milliGRAM(s) Oral daily  multivitamin 1 Tablet(s) Oral daily  oxybutynin 5 milliGRAM(s) Oral daily  pantoprazole    Tablet 40 milliGRAM(s) Oral before breakfast  MEDICATIONS  (PRN):      Daily     Daily                               9.7    6.72  )-----------( 128      ( 23 Dec 2020 14:03 )             32.9   12-23    138  |  97<L>  |  19.0  ----------------------------<  217<H>  3.4<L>   |  29.0  |  0.79    Ca    8.7      23 Dec 2020 14:03  Mg     1.4     12-22    TPro  7.1  /  Alb  3.8  /  TBili  2.0  /  DBili  x   /  AST  20  /  ALT  7   /  AlkPhos  92  12-22    CARDIAC MARKERS ( 22 Dec 2020 20:42 )  x     / 0.06 ng/mL / x     / x     / x        PT/INR - ( 22 Dec 2020 20:41 )   PT: 15.9 sec;   INR: 1.39 ratio         PTT - ( 22 Dec 2020 20:41 )  PTT:30.6 sec      Objective:  T(C): 37.1 (12-23-20 @ 15:46), Max: 37.1 (12-23-20 @ 15:46)  HR: 90 (12-23-20 @ 15:46) (67 - 90)  BP: 105/72 (12-23-20 @ 15:46) (95/63 - 112/62)  RR: 18 (12-23-20 @ 15:46) (17 - 18)  SpO2: 98% (12-23-20 @ 15:46) (95% - 100%)  Wt(kg): --CAPILLARY BLOOD GLUCOSE      POCT Blood Glucose.: 205 mg/dL (23 Dec 2020 16:23)  POCT Blood Glucose.: 217 mg/dL (23 Dec 2020 13:10)  I&O's Summary    23 Dec 2020 07:01  -  23 Dec 2020 19:09  --------------------------------------------------------  IN: 0 mL / OUT: 930 mL / NET: -930 mL        Physical Exam  Neuro: A+O x 3, non-focal, speech clear and intact  Pulm: diminished at bases bilaterally  CV: RRR, , +S1S2, no m/r/g  Abd: soft, NT, ND, +BS  Ext: +DP Pulses b/l, no edema  Chest tubes: RT pleural pigtail catheter to WS. no signs of air leak. tube at skin w/dressing c/d/i    Imaging:  CXR: < from: Xray Chest 1 View-PORTABLE IMMEDIATE (Xray Chest 1 View-PORTABLE IMMEDIATE .) (12.23.20 @ 04:16) >    INTERPRETATION:  AP chest on December 23, 2020 at 1:59 AM. Prior imaging showed bilateral basilar lung findings right greater than left. Patient had chest tube insertion.    Heart enlargement noted. There is calcification in the mitral annulus.    Catheter right chest tube is been inserted in the density at right base is markedly improved with minimal residual atelectasis. No pneumothorax is seen.    Left base findings also show improvement.    Follow-up AP chest on December 23, 2020 at 3:57 AM. Chest is stable.    IMPRESSION: Diminished right effusion after right chest tube. There are slight residual atelectatic findings a right base. Left lung also shows improvement.    < end of copied text >

## 2020-12-23 NOTE — GOALS OF CARE CONVERSATION - ADVANCED CARE PLANNING - CONVERSATION DETAILS
CHF with extensive rt pleural effusion s/p chest tube ,requiring oxygen for shortness of breath  Pt wants to intubated and resuscitation if require.  Wants everythings to be done

## 2020-12-24 PROBLEM — I10 ESSENTIAL (PRIMARY) HYPERTENSION: Chronic | Status: ACTIVE | Noted: 2020-12-22

## 2020-12-24 PROBLEM — R42 DIZZINESS AND GIDDINESS: Chronic | Status: ACTIVE | Noted: 2020-12-22

## 2020-12-24 PROBLEM — I50.9 HEART FAILURE, UNSPECIFIED: Chronic | Status: ACTIVE | Noted: 2020-12-22

## 2020-12-24 PROBLEM — E11.9 TYPE 2 DIABETES MELLITUS WITHOUT COMPLICATIONS: Chronic | Status: ACTIVE | Noted: 2020-12-22

## 2020-12-24 LAB
ANION GAP SERPL CALC-SCNC: 14 MMOL/L — SIGNIFICANT CHANGE UP (ref 5–17)
BUN SERPL-MCNC: 17 MG/DL — SIGNIFICANT CHANGE UP (ref 8–20)
CALCIUM SERPL-MCNC: 8.7 MG/DL — SIGNIFICANT CHANGE UP (ref 8.6–10.2)
CHLORIDE SERPL-SCNC: 97 MMOL/L — LOW (ref 98–107)
CO2 SERPL-SCNC: 29 MMOL/L — SIGNIFICANT CHANGE UP (ref 22–29)
CREAT SERPL-MCNC: 0.63 MG/DL — SIGNIFICANT CHANGE UP (ref 0.5–1.3)
GLUCOSE BLDC GLUCOMTR-MCNC: 156 MG/DL — HIGH (ref 70–99)
GLUCOSE BLDC GLUCOMTR-MCNC: 201 MG/DL — HIGH (ref 70–99)
GLUCOSE BLDC GLUCOMTR-MCNC: 212 MG/DL — HIGH (ref 70–99)
GLUCOSE SERPL-MCNC: 133 MG/DL — HIGH (ref 70–99)
HCT VFR BLD CALC: 33.1 % — LOW (ref 34.5–45)
HGB BLD-MCNC: 9.7 G/DL — LOW (ref 11.5–15.5)
LDH SERPL L TO P-CCNC: 76 U/L — SIGNIFICANT CHANGE UP
MCHC RBC-ENTMCNC: 26.1 PG — LOW (ref 27–34)
MCHC RBC-ENTMCNC: 29.3 GM/DL — LOW (ref 32–36)
MCV RBC AUTO: 89 FL — SIGNIFICANT CHANGE UP (ref 80–100)
PLATELET # BLD AUTO: 125 K/UL — LOW (ref 150–400)
POTASSIUM SERPL-MCNC: 4 MMOL/L — SIGNIFICANT CHANGE UP (ref 3.5–5.3)
POTASSIUM SERPL-SCNC: 4 MMOL/L — SIGNIFICANT CHANGE UP (ref 3.5–5.3)
RBC # BLD: 3.72 M/UL — LOW (ref 3.8–5.2)
RBC # FLD: 18.6 % — HIGH (ref 10.3–14.5)
SARS-COV-2 IGG SERPL QL IA: NEGATIVE — SIGNIFICANT CHANGE UP
SARS-COV-2 IGM SERPL IA-ACNC: 0.07 INDEX — SIGNIFICANT CHANGE UP
SODIUM SERPL-SCNC: 140 MMOL/L — SIGNIFICANT CHANGE UP (ref 135–145)
WBC # BLD: 6.34 K/UL — SIGNIFICANT CHANGE UP (ref 3.8–10.5)
WBC # FLD AUTO: 6.34 K/UL — SIGNIFICANT CHANGE UP (ref 3.8–10.5)

## 2020-12-24 PROCEDURE — 99233 SBSQ HOSP IP/OBS HIGH 50: CPT

## 2020-12-24 PROCEDURE — 71045 X-RAY EXAM CHEST 1 VIEW: CPT | Mod: 26

## 2020-12-24 PROCEDURE — 99232 SBSQ HOSP IP/OBS MODERATE 35: CPT

## 2020-12-24 RX ORDER — INFLUENZA VIRUS VACCINE 15; 15; 15; 15 UG/.5ML; UG/.5ML; UG/.5ML; UG/.5ML
0.5 SUSPENSION INTRAMUSCULAR ONCE
Refills: 0 | Status: DISCONTINUED | OUTPATIENT
Start: 2020-12-24 | End: 2021-01-01

## 2020-12-24 RX ADMIN — Medication 12.5 MILLIGRAM(S): at 05:03

## 2020-12-24 RX ADMIN — HEPARIN SODIUM 5000 UNIT(S): 5000 INJECTION INTRAVENOUS; SUBCUTANEOUS at 17:51

## 2020-12-24 RX ADMIN — Medication 81 MILLIGRAM(S): at 14:40

## 2020-12-24 RX ADMIN — Medication 12.5 MILLIGRAM(S): at 14:40

## 2020-12-24 RX ADMIN — Medication 40 MILLIGRAM(S): at 05:03

## 2020-12-24 RX ADMIN — PANTOPRAZOLE SODIUM 40 MILLIGRAM(S): 20 TABLET, DELAYED RELEASE ORAL at 05:03

## 2020-12-24 RX ADMIN — Medication 1: at 14:40

## 2020-12-24 RX ADMIN — Medication 1000 UNIT(S): at 14:40

## 2020-12-24 RX ADMIN — ATORVASTATIN CALCIUM 20 MILLIGRAM(S): 80 TABLET, FILM COATED ORAL at 22:13

## 2020-12-24 RX ADMIN — Medication 5 MILLIGRAM(S): at 14:40

## 2020-12-24 RX ADMIN — Medication 40 MILLIGRAM(S): at 17:51

## 2020-12-24 RX ADMIN — HEPARIN SODIUM 5000 UNIT(S): 5000 INJECTION INTRAVENOUS; SUBCUTANEOUS at 05:03

## 2020-12-24 RX ADMIN — Medication 1 TABLET(S): at 14:40

## 2020-12-24 RX ADMIN — Medication 12.5 MILLIGRAM(S): at 22:14

## 2020-12-24 RX ADMIN — Medication 2: at 18:22

## 2020-12-24 NOTE — PROGRESS NOTE ADULT - SUBJECTIVE AND OBJECTIVE BOX
Subjective: Patient c/o SOB, saturating well on 2L NC. Denies fever, chills, cough, chest pain, and dizziness.     Vital Signs:  Vital Signs Last 24 Hrs  T(C): 36.8 (12-24-20 @ 11:35), Max: 37.1 (12-23-20 @ 15:46)  T(F): 98.3 (12-24-20 @ 11:35), Max: 98.7 (12-23-20 @ 15:46)  HR: 67 (12-24-20 @ 11:35) (64 - 90)  BP: 108/69 (12-24-20 @ 11:35) (100/67 - 108/69)  RR: 19 (12-24-20 @ 11:35) (18 - 20)  SpO2: 97% (12-24-20 @ 11:35) (94% - 98%) on (O2)    I&O's Detail    23 Dec 2020 07:01  -  24 Dec 2020 07:00  --------------------------------------------------------  IN:  Total IN: 0 mL    OUT:    Chest Tube (mL): 950 mL  Total OUT: 950 mL    Total NET: -950 mL      General: NAD  Neurology: Awake, nonfocal, MASTERS x 4  Eyes: Scleras clear, PERRLA/ EOMI, Gross vision intact  ENT: Gross hearing intact, grossly patent pharynx, no stridor  Neck: Neck supple, trachea midline, No JVD  Respiratory: B/L BS  CV: S1S2, no murmurs, rubs or gallops  Abdominal: Soft, NT, ND  Extremities: No edema  Psych: Oriented x 3, normal affect  Tubes: R 14Fr PTC, to WS, no air leak, 950cc out in last 24H    Relevant labs, radiology and Medications reviewed                        9.7    6.34  )-----------( 125      ( 24 Dec 2020 09:22 )             33.1     12-24    140  |  97<L>  |  17.0  ----------------------------<  133<H>  4.0   |  29.0  |  0.63    Ca    8.7      24 Dec 2020 09:22  Mg     1.4     12-22    TPro  7.1  /  Alb  3.8  /  TBili  2.0  /  DBili  x   /  AST  20  /  ALT  7   /  AlkPhos  92  12-22    PT/INR - ( 22 Dec 2020 20:41 )   PT: 15.9 sec;   INR: 1.39 ratio         PTT - ( 22 Dec 2020 20:41 )  PTT:30.6 sec  MEDICATIONS  (STANDING):  aspirin enteric coated 81 milliGRAM(s) Oral daily  atorvastatin 20 milliGRAM(s) Oral at bedtime  cholecalciferol 1000 Unit(s) Oral daily  dextrose 40% Gel 15 Gram(s) Oral once  dextrose 5%. 1000 milliLiter(s) (50 mL/Hr) IV Continuous <Continuous>  dextrose 5%. 1000 milliLiter(s) (100 mL/Hr) IV Continuous <Continuous>  dextrose 50% Injectable 25 Gram(s) IV Push once  dextrose 50% Injectable 12.5 Gram(s) IV Push once  dextrose 50% Injectable 25 Gram(s) IV Push once  furosemide   Injectable 40 milliGRAM(s) IV Push two times a day  glucagon  Injectable 1 milliGRAM(s) IntraMuscular once  heparin   Injectable 5000 Unit(s) SubCutaneous every 12 hours  insulin lispro (ADMELOG) corrective regimen sliding scale   SubCutaneous three times a day before meals  lidocaine 1% (Preservative-free) Injectable 1 milliLiter(s) Local Injection once  lisinopril 2.5 milliGRAM(s) Oral daily  meclizine 12.5 milliGRAM(s) Oral three times a day  metoprolol succinate ER 50 milliGRAM(s) Oral daily  multivitamin 1 Tablet(s) Oral daily  oxybutynin 5 milliGRAM(s) Oral daily  pantoprazole    Tablet 40 milliGRAM(s) Oral before breakfast    MEDICATIONS  (PRN):        Assessment  80y Female  w/ PAST MEDICAL & SURGICAL HISTORY:  Vertigo    Hypertension    DM (diabetes mellitus)    Acute CHF (congestive heart failure)    S/P cholecystectomy    S/P left knee surgery    admitted with complaints of Patient is a 80y old  Female who presents with a chief complaint of Acute decompensated HF  right pleural effusion (23 Dec 2020 19:09)   patient underwent Insertion, chest tube, right pleural space            Subjective: Patient c/o SOB, saturating well on 2L NC. Denies fever, chills, cough, chest pain, and dizziness.     Vital Signs:  Vital Signs Last 24 Hrs  T(C): 36.8 (12-24-20 @ 11:35), Max: 37.1 (12-23-20 @ 15:46)  T(F): 98.3 (12-24-20 @ 11:35), Max: 98.7 (12-23-20 @ 15:46)  HR: 67 (12-24-20 @ 11:35) (64 - 90)  BP: 108/69 (12-24-20 @ 11:35) (100/67 - 108/69)  RR: 19 (12-24-20 @ 11:35) (18 - 20)  SpO2: 97% (12-24-20 @ 11:35) (94% - 98%) on (O2)    I&O's Detail    23 Dec 2020 07:01  -  24 Dec 2020 07:00  --------------------------------------------------------  IN:  Total IN: 0 mL    OUT:    Chest Tube (mL): 950 mL  Total OUT: 950 mL    Total NET: -950 mL      General: NAD  Neurology: Awake, nonfocal, MASTERS x 4  Eyes: Scleras clear, PERRLA/ EOMI, Gross vision intact  ENT: Gross hearing intact, grossly patent pharynx, no stridor  Neck: Neck supple, trachea midline, No JVD  Respiratory: B/L BS  CV: S1S2, no murmurs, rubs or gallops  Abdominal: Soft, NT, ND  Extremities: No edema  Psych: Oriented x 3, normal affect  Tubes: R 14Fr PTC, to WS, no air leak, 950cc out in last 24H, tube flushed with 10cc sterile NS for patency    Relevant labs, radiology and Medications reviewed                        9.7    6.34  )-----------( 125      ( 24 Dec 2020 09:22 )             33.1     12-24    140  |  97<L>  |  17.0  ----------------------------<  133<H>  4.0   |  29.0  |  0.63    Ca    8.7      24 Dec 2020 09:22  Mg     1.4     12-22    TPro  7.1  /  Alb  3.8  /  TBili  2.0  /  DBili  x   /  AST  20  /  ALT  7   /  AlkPhos  92  12-22    PT/INR - ( 22 Dec 2020 20:41 )   PT: 15.9 sec;   INR: 1.39 ratio         PTT - ( 22 Dec 2020 20:41 )  PTT:30.6 sec  MEDICATIONS  (STANDING):  aspirin enteric coated 81 milliGRAM(s) Oral daily  atorvastatin 20 milliGRAM(s) Oral at bedtime  cholecalciferol 1000 Unit(s) Oral daily  dextrose 40% Gel 15 Gram(s) Oral once  dextrose 5%. 1000 milliLiter(s) (50 mL/Hr) IV Continuous <Continuous>  dextrose 5%. 1000 milliLiter(s) (100 mL/Hr) IV Continuous <Continuous>  dextrose 50% Injectable 25 Gram(s) IV Push once  dextrose 50% Injectable 12.5 Gram(s) IV Push once  dextrose 50% Injectable 25 Gram(s) IV Push once  furosemide   Injectable 40 milliGRAM(s) IV Push two times a day  glucagon  Injectable 1 milliGRAM(s) IntraMuscular once  heparin   Injectable 5000 Unit(s) SubCutaneous every 12 hours  insulin lispro (ADMELOG) corrective regimen sliding scale   SubCutaneous three times a day before meals  lidocaine 1% (Preservative-free) Injectable 1 milliLiter(s) Local Injection once  lisinopril 2.5 milliGRAM(s) Oral daily  meclizine 12.5 milliGRAM(s) Oral three times a day  metoprolol succinate ER 50 milliGRAM(s) Oral daily  multivitamin 1 Tablet(s) Oral daily  oxybutynin 5 milliGRAM(s) Oral daily  pantoprazole    Tablet 40 milliGRAM(s) Oral before breakfast    MEDICATIONS  (PRN):        Assessment  80y Female  w/ PAST MEDICAL & SURGICAL HISTORY:  Vertigo    Hypertension    DM (diabetes mellitus)    Acute CHF (congestive heart failure)    S/P cholecystectomy    S/P left knee surgery    admitted with complaints of Patient is a 80y old  Female who presents with a chief complaint of Acute decompensated HF  right pleural effusion (23 Dec 2020 19:09)   patient underwent Insertion, chest tube, right pleural space

## 2020-12-24 NOTE — PROGRESS NOTE ADULT - SUBJECTIVE AND OBJECTIVE BOX
ERIKA ORTIZ  [id number]      Chief Complaint:  Acute CHF    Interval History:  Patient feels better but still with SOB with any movement.  Denies CP, palps.  Edema better but still significant    Tele:  No events      aspirin enteric coated 81 milliGRAM(s) Oral daily  atorvastatin 20 milliGRAM(s) Oral at bedtime  cholecalciferol 1000 Unit(s) Oral daily  dextrose 40% Gel 15 Gram(s) Oral once  dextrose 5%. 1000 milliLiter(s) IV Continuous <Continuous>  dextrose 5%. 1000 milliLiter(s) IV Continuous <Continuous>  dextrose 50% Injectable 25 Gram(s) IV Push once  dextrose 50% Injectable 12.5 Gram(s) IV Push once  dextrose 50% Injectable 25 Gram(s) IV Push once  furosemide   Injectable 40 milliGRAM(s) IV Push two times a day  glucagon  Injectable 1 milliGRAM(s) IntraMuscular once  heparin   Injectable 5000 Unit(s) SubCutaneous every 12 hours  insulin lispro (ADMELOG) corrective regimen sliding scale   SubCutaneous three times a day before meals  lidocaine 1% (Preservative-free) Injectable 1 milliLiter(s) Local Injection once  lisinopril 2.5 milliGRAM(s) Oral daily  meclizine 12.5 milliGRAM(s) Oral three times a day  metoprolol succinate ER 50 milliGRAM(s) Oral daily  multivitamin 1 Tablet(s) Oral daily  oxybutynin 5 milliGRAM(s) Oral daily  pantoprazole    Tablet 40 milliGRAM(s) Oral before breakfast          Physical Exam:  T(C): 36.8 (12-24-20 @ 11:35), Max: 37.1 (12-23-20 @ 15:46)  HR: 67 (12-24-20 @ 11:35) (64 - 90)  BP: 108/69 (12-24-20 @ 11:35) (100/67 - 108/69)  RR: 19 (12-24-20 @ 11:35) (18 - 20)  SpO2: 97% (12-24-20 @ 11:35) (94% - 98%)  Wt(kg): --  General: Comfortable in NAD at 30 degrees  Neck: Mild JVD  CVS: nl s1s2, no s3  Pulm: Diminished with crackles at bases b/l  Abd: soft, non-tender  Ext: 2+ LE edema b/l to thighs  Neuro A&O x3  Psych: Normal affect      Labs:   24 Dec 2020 09:22    140    |  97     |  17.0   ----------------------------<  133    4.0     |  29.0   |  0.63     Ca    8.7        24 Dec 2020 09:22  Mg     1.4       22 Dec 2020 20:42    TPro  7.1    /  Alb  3.8    /  TBili  2.0    /  DBili  x      /  AST  20     /  ALT  7      /  AlkPhos  92     22 Dec 2020 20:42                          9.7    6.34  )-----------( 125      ( 24 Dec 2020 09:22 )             33.1     PT/INR - ( 22 Dec 2020 20:41 )   PT: 15.9 sec;   INR: 1.39 ratio         PTT - ( 22 Dec 2020 20:41 )  PTT:30.6 sec  CARDIAC MARKERS ( 22 Dec 2020 20:42 )  x     / 0.06 ng/mL / x     / x     / x          Echo personally reviewed:   1. Left ventricular ejection fraction, by visual estimation, is 30 to 35%.   2. Severely decreased global left ventricular systolic function.   3. Entire septum is akinetic.   4. Elevated left atrial and left ventricular end-diastolic pressures.   5. Severely increased LV wall thickness.   6. Spectral Doppler shows pseudonormal pattern of left ventricular myocardial filling (Grade II diastolic dysfunction).   7. Mild thickening and calcification of the anterior and posterior mitral valve leaflets.   8. Moderate mitral annular calcification.   9. A mobile echodensity on the mitral valve, ?mobile calcium, cannot r/o vegetation.  10. Moderate mitral valve regurgitation.  11. Estimated pulmonary artery systolic pressure is 58.1 mmHg assuming a right atrial pressure of 8 mmHg, which is consistent with moderate pulmonary hypertension.  12. Mild aortic regurgitation.  13. Peak transaortic gradient equals 95.5 mmHg, mean transaortic gradient equals 65.0 mmHg, the calculated aortic valve area equals 0.35 cm² by the continuity equation consistent with severe aortic stenosis.  14. Dilatation of the ascending aorta.  15. Endocardial visualization was enhanced with intravenous echo contrast.          Assessment:    80y Female with HTN, DM, aortic stenosis here with increasing dyspnea and leg swelling due to acute congestive heart failure (EF unknown). Also with large pleural effusion on the right s/p chest tube. Suspect all related to CHF but fluid results still pending.   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  Also with very severe AS.  Has at least mod MR, ?relationship to AS.  Small echodensity on MV may represent Ca, but need to r/o SBE.  No other evidence of SBE.  Will need JOE.  -Improving with diuresis.      Plan:  1. Plan JOE and LHC Monday/Tuesday.  JOE schedule for Monday morning, NPO post MN Sunday.  LHC likely after that.  2. Continue IV Lasix 40mg bid and monitor renal function and lytes  3. Continue low dose ACEI and beta blocker. Will consider transition to Entresto if BP stable.  4. ASA/statin  5. Daily weights, strict I/Os  6. 1.5 L fluid restriction  7. Further decisions will be based on JOE/LHC.    D/e Dr. Graf.

## 2020-12-24 NOTE — PROGRESS NOTE ADULT - ASSESSMENT
79yo F with pmh of chf, DM, HTN, HLD presented to ED c/o worsening SOB, b/l LE swellings and orthopnea and cough for the past several days. Pt reports to sob even at rest and the swellings in her legs has been causing her difficulty with walking. CT Chest with hepatic cirrhosis and ascites. She saw her PMD who told her that she has fluid in her lung, and sent her to ED. Found to have RT pleural effusion, s/p pigtail catheter insertion. 850cc serous fluid removed. Cultures NGTD. Cytology pending.     PLAN  Pain management  Encourage coughing, deep breathing and use of incentive spirometry. Wean off supplemental oxygen as able. Daily CXR.   maintain chest tube to water seal  Record output per shift in flowsheets and record on pleurevac with date and time  Pleural effusion likely hepatic or cardiac in nature  High likelihood of recurrence, will maintain tube throughout this hospitalization and remove upon discharge  Patient not a candidate for PleurX  Recommend Medical management /optimization of liver and heart disease at this time    Discussed with Dr. Rasheed ANDINO  Thoracic Surgery

## 2020-12-24 NOTE — PROGRESS NOTE ADULT - ASSESSMENT
81yo F with pmh of chf, DM, HTN, HLD presented to ED c/o worsening SOB, b/l LE swellings and orthopnea and cough for the past several days. Pt reports to sob even at rest and the swellings in her legs has been causing her difficulty with walking. She saw her PMD who told her that she has fluid in her lung, and sent her to ED. Denies cp, fever, chills, n/v. In the ED found to have elevated BNP 19690, CT chest with large right pleural effusion.     Acute decompensated HF with large rt pleural effusion s/p CT placement  -Elevated BNP 19690, diffused b/l LE edema   -CT chest with large right Pleural effusion  - EF 35-40% on TTE yesterday  -IV lasix BID, BB acei  - strict I/O, daily weight, DASH diet   - US arterial LE   -[facility] cardiology consulted     Right large pleural effusion  s/p chest tube   -f/u fluid analysis, ct surgery rec  -supplement oxygen as needed   -cont with IV Lasix     Diabetes   -hold home po meds  -ISS, hypoglycemic protocol, FSG  -check A1c     HTN/HLD  -cont with Lisinopril and metoprolol with holding parameter   -cont with atorvastatin 20mg daily   GI ppx ppi  DVT ppx   -heparin    care of plan dw pt. Pt is AAOX3. [facility] PT- PT WANTS TO BE FULL CODE INCLUDE INTUBATION, RESUSCITATION        79yo F with pmh of chf, DM, HTN, HLD presented to ED c/o worsening SOB, b/l LE swellings and orthopnea and cough for the past several days. Pt reports to sob even at rest and the swellings in her legs has been causing her difficulty with walking. She saw her PMD who told her that she has fluid in her lung, and sent her to ED. Denies cp, fever, chills, n/v. In the ED found to have elevated BNP 19690, CT chest with large right pleural effusion.     Acute decompensated HF with large rt pleural effusion s/p CT placement  -Elevated BNP 19690, diffused b/l LE edema   -CT chest with large right Pleural effusion  - EF 35-40% on TTE yesterday  -IV lasix BID, BB acei  - strict I/O, daily weight, DASH diet   - US arterial LE       Severe AS With hypodensity MV  spoke withn cardiology. plan for JOE,Cath Monday  f/u further rec    Right large pleural effusion  s/p chest tube   -f/u fluid analysis, ct surgery rec  -supplement oxygen as needed   -cont with IV Lasix     Diabetes   -hold home po meds  -ISS, hypoglycemic protocol, FSG  -check A1c     HTN/HLD  -cont with Lisinopril and metoprolol with holding parameter   -cont with atorvastatin 20mg daily   GI ppx ppi  DVT ppx   -heparin  will resume diet as procedure cancel  care of plan dw pt. Pt is AAOX3. [facility] PT- PT WANTS TO BE FULL CODE INCLUDE INTUBATION, RESUSCITATION . Pt has friend Oliva but pt wants to make her own decision and does not want to involve her in care now.  [facility] cardiology  tarsha rn

## 2020-12-24 NOTE — PROGRESS NOTE ADULT - SUBJECTIVE AND OBJECTIVE BOX
ERIKA ORTIZ  80y  Female    Interval/overnight events/pt complaints:            ROS:  GEN: Denies fever, chills, sweats, weakness, general malaise  HEENT: Denies acute hearing or vision loss, dysphagia, sore throat  RESPIRATORY: Denies, dyspnea, orthopnea, wheeze, cough, purlent sputum or hemoptysis  CARDIAC: Denies CP, palpitations, dizziness, pedal edema  GI: Appetite good, Denies N/V, diarrhea, constipation, melena, hematochezia  : Denies difficulty urinating, dysuria, hematuria, recent menses, flank pain  NEURO: Denies headache, dizziness, numbness/tingling, weakness  MS: Denies aches/pains, loss of FROM from baseline  SKIN: Denies rash, sores/wounds, itching  ENDOCRINE: Following diabetic diet     Vital Signs Last 24 Hrs  T(C): 36.8 (24 Dec 2020 11:35), Max: 37.1 (23 Dec 2020 15:46)  T(F): 98.3 (24 Dec 2020 11:35), Max: 98.7 (23 Dec 2020 15:46)  HR: 67 (24 Dec 2020 11:35) (64 - 90)  BP: 108/69 (24 Dec 2020 11:35) (100/67 - 108/69)  BP(mean): 81 (24 Dec 2020 04:22) (78 - 81)  RR: 19 (24 Dec 2020 11:35) (18 - 20)  SpO2: 97% (24 Dec 2020 11:35) (94% - 98%)  I&O's Summary    23 Dec 2020 07:01  -  24 Dec 2020 07:00  --------------------------------------------------------  IN: 0 mL / OUT: 950 mL / NET: -950 mL        PHYSICAL EXAM:  GENERAL: NAD, nontoxic appearing  HEENT -   NECK:   CHEST/LUNG:   HEART  ABDOMEN:  EXTREMITIES:    NEURO:    SKIN:     CAPILLARY BLOOD GLUCOSE      POCT Blood Glucose.: 156 mg/dL (24 Dec 2020 14:25)  POCT Blood Glucose.: 178 mg/dL (23 Dec 2020 21:34)  POCT Blood Glucose.: 205 mg/dL (23 Dec 2020 16:23)      LABS    (12-24 @ 09:22)                      9.7  6.34 )-----------( 125                 33.1    Neutrophils = -- (--%)  Lymphocytes = -- (--%)  Eosinophils = -- (--%)  Basophils = -- (--%)  Monocytes = -- (--%)  Bands = --%    12-24    140  |  97<L>  |  17.0  ----------------------------<  133<H>  4.0   |  29.0  |  0.63    Ca    8.7      24 Dec 2020 09:22  Mg     1.4     12-22    TPro  7.1  /  Alb  3.8  /  TBili  2.0  /  DBili  x   /  AST  20  /  ALT  7   /  AlkPhos  92  12-22    ( 22 Dec 2020 20:41 )   PT: 15.9 sec;   INR: 1.39 ratio;       PTT:30.6 sec  CARDIAC MARKERS ( 22 Dec 2020 20:42 )  Trop 0.06 ng/mL / CK x     / CKMB x           (12-22 @ 19:14)  NotDetec          IMAGING    < from: Xray Chest 1 View- PORTABLE-Routine (Xray Chest 1 View- PORTABLE-Routine in AM.) (12.24.20 @ 04:53) >  IMPRESSION:   RIGHT multi-sidehole pigtail catheter overlies RIGHT lower hemithorax.  . Diffuse of lower lobe haziness indicating pleural effusion and/or basilar airspace disease.  < end of copied text >    < from: TTE Echo Complete w/o Contrast w/ Doppler (12.23.20 @ 16:48) >  Summary:   1. Left ventricular ejection fraction, by visual estimation, is 35 to 40%.   2. Moderately decreased global left ventricular systolic function.   3. Entireseptum is abnormal as described above.   4. Elevated left atrial and left ventricular end-diastolic pressures.   5. Severely increased LV wall thickness.   6. Spectral Doppler shows pseudonormal pattern of left ventricular myocardial filling (Grade II diastolic dysfunction).   7. Mild thickening and calcification of the anterior and posterior mitral valve leaflets.   8. Moderate mitral annular calcification.   9. A mobile echodensity on the mitral valve. Clinical correlations is recommended. Consider JOE if clinically indicated.  10. Moderate to severe mitral valve regurgitation.  11. Estimated pulmonary artery systolic pressure is 58.1 mmHg assuming a right atrial pressure of 8 mmHg, which is consistent with moderate pulmonary hypertension.  12. Mild aortic regurgitation.  13. Peak transaortic gradient equals 95.5 mmHg, mean transaortic gradient equals 65.0 mmHg, the calculated aortic valve area equals 0.35 cm² by the continuity equation consistent with severe aortic stenosis.  14. Dilatation of the ascending aorta.  15. Endocardial visualization was enhanced with intravenous echo contrast.  16. Case d/w Dr. Maddox at the time of the conclusion of the study.    < end of copied text >    Imaging Personally Reviewed:  [ x] YES  [ ] NO    MEDICATIONS  (STANDING):  aspirin enteric coated 81 milliGRAM(s) Oral daily  atorvastatin 20 milliGRAM(s) Oral at bedtime  cholecalciferol 1000 Unit(s) Oral daily  dextrose 40% Gel 15 Gram(s) Oral once  dextrose 5%. 1000 milliLiter(s) (50 mL/Hr) IV Continuous <Continuous>  dextrose 5%. 1000 milliLiter(s) (100 mL/Hr) IV Continuous <Continuous>  dextrose 50% Injectable 25 Gram(s) IV Push once  dextrose 50% Injectable 12.5 Gram(s) IV Push once  dextrose 50% Injectable 25 Gram(s) IV Push once  furosemide   Injectable 40 milliGRAM(s) IV Push two times a day  glucagon  Injectable 1 milliGRAM(s) IntraMuscular once  heparin   Injectable 5000 Unit(s) SubCutaneous every 12 hours  insulin lispro (ADMELOG) corrective regimen sliding scale   SubCutaneous three times a day before meals  lidocaine 1% (Preservative-free) Injectable 1 milliLiter(s) Local Injection once  lisinopril 2.5 milliGRAM(s) Oral daily  meclizine 12.5 milliGRAM(s) Oral three times a day  metoprolol succinate ER 50 milliGRAM(s) Oral daily  multivitamin 1 Tablet(s) Oral daily  oxybutynin 5 milliGRAM(s) Oral daily  pantoprazole    Tablet 40 milliGRAM(s) Oral before breakfast    MEDICATIONS  (PRN):    AllergiesNo Known Allergies      Consultant(s) Notes Reviewed:  [x ] YES  [ ] NO  Care Discussed with Consultants/Other Providers [ x] YES  [ ] NO     pt is c/o Chest tube side pain, breathing stable with NC. no palpitation,cough,fever,chill,legs pain          ROS:  GEN: Denies fever, chills, sweats, weakness, general malaise  HEENT: Denies acute hearing or vision loss, dysphagia, sore throat  RESPIRATORY: Denies, dyspnea, orthopnea, wheeze, cough, purlent sputum or hemoptysis  CARDIAC: Denies CP, palpitations, dizziness, pedal edema  GI: Appetite good, Denies N/V, diarrhea, constipation, melena, hematochezia  : Denies difficulty urinating, dysuria, hematuria, recent menses, flank pain  NEURO: Denies headache, dizziness, numbness/tingling, weakness  MS: Denies aches/pains, loss of FROM from baseline  SKIN: Denies rash, sores/wounds, itching  ENDOCRINE: Following diabetic diet     Vital Signs Last 24 Hrs  T(C): 36.8 (24 Dec 2020 11:35), Max: 37.1 (23 Dec 2020 15:46)  T(F): 98.3 (24 Dec 2020 11:35), Max: 98.7 (23 Dec 2020 15:46)  HR: 67 (24 Dec 2020 11:35) (64 - 90)  BP: 108/69 (24 Dec 2020 11:35) (100/67 - 108/69)  BP(mean): 81 (24 Dec 2020 04:22) (78 - 81)  RR: 19 (24 Dec 2020 11:35) (18 - 20)  SpO2: 97% (24 Dec 2020 11:35) (94% - 98%)  I&O's Summary    23 Dec 2020 07:01  -  24 Dec 2020 07:00  --------------------------------------------------------  IN: 0 mL / OUT: 950 mL / NET: -950 mL        T(C): 36.8 (12-24-20 @ 11:35), Max: 37.1 (12-23-20 @ 15:46)  HR: 67 (12-24-20 @ 11:35) (64 - 90)  BP: 108/69 (12-24-20 @ 11:35) (100/67 - 108/69)  RR: 19 (12-24-20 @ 11:35) (18 - 20)  SpO2: 97% (12-24-20 @ 11:35) (94% - 98%)    GEN - NAD  HEENT - NCAT, EOMI, CHADWICK,   RESP -+ airentry. rhonchi b/l bases. decrease breath sound rt chest. chest tube placed.   CARDIO - NS1S2, RRR. No murmurs/rubs/gallops.  ABD - Soft/Non tender/Non distended. Normal BS x4 quadrants.   Ext - + YOUSUF.   MSK - full ROM of BL upper and lower extremities without pain or restriction. BL 5/5 strength on upper and lower extremities.   Neuro - cn 2-12 grossly intact.  no visible seizure activity appreciated. no tremor. gait not observed.     Psych- AAOx3.  attentive. normal affect.      POCT Blood Glucose.: 156 mg/dL (24 Dec 2020 14:25)  POCT Blood Glucose.: 178 mg/dL (23 Dec 2020 21:34)  POCT Blood Glucose.: 205 mg/dL (23 Dec 2020 16:23)      LABS    (12-24 @ 09:22)                      9.7  6.34 )-----------( 125                 33.1    Neutrophils = -- (--%)  Lymphocytes = -- (--%)  Eosinophils = -- (--%)  Basophils = -- (--%)  Monocytes = -- (--%)  Bands = --%    12-24    140  |  97<L>  |  17.0  ----------------------------<  133<H>  4.0   |  29.0  |  0.63    Ca    8.7      24 Dec 2020 09:22  Mg     1.4     12-22    TPro  7.1  /  Alb  3.8  /  TBili  2.0  /  DBili  x   /  AST  20  /  ALT  7   /  AlkPhos  92  12-22    ( 22 Dec 2020 20:41 )   PT: 15.9 sec;   INR: 1.39 ratio;       PTT:30.6 sec  CARDIAC MARKERS ( 22 Dec 2020 20:42 )  Trop 0.06 ng/mL / CK x     / CKMB x           (12-22 @ 19:14)  NotDetec          IMAGING    < from: Xray Chest 1 View- PORTABLE-Routine (Xray Chest 1 View- PORTABLE-Routine in AM.) (12.24.20 @ 04:53) >  IMPRESSION:   RIGHT multi-sidehole pigtail catheter overlies RIGHT lower hemithorax.  . Diffuse of lower lobe haziness indicating pleural effusion and/or basilar airspace disease.  < end of copied text >    < from: TTE Echo Complete w/o Contrast w/ Doppler (12.23.20 @ 16:48) >  Summary:   1. Left ventricular ejection fraction, by visual estimation, is 35 to 40%.   2. Moderately decreased global left ventricular systolic function.   3. Entireseptum is abnormal as described above.   4. Elevated left atrial and left ventricular end-diastolic pressures.   5. Severely increased LV wall thickness.   6. Spectral Doppler shows pseudonormal pattern of left ventricular myocardial filling (Grade II diastolic dysfunction).   7. Mild thickening and calcification of the anterior and posterior mitral valve leaflets.   8. Moderate mitral annular calcification.   9. A mobile echodensity on the mitral valve. Clinical correlations is recommended. Consider JOE if clinically indicated.  10. Moderate to severe mitral valve regurgitation.  11. Estimated pulmonary artery systolic pressure is 58.1 mmHg assuming a right atrial pressure of 8 mmHg, which is consistent with moderate pulmonary hypertension.  12. Mild aortic regurgitation.  13. Peak transaortic gradient equals 95.5 mmHg, mean transaortic gradient equals 65.0 mmHg, the calculated aortic valve area equals 0.35 cm² by the continuity equation consistent with severe aortic stenosis.  14. Dilatation of the ascending aorta.  15. Endocardial visualization was enhanced with intravenous echo contrast.  16. Case d/w Dr. Maddox at the time of the conclusion of the study.    < end of copied text >    Imaging Personally Reviewed:  [ x] YES  [ ] NO    MEDICATIONS  (STANDING):  aspirin enteric coated 81 milliGRAM(s) Oral daily  atorvastatin 20 milliGRAM(s) Oral at bedtime  cholecalciferol 1000 Unit(s) Oral daily  dextrose 40% Gel 15 Gram(s) Oral once  dextrose 5%. 1000 milliLiter(s) (50 mL/Hr) IV Continuous <Continuous>  dextrose 5%. 1000 milliLiter(s) (100 mL/Hr) IV Continuous <Continuous>  dextrose 50% Injectable 25 Gram(s) IV Push once  dextrose 50% Injectable 12.5 Gram(s) IV Push once  dextrose 50% Injectable 25 Gram(s) IV Push once  furosemide   Injectable 40 milliGRAM(s) IV Push two times a day  glucagon  Injectable 1 milliGRAM(s) IntraMuscular once  heparin   Injectable 5000 Unit(s) SubCutaneous every 12 hours  insulin lispro (ADMELOG) corrective regimen sliding scale   SubCutaneous three times a day before meals  lidocaine 1% (Preservative-free) Injectable 1 milliLiter(s) Local Injection once  lisinopril 2.5 milliGRAM(s) Oral daily  meclizine 12.5 milliGRAM(s) Oral three times a day  metoprolol succinate ER 50 milliGRAM(s) Oral daily  multivitamin 1 Tablet(s) Oral daily  oxybutynin 5 milliGRAM(s) Oral daily  pantoprazole    Tablet 40 milliGRAM(s) Oral before breakfast    MEDICATIONS  (PRN):    AllergiesNo Known Allergies      Consultant(s) Notes Reviewed:  [x ] YES  [ ] NO  Care Discussed with Consultants/Other Providers [ x] YES  [ ] NO

## 2020-12-25 LAB
ANION GAP SERPL CALC-SCNC: 13 MMOL/L — SIGNIFICANT CHANGE UP (ref 5–17)
BUN SERPL-MCNC: 16 MG/DL — SIGNIFICANT CHANGE UP (ref 8–20)
CALCIUM SERPL-MCNC: 8.7 MG/DL — SIGNIFICANT CHANGE UP (ref 8.6–10.2)
CHLORIDE SERPL-SCNC: 98 MMOL/L — SIGNIFICANT CHANGE UP (ref 98–107)
CO2 SERPL-SCNC: 29 MMOL/L — SIGNIFICANT CHANGE UP (ref 22–29)
CREAT SERPL-MCNC: 0.51 MG/DL — SIGNIFICANT CHANGE UP (ref 0.5–1.3)
GLUCOSE BLDC GLUCOMTR-MCNC: 164 MG/DL — HIGH (ref 70–99)
GLUCOSE BLDC GLUCOMTR-MCNC: 205 MG/DL — HIGH (ref 70–99)
GLUCOSE BLDC GLUCOMTR-MCNC: 207 MG/DL — HIGH (ref 70–99)
GLUCOSE BLDC GLUCOMTR-MCNC: 215 MG/DL — HIGH (ref 70–99)
GLUCOSE SERPL-MCNC: 145 MG/DL — HIGH (ref 70–99)
HCT VFR BLD CALC: 31.3 % — LOW (ref 34.5–45)
HGB BLD-MCNC: 9.4 G/DL — LOW (ref 11.5–15.5)
MCHC RBC-ENTMCNC: 26.6 PG — LOW (ref 27–34)
MCHC RBC-ENTMCNC: 30 GM/DL — LOW (ref 32–36)
MCV RBC AUTO: 88.7 FL — SIGNIFICANT CHANGE UP (ref 80–100)
PLATELET # BLD AUTO: 117 K/UL — LOW (ref 150–400)
POTASSIUM SERPL-MCNC: 3.1 MMOL/L — LOW (ref 3.5–5.3)
POTASSIUM SERPL-SCNC: 3.1 MMOL/L — LOW (ref 3.5–5.3)
RBC # BLD: 3.53 M/UL — LOW (ref 3.8–5.2)
RBC # FLD: 18.5 % — HIGH (ref 10.3–14.5)
SODIUM SERPL-SCNC: 140 MMOL/L — SIGNIFICANT CHANGE UP (ref 135–145)
WBC # BLD: 6.76 K/UL — SIGNIFICANT CHANGE UP (ref 3.8–10.5)
WBC # FLD AUTO: 6.76 K/UL — SIGNIFICANT CHANGE UP (ref 3.8–10.5)

## 2020-12-25 PROCEDURE — 71045 X-RAY EXAM CHEST 1 VIEW: CPT | Mod: 26

## 2020-12-25 PROCEDURE — 99233 SBSQ HOSP IP/OBS HIGH 50: CPT

## 2020-12-25 PROCEDURE — 99232 SBSQ HOSP IP/OBS MODERATE 35: CPT

## 2020-12-25 RX ORDER — POTASSIUM CHLORIDE 20 MEQ
40 PACKET (EA) ORAL ONCE
Refills: 0 | Status: COMPLETED | OUTPATIENT
Start: 2020-12-25 | End: 2020-12-25

## 2020-12-25 RX ORDER — ACETAMINOPHEN 500 MG
1000 TABLET ORAL ONCE
Refills: 0 | Status: COMPLETED | OUTPATIENT
Start: 2020-12-25 | End: 2020-12-25

## 2020-12-25 RX ADMIN — Medication 50 MILLIGRAM(S): at 05:12

## 2020-12-25 RX ADMIN — HEPARIN SODIUM 5000 UNIT(S): 5000 INJECTION INTRAVENOUS; SUBCUTANEOUS at 16:47

## 2020-12-25 RX ADMIN — Medication 12.5 MILLIGRAM(S): at 05:12

## 2020-12-25 RX ADMIN — HEPARIN SODIUM 5000 UNIT(S): 5000 INJECTION INTRAVENOUS; SUBCUTANEOUS at 05:12

## 2020-12-25 RX ADMIN — Medication 1: at 08:17

## 2020-12-25 RX ADMIN — Medication 40 MILLIGRAM(S): at 05:12

## 2020-12-25 RX ADMIN — Medication 2: at 16:55

## 2020-12-25 RX ADMIN — Medication 1000 UNIT(S): at 10:13

## 2020-12-25 RX ADMIN — LISINOPRIL 2.5 MILLIGRAM(S): 2.5 TABLET ORAL at 05:12

## 2020-12-25 RX ADMIN — PANTOPRAZOLE SODIUM 40 MILLIGRAM(S): 20 TABLET, DELAYED RELEASE ORAL at 05:12

## 2020-12-25 RX ADMIN — Medication 12.5 MILLIGRAM(S): at 21:39

## 2020-12-25 RX ADMIN — Medication 5 MILLIGRAM(S): at 10:13

## 2020-12-25 RX ADMIN — Medication 40 MILLIEQUIVALENT(S): at 12:44

## 2020-12-25 RX ADMIN — Medication 1 TABLET(S): at 10:13

## 2020-12-25 RX ADMIN — Medication 400 MILLIGRAM(S): at 10:13

## 2020-12-25 RX ADMIN — ATORVASTATIN CALCIUM 20 MILLIGRAM(S): 80 TABLET, FILM COATED ORAL at 21:39

## 2020-12-25 RX ADMIN — Medication 2: at 12:35

## 2020-12-25 RX ADMIN — Medication 12.5 MILLIGRAM(S): at 16:48

## 2020-12-25 RX ADMIN — Medication 81 MILLIGRAM(S): at 10:32

## 2020-12-25 NOTE — PROGRESS NOTE ADULT - ASSESSMENT
81yo F with pmh of chf, DM, HTN, HLD presented to ED c/o worsening SOB, b/l LE swellings and orthopnea and cough for the past several days. Pt reports to sob even at rest and the swellings in her legs has been causing her difficulty with walking. She saw her PMD who told her that she has fluid in her lung, and sent her to ED. Denies cp, fever, chills, n/v. In the ED found to have elevated BNP 19690, CT chest with large right pleural effusion.     Acute decompensated HF with large rt pleural effusion s/p CT placement  -Elevated BNP 19690, diffused b/l LE edema at ed  -CT chest with large right Pleural effusion  - EF 30-35 % on TTE  -IV lasix BID, BB acei  - strict I/O, daily weight, DASH diet   - US arterial LE stable      Severe AS With hypodensity MV   plan for JOE,Cath Monday  f/u further rec    Right large pleural effusion  s/p chest tube   -f/u fluid analysis, ct surgery rec  -supplement oxygen as needed   -cont with IV Lasix       Hypokalemia  -replace  Diabetes   -hold home po meds  -ISS, hypoglycemic protocol, FSG  -A1c 6.7    HTN/HLD  -cont with Lisinopril and metoprolol with holding parameter   -cont with atorvastatin 20mg daily   GI ppx ppi  DVT ppx   -heparin    care of plan dw pt. Pt is AAOX3. [facility] PT- PT WANTS TO BE FULL CODE INCLUDE INTUBATION, RESUSCITATION . Pt has friend Oliva but pt wants to make her own decision and does not want to involve her in care now.  tarsha rn

## 2020-12-25 NOTE — PROGRESS NOTE ADULT - ASSESSMENT
81yo F with pmh of chf, DM, HTN, HLD presented to ED c/o worsening SOB, b/l LE swellings and orthopnea and cough for the past several days. Pt reports to sob even at rest and the swellings in her legs has been causing her difficulty with walking. CT Chest with hepatic cirrhosis and ascites. She saw her PMD who told her that she has fluid in her lung, and sent her to ED. Found to have RT pleural effusion, s/p pigtail catheter insertion. 850cc serous fluid removed. Cultures NGTD. Cytology pending.           PLAN  Pain management  Encourage coughing, deep breathing and use of incentive spirometry. Wean off supplemental oxygen as able. Daily CXR.   maintain chest tube to water seal  Record output per shift in flowsheets and record on pleurevac with date and time  Pleural effusion likely hepatic or cardiac in nature  High likelihood of recurrence, will maintain tube throughout this hospitalization and remove upon discharge  Patient not a candidate for PleurX  Recommend Medical management /optimization of liver and heart disease at this time    Discussed with Dr. Iqbal

## 2020-12-25 NOTE — PROGRESS NOTE ADULT - SUBJECTIVE AND OBJECTIVE BOX
Patient is a 80y old  Female who presents with a chief complaint of Acute decompensated HF  right pleural effusion (24 Dec 2020 14:33)    Pt is comfortable except pain at chest tube side. No sob,fever, chill, mild cough. On 3L NC.    REVIEW OF SYSTEMS: All systems are reviewed and found to be negative except above    MEDICATIONS  (STANDING):  aspirin enteric coated 81 milliGRAM(s) Oral daily  atorvastatin 20 milliGRAM(s) Oral at bedtime  cholecalciferol 1000 Unit(s) Oral daily  dextrose 40% Gel 15 Gram(s) Oral once  dextrose 5%. 1000 milliLiter(s) (50 mL/Hr) IV Continuous <Continuous>  dextrose 5%. 1000 milliLiter(s) (100 mL/Hr) IV Continuous <Continuous>  dextrose 50% Injectable 25 Gram(s) IV Push once  dextrose 50% Injectable 12.5 Gram(s) IV Push once  dextrose 50% Injectable 25 Gram(s) IV Push once  furosemide   Injectable 40 milliGRAM(s) IV Push two times a day  glucagon  Injectable 1 milliGRAM(s) IntraMuscular once  heparin   Injectable 5000 Unit(s) SubCutaneous every 12 hours  influenza   Vaccine 0.5 milliLiter(s) IntraMuscular once  insulin lispro (ADMELOG) corrective regimen sliding scale   SubCutaneous three times a day before meals  lidocaine 1% (Preservative-free) Injectable 1 milliLiter(s) Local Injection once  lisinopril 2.5 milliGRAM(s) Oral daily  meclizine 12.5 milliGRAM(s) Oral three times a day  metoprolol succinate ER 50 milliGRAM(s) Oral daily  multivitamin 1 Tablet(s) Oral daily  oxybutynin 5 milliGRAM(s) Oral daily  pantoprazole    Tablet 40 milliGRAM(s) Oral before breakfast  potassium chloride    Tablet ER 40 milliEquivalent(s) Oral once    MEDICATIONS  (PRN):      CAPILLARY BLOOD GLUCOSE      POCT Blood Glucose.: 215 mg/dL (25 Dec 2020 12:08)  POCT Blood Glucose.: 164 mg/dL (25 Dec 2020 08:02)  POCT Blood Glucose.: 212 mg/dL (24 Dec 2020 22:30)  POCT Blood Glucose.: 201 mg/dL (24 Dec 2020 18:20)  POCT Blood Glucose.: 156 mg/dL (24 Dec 2020 14:25)    I&O's Summary    24 Dec 2020 07:01  -  25 Dec 2020 07:00  --------------------------------------------------------  IN: 240 mL / OUT: 303 mL / NET: -63 mL    25 Dec 2020 07:01  -  25 Dec 2020 12:20  --------------------------------------------------------  IN: 240 mL / OUT: 0 mL / NET: 240 mL        PHYSICAL EXAM:  Vital Signs Last 24 Hrs  T(C): 37.2 (25 Dec 2020 09:40), Max: 37.2 (25 Dec 2020 09:40)  T(F): 98.9 (25 Dec 2020 09:40), Max: 98.9 (25 Dec 2020 09:40)  HR: 78 (25 Dec 2020 09:40) (78 - 98)  BP: 104/64 (25 Dec 2020 09:40) (102/65 - 119/76)  BP(mean): --  RR: 18 (25 Dec 2020 09:40) (17 - 20)  SpO2: 97% (25 Dec 2020 09:40) (93% - 97%)    CONSTITUTIONAL: NAD, well-developed, well-groomed  EYES: PERRLA; conjunctiva and sclera clear  RESPIRATORY: Normal respiratory effort; Mild crackle  to auscultation bilaterally. lt chest tube present. positive air entry rt>lt  CARDIOVASCULAR: Regular rate and rhythm, normal S1 and S2, no murmur   EXTS: + lower extremity edema; Peripheral pulses are 2+ bilaterally  ABDOMEN: Nontender to palpation, normoactive bowel sounds, no rebound/guarding;   MUSCLOSKELETAL:   no clubbing or cyanosis of digits; no joint swelling or tenderness to palpation  PSYCH: affect appropriate  NEUROLOGY: A+O to person, place, and time; CN 2-12 are intact and symmetric; no gross sensory deficits;     LABS:                        9.4    6.76  )-----------( 117      ( 25 Dec 2020 06:49 )             31.3     12-25    140  |  98  |  16.0  ----------------------------<  145<H>  3.1<L>   |  29.0  |  0.51    Ca    8.7      25 Dec 2020 06:49                Culture - Fungal, Body Fluid (collected 23 Dec 2020 05:22)  Source: .Body Fluid Pleural Fluid  Preliminary Report (23 Dec 2020 09:00):    Testing in progress    Culture - Body Fluid with Gram Stain (collected 23 Dec 2020 05:22)  Source: .Body Fluid Pleural Fluid  Gram Stain (23 Dec 2020 07:19):    polymorphonuclear leukocytes seen    No organisms seen    by cytocentrifuge  Preliminary Report (24 Dec 2020 08:04):    No growth        RADIOLOGY & ADDITIONAL TESTS:  Results Reviewed:   Imaging Personally Reviewed:  Electrocardiogram Personally Reviewed:    COORDINATION OF CARE:  Care Discussed with Consultants/Other Providers [Y/N]:  Prior or Outpatient Records Reviewed [Y/N]:

## 2020-12-25 NOTE — PROGRESS NOTE ADULT - SUBJECTIVE AND OBJECTIVE BOX
ERIKA ORTIZ  [id number]    Chief Complaint:  Acute CHF    Interval History:  Patient feels better but still with SOB with any movement.  Denies CP, palps.  Edema better but still significant    Tele:  No events          aspirin enteric coated 81 milliGRAM(s) Oral daily  atorvastatin 20 milliGRAM(s) Oral at bedtime  cholecalciferol 1000 Unit(s) Oral daily  dextrose 40% Gel 15 Gram(s) Oral once  dextrose 5%. 1000 milliLiter(s) IV Continuous <Continuous>  dextrose 5%. 1000 milliLiter(s) IV Continuous <Continuous>  dextrose 50% Injectable 25 Gram(s) IV Push once  dextrose 50% Injectable 12.5 Gram(s) IV Push once  dextrose 50% Injectable 25 Gram(s) IV Push once  furosemide   Injectable 40 milliGRAM(s) IV Push two times a day  glucagon  Injectable 1 milliGRAM(s) IntraMuscular once  heparin   Injectable 5000 Unit(s) SubCutaneous every 12 hours  influenza   Vaccine 0.5 milliLiter(s) IntraMuscular once  insulin lispro (ADMELOG) corrective regimen sliding scale   SubCutaneous three times a day before meals  lidocaine 1% (Preservative-free) Injectable 1 milliLiter(s) Local Injection once  lisinopril 2.5 milliGRAM(s) Oral daily  meclizine 12.5 milliGRAM(s) Oral three times a day  metoprolol succinate ER 50 milliGRAM(s) Oral daily  multivitamin 1 Tablet(s) Oral daily  oxybutynin 5 milliGRAM(s) Oral daily  pantoprazole    Tablet 40 milliGRAM(s) Oral before breakfast          Physical Exam:  T(C): 37.2 (12-25-20 @ 09:40), Max: 37.2 (12-25-20 @ 09:40)  HR: 78 (12-25-20 @ 09:40) (78 - 98)  BP: 104/64 (12-25-20 @ 09:40) (102/65 - 119/76)  RR: 18 (12-25-20 @ 09:40) (17 - 20)  SpO2: 97% (12-25-20 @ 09:40) (93% - 97%)  Wt(kg): --  General: Comfortable in NAD at 30 degrees  Neck: Mild JVD  CVS: nl s1s2, no s3  Pulm: Diminished with crackles at bases b/l  Abd: soft, non-tender  Ext: 2+ LE edema b/l to thighs  Neuro A&O x3  Psych: Normal affect      I&O's Summary    24 Dec 2020 07:01  -  25 Dec 2020 07:00  --------------------------------------------------------  IN: 240 mL / OUT: 303 mL / NET: -63 mL    25 Dec 2020 07:01  -  25 Dec 2020 12:53  --------------------------------------------------------  IN: 240 mL / OUT: 0 mL / NET: 240 mL          Labs:   25 Dec 2020 06:49    140    |  98     |  16.0   ----------------------------<  145    3.1     |  29.0   |  0.51     Ca    8.7        25 Dec 2020 06:49                            9.4    6.76  )-----------( 117      ( 25 Dec 2020 06:49 )             31.3         Echo personally reviewed:   1. Left ventricular ejection fraction, by visual estimation, is 30 to 35%.   2. Severely decreased global left ventricular systolic function.   3. Entire septum is akinetic.   4. Elevated left atrial and left ventricular end-diastolic pressures.   5. Severely increased LV wall thickness.   6. Spectral Doppler shows pseudonormal pattern of left ventricular myocardial filling (Grade II diastolic dysfunction).   7. Mild thickening and calcification of the anterior and posterior mitral valve leaflets.   8. Moderate mitral annular calcification.   9. A mobile echodensity on the mitral valve, ?mobile calcium, cannot r/o vegetation.  10. Moderate mitral valve regurgitation.  11. Estimated pulmonary artery systolic pressure is 58.1 mmHg assuming a right atrial pressure of 8 mmHg, which is consistent with moderate pulmonary hypertension.  12. Mild aortic regurgitation.  13. Peak transaortic gradient equals 95.5 mmHg, mean transaortic gradient equals 65.0 mmHg, the calculated aortic valve area equals 0.35 cm² by the continuity equation consistent with severe aortic stenosis.  14. Dilatation of the ascending aorta.  15. Endocardial visualization was enhanced with intravenous echo contrast.          Assessment:    80y Female with HTN, DM, aortic stenosis here with increasing dyspnea and leg swelling due to acute congestive heart failure (EF unknown). Also with large pleural effusion on the right s/p chest tube. Suspect all related to CHF but fluid results still pending.   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  Also with very severe AS.  Has at least mod MR, ?relationship to AS.  Small echodensity on MV may represent Ca, but need to r/o SBE.  No other evidence of SBE.  Will need JOE.  -Improving with diuresis.      Plan:  1. Plan JOE and LHC Monday/Tuesday.  JOE schedule for Monday morning, NPO post MN Sunday.  LHC likely after that.  2. Continue IV Lasix 40mg bid and monitor renal function and lytes  3. Continue low dose ACEI and beta blocker. Will consider transition to Entresto if BP stable.  4. ASA/statin  5. Daily weights, strict I/Os  6. 1.5 L fluid restriction  7. Further decisions will be based on JOE/LHC.         ERIKA ORTIZ  [id number]    Chief Complaint:  Acute CHF    Interval History:  Patient feels better but still with SOB with any movement.  Denies CP, palps.  Edema better but still significant  Awaiting JOE and cardiac cath    Tele:  No events      aspirin enteric coated 81 milliGRAM(s) Oral daily  atorvastatin 20 milliGRAM(s) Oral at bedtime  cholecalciferol 1000 Unit(s) Oral daily  dextrose 40% Gel 15 Gram(s) Oral once  dextrose 5%. 1000 milliLiter(s) IV Continuous <Continuous>  dextrose 5%. 1000 milliLiter(s) IV Continuous <Continuous>  dextrose 50% Injectable 25 Gram(s) IV Push once  dextrose 50% Injectable 12.5 Gram(s) IV Push once  dextrose 50% Injectable 25 Gram(s) IV Push once  furosemide   Injectable 40 milliGRAM(s) IV Push two times a day  glucagon  Injectable 1 milliGRAM(s) IntraMuscular once  heparin   Injectable 5000 Unit(s) SubCutaneous every 12 hours  influenza   Vaccine 0.5 milliLiter(s) IntraMuscular once  insulin lispro (ADMELOG) corrective regimen sliding scale   SubCutaneous three times a day before meals  lidocaine 1% (Preservative-free) Injectable 1 milliLiter(s) Local Injection once  lisinopril 2.5 milliGRAM(s) Oral daily  meclizine 12.5 milliGRAM(s) Oral three times a day  metoprolol succinate ER 50 milliGRAM(s) Oral daily  multivitamin 1 Tablet(s) Oral daily  oxybutynin 5 milliGRAM(s) Oral daily  pantoprazole    Tablet 40 milliGRAM(s) Oral before breakfast      Physical Exam:  T(C): 37.2 (12-25-20 @ 09:40), Max: 37.2 (12-25-20 @ 09:40)  HR: 78 (12-25-20 @ 09:40) (78 - 98)  BP: 104/64 (12-25-20 @ 09:40) (102/65 - 119/76)  RR: 18 (12-25-20 @ 09:40) (17 - 20)  SpO2: 97% (12-25-20 @ 09:40) (93% - 97%)  Wt(kg): --  General: Comfortable in NAD at 30 degrees  Neck: Mild JVD  CVS: nl s1s2, no s3  Pulm: Diminished with crackles at bases b/l  Abd: soft, non-tender  Ext: 2+ LE edema b/l to thighs  Neuro A&O x3  Psych: Normal affect      I&O's Summary    24 Dec 2020 07:01  -  25 Dec 2020 07:00  --------------------------------------------------------  IN: 240 mL / OUT: 303 mL / NET: -63 mL    25 Dec 2020 07:01  -  25 Dec 2020 12:53  --------------------------------------------------------  IN: 240 mL / OUT: 0 mL / NET: 240 mL          Labs:   25 Dec 2020 06:49    140    |  98     |  16.0   ----------------------------<  145    3.1     |  29.0   |  0.51     Ca    8.7        25 Dec 2020 06:49                            9.4    6.76  )-----------( 117      ( 25 Dec 2020 06:49 )             31.3         Echo :   1. Left ventricular ejection fraction, by visual estimation, is 30 to 35%.   2. Severely decreased global left ventricular systolic function.   3. Entire septum is akinetic.   4. Elevated left atrial and left ventricular end-diastolic pressures.   5. Severely increased LV wall thickness.   6. Spectral Doppler shows pseudonormal pattern of left ventricular myocardial filling (Grade II diastolic dysfunction).   7. Mild thickening and calcification of the anterior and posterior mitral valve leaflets.   8. Moderate mitral annular calcification.   9. A mobile echodensity on the mitral valve, ?mobile calcium, cannot r/o vegetation.  10. Moderate mitral valve regurgitation.  11. Estimated pulmonary artery systolic pressure is 58.1 mmHg assuming a right atrial pressure of 8 mmHg, which is consistent with moderate pulmonary hypertension.  12. Mild aortic regurgitation.  13. Peak transaortic gradient equals 95.5 mmHg, mean transaortic gradient equals 65.0 mmHg, the calculated aortic valve area equals 0.35 cm² by the continuity equation consistent with severe aortic stenosis.  14. Dilatation of the ascending aorta.  15. Endocardial visualization was enhanced with intravenous echo contrast.          Assessment:    80y Female with HTN, DM, Moderately severe LV systolic dysfunction, severe aortic stenosis, Mod - severe PAH here with increasing dyspnea and leg swelling due to acute congestive heart failure.   Also with large pleural effusion on the right s/p chest tube. 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  Also with very severe AS.  Has at least mod MR, ?relationship to AS.  Small echodensity on MV may represent Ca, but need to r/o SBE.  No other evidence of SBE.  Will need JOE.  -Improving with diuresis.      Plan:  1. Plan JOE and LHC Monday/Tuesday.  JOE schedule for Monday morning, NPO post MN Sunday.  LHC likely after that.  2. Continue IV Lasix 40mg bid and monitor renal function and lytes  3. Continue low dose ACEI and beta blocker. Will consider transition to Entresto if BP stable after cath .  4. ASA/statin  5. Daily weights, strict I/Os  6. 1.5 L fluid restriction  7. Further decisions will be based on JOE/LHC.

## 2020-12-26 DIAGNOSIS — K74.60 UNSPECIFIED CIRRHOSIS OF LIVER: ICD-10-CM

## 2020-12-26 DIAGNOSIS — I50.43 ACUTE ON CHRONIC COMBINED SYSTOLIC (CONGESTIVE) AND DIASTOLIC (CONGESTIVE) HEART FAILURE: ICD-10-CM

## 2020-12-26 LAB
ANION GAP SERPL CALC-SCNC: 11 MMOL/L — SIGNIFICANT CHANGE UP (ref 5–17)
BUN SERPL-MCNC: 20 MG/DL — SIGNIFICANT CHANGE UP (ref 8–20)
CALCIUM SERPL-MCNC: 8.8 MG/DL — SIGNIFICANT CHANGE UP (ref 8.6–10.2)
CHLORIDE SERPL-SCNC: 95 MMOL/L — LOW (ref 98–107)
CO2 SERPL-SCNC: 33 MMOL/L — HIGH (ref 22–29)
CREAT SERPL-MCNC: 0.63 MG/DL — SIGNIFICANT CHANGE UP (ref 0.5–1.3)
GLUCOSE BLDC GLUCOMTR-MCNC: 195 MG/DL — HIGH (ref 70–99)
GLUCOSE BLDC GLUCOMTR-MCNC: 204 MG/DL — HIGH (ref 70–99)
GLUCOSE BLDC GLUCOMTR-MCNC: 216 MG/DL — HIGH (ref 70–99)
GLUCOSE BLDC GLUCOMTR-MCNC: 271 MG/DL — HIGH (ref 70–99)
GLUCOSE SERPL-MCNC: 195 MG/DL — HIGH (ref 70–99)
HCT VFR BLD CALC: 33.2 % — LOW (ref 34.5–45)
HGB BLD-MCNC: 9.8 G/DL — LOW (ref 11.5–15.5)
MCHC RBC-ENTMCNC: 26.3 PG — LOW (ref 27–34)
MCHC RBC-ENTMCNC: 29.5 GM/DL — LOW (ref 32–36)
MCV RBC AUTO: 89.2 FL — SIGNIFICANT CHANGE UP (ref 80–100)
PLATELET # BLD AUTO: 120 K/UL — LOW (ref 150–400)
POTASSIUM SERPL-MCNC: 3.9 MMOL/L — SIGNIFICANT CHANGE UP (ref 3.5–5.3)
POTASSIUM SERPL-SCNC: 3.9 MMOL/L — SIGNIFICANT CHANGE UP (ref 3.5–5.3)
RBC # BLD: 3.72 M/UL — LOW (ref 3.8–5.2)
RBC # FLD: 18.5 % — HIGH (ref 10.3–14.5)
SODIUM SERPL-SCNC: 139 MMOL/L — SIGNIFICANT CHANGE UP (ref 135–145)
WBC # BLD: 6.56 K/UL — SIGNIFICANT CHANGE UP (ref 3.8–10.5)
WBC # FLD AUTO: 6.56 K/UL — SIGNIFICANT CHANGE UP (ref 3.8–10.5)

## 2020-12-26 PROCEDURE — 70450 CT HEAD/BRAIN W/O DYE: CPT | Mod: 26

## 2020-12-26 PROCEDURE — 71045 X-RAY EXAM CHEST 1 VIEW: CPT | Mod: 26

## 2020-12-26 PROCEDURE — 99232 SBSQ HOSP IP/OBS MODERATE 35: CPT

## 2020-12-26 PROCEDURE — 99233 SBSQ HOSP IP/OBS HIGH 50: CPT

## 2020-12-26 RX ORDER — LISINOPRIL 2.5 MG/1
2.5 TABLET ORAL DAILY
Refills: 0 | Status: DISCONTINUED | OUTPATIENT
Start: 2020-12-26 | End: 2021-01-01

## 2020-12-26 RX ORDER — MECLIZINE HCL 12.5 MG
12.5 TABLET ORAL THREE TIMES A DAY
Refills: 0 | Status: DISCONTINUED | OUTPATIENT
Start: 2020-12-26 | End: 2021-01-01

## 2020-12-26 RX ORDER — ACETAMINOPHEN 500 MG
650 TABLET ORAL EVERY 6 HOURS
Refills: 0 | Status: DISCONTINUED | OUTPATIENT
Start: 2020-12-26 | End: 2021-01-01

## 2020-12-26 RX ORDER — METOPROLOL TARTRATE 50 MG
25 TABLET ORAL DAILY
Refills: 0 | Status: DISCONTINUED | OUTPATIENT
Start: 2020-12-26 | End: 2021-01-01

## 2020-12-26 RX ADMIN — ATORVASTATIN CALCIUM 20 MILLIGRAM(S): 80 TABLET, FILM COATED ORAL at 21:19

## 2020-12-26 RX ADMIN — Medication 650 MILLIGRAM(S): at 10:58

## 2020-12-26 RX ADMIN — Medication 650 MILLIGRAM(S): at 10:28

## 2020-12-26 RX ADMIN — Medication 12.5 MILLIGRAM(S): at 06:30

## 2020-12-26 RX ADMIN — Medication 1: at 12:04

## 2020-12-26 RX ADMIN — Medication 650 MILLIGRAM(S): at 21:19

## 2020-12-26 RX ADMIN — Medication 81 MILLIGRAM(S): at 08:53

## 2020-12-26 RX ADMIN — Medication 40 MILLIGRAM(S): at 16:48

## 2020-12-26 RX ADMIN — Medication 1000 UNIT(S): at 08:53

## 2020-12-26 RX ADMIN — Medication 1 TABLET(S): at 08:53

## 2020-12-26 RX ADMIN — Medication 40 MILLIGRAM(S): at 06:26

## 2020-12-26 RX ADMIN — Medication 650 MILLIGRAM(S): at 21:45

## 2020-12-26 RX ADMIN — Medication 5 MILLIGRAM(S): at 08:54

## 2020-12-26 RX ADMIN — HEPARIN SODIUM 5000 UNIT(S): 5000 INJECTION INTRAVENOUS; SUBCUTANEOUS at 06:26

## 2020-12-26 RX ADMIN — Medication 2: at 16:59

## 2020-12-26 RX ADMIN — Medication 2: at 08:51

## 2020-12-26 RX ADMIN — HEPARIN SODIUM 5000 UNIT(S): 5000 INJECTION INTRAVENOUS; SUBCUTANEOUS at 16:49

## 2020-12-26 RX ADMIN — LISINOPRIL 2.5 MILLIGRAM(S): 2.5 TABLET ORAL at 16:49

## 2020-12-26 RX ADMIN — PANTOPRAZOLE SODIUM 40 MILLIGRAM(S): 20 TABLET, DELAYED RELEASE ORAL at 06:30

## 2020-12-26 NOTE — PROGRESS NOTE ADULT - PROBLEM SELECTOR PLAN 1
Pleural effusion likely hepatic or cardiac in nature  Remove pigtail today with cxr to follow up.  Patient not a candidate for PleurX per Dr. Iqbal.  Recommend Medical management /optimization of liver and heart disease at this time.  Thoracic surgery to sign off.  Please reconsult as necessary.  Discussed with Dr. Iqbal.

## 2020-12-26 NOTE — PROGRESS NOTE ADULT - ASSESSMENT
81yo F with pmh of chf, DM2, HTN, HLD presented to ED c/o worsening SOB, b/l LE swellings and orthopnea and cough for the past several days. Pt reports to sob even at rest and the swellings in her legs has been causing her difficulty with walking. She saw her PMD who told her that she has fluid in her lung, and sent her to ED. Denies cp, fever, chills, n/v. In the ED found to have elevated BNP 19690, CT chest with large right pleural effusion.     Acute decompensated systolic HF with large rt pleural effusion    s/p chest tube placement and subsequent removal    c/w IV lasix    liver cirrhosis with ascites: likely 2/2 heart disease    see above    Severe AS With hypodensity MV   plan for JOE,Cath Monday    Diabetes   -hold home po meds  -ISS, hypoglycemic protocol, FSG  -A1c 6.7    HTN/HLD  -cont with Lisinopril and metoprolol with holding parameter   -cont with atorvastatin 20mg daily     dizziness: may related to severe AS    check CT head, prn meclizine.    DVT ppx   -heparin    [facility] DW PT- PT WANTS TO BE FULL CODE INCLUDE INTUBATION, RESUSCITATION . Pt has friend Oliva but pt wants to make her own decision and does not want to involve her in care now.

## 2020-12-26 NOTE — PROGRESS NOTE ADULT - SUBJECTIVE AND OBJECTIVE BOX
seen for HF     complaining of dizziness on ambulation  improved LE and abdominal edema  ros negative otherwise     MEDICATIONS  (STANDING):  aspirin enteric coated 81 milliGRAM(s) Oral daily  atorvastatin 20 milliGRAM(s) Oral at bedtime  cholecalciferol 1000 Unit(s) Oral daily  dextrose 40% Gel 15 Gram(s) Oral once  dextrose 5%. 1000 milliLiter(s) (50 mL/Hr) IV Continuous <Continuous>  dextrose 5%. 1000 milliLiter(s) (100 mL/Hr) IV Continuous <Continuous>  dextrose 50% Injectable 25 Gram(s) IV Push once  dextrose 50% Injectable 12.5 Gram(s) IV Push once  dextrose 50% Injectable 25 Gram(s) IV Push once  furosemide   Injectable 40 milliGRAM(s) IV Push two times a day  glucagon  Injectable 1 milliGRAM(s) IntraMuscular once  heparin   Injectable 5000 Unit(s) SubCutaneous every 12 hours  influenza   Vaccine 0.5 milliLiter(s) IntraMuscular once  insulin lispro (ADMELOG) corrective regimen sliding scale   SubCutaneous three times a day before meals  lidocaine 1% (Preservative-free) Injectable 1 milliLiter(s) Local Injection once  lisinopril 2.5 milliGRAM(s) Oral daily  meclizine 12.5 milliGRAM(s) Oral three times a day  metoprolol succinate ER 25 milliGRAM(s) Oral daily  multivitamin 1 Tablet(s) Oral daily  oxybutynin 5 milliGRAM(s) Oral daily  pantoprazole    Tablet 40 milliGRAM(s) Oral before breakfast    MEDICATIONS  (PRN):  acetaminophen   Tablet .. 650 milliGRAM(s) Oral every 6 hours PRN Mild Pain (1 - 3)      Allergies    No Known Allergies      Vital Signs Last 24 Hrs  T(C): 36.4 (26 Dec 2020 09:45), Max: 37 (25 Dec 2020 21:36)  T(F): 97.6 (26 Dec 2020 09:45), Max: 98.6 (25 Dec 2020 21:36)  HR: 97 (26 Dec 2020 09:45) (70 - 97)  BP: 98/63 (26 Dec 2020 09:45) (95/62 - 113/72)  BP(mean): --  RR: 20 (26 Dec 2020 10:27) (16 - 20)  SpO2: 97% (26 Dec 2020 10:27) (94% - 98%)    PHYSICAL EXAM:    GENERAL: NAD  CHEST/LUNG: dec bs bibasilar   HEART: Regular rate and rhythm; S1 S2  ABDOMEN: Soft, mildly distended; Bowel sounds present  EXTREMITIES:  +1-2 edema b/l   NERVOUS SYSTEM:  Alert & Oriented X3, Motor Strength 5/5 B/L upper and lower extremities    nonfocal     LABS:                        9.8    6.56  )-----------( 120      ( 26 Dec 2020 06:37 )             33.2     12-26    139  |  95<L>  |  20.0  ----------------------------<  195<H>  3.9   |  33.0<H>  |  0.63    Ca    8.8      26 Dec 2020 06:37            CAPILLARY BLOOD GLUCOSE      POCT Blood Glucose.: 195 mg/dL (26 Dec 2020 11:59)  POCT Blood Glucose.: 204 mg/dL (26 Dec 2020 08:09)  POCT Blood Glucose.: 207 mg/dL (25 Dec 2020 21:59)  POCT Blood Glucose.: 205 mg/dL (25 Dec 2020 16:53)        RADIOLOGY & ADDITIONAL TESTS:

## 2020-12-26 NOTE — PROGRESS NOTE ADULT - SUBJECTIVE AND OBJECTIVE BOX
Subjective: Pt sitting up in bed.  Denies CP or SOB.  NAD noted.    Vital Signs:  Vital Signs Last 24 Hrs  T(C): 36.4 (12-26-20 @ 09:45), Max: 37 (12-25-20 @ 21:36)  T(F): 97.6 (12-26-20 @ 09:45), Max: 98.6 (12-25-20 @ 21:36)  HR: 97 (12-26-20 @ 09:45) (70 - 97)  BP: 98/63 (12-26-20 @ 09:45) (95/62 - 113/72)  RR: 20 (12-26-20 @ 10:27) (16 - 20)  SpO2: 97% (12-26-20 @ 10:27) (94% - 98%) on nasal O2.    Telemetry/Alarms: SR    Relevant labs, radiology and Medications reviewed    Neurology: A&Ox3.  Respiratory: Diminished BLL.  CV: RRR, +S1S2  Abdominal: Soft, NT, ND +BS.  Extremities: No edema, + pp.  Tubes:  Right pigtail to waterseal without crepitus or airleak noted.      12-25 @ 07:01  -  12-26 @ 07:00  --------------------------------------------------------  IN:    Oral Fluid: 240 mL  Total IN: 240 mL    OUT:    Voided (mL): 200 mL  Total OUT: 200 mL    Total NET: 40 mL

## 2020-12-26 NOTE — PROGRESS NOTE ADULT - ASSESSMENT
79yo F with pmh of chf, DM, HTN, HLD presented to ED c/o worsening SOB, b/l LE swellings and orthopnea and cough for the past several days. Pt reports to sob even at rest and the swellings in her legs has been causing her difficulty with walking. CT Chest with hepatic cirrhosis and ascites. She saw her PMD who told her that she has fluid in her lung, and sent her to ED. Found to have RT pleural effusion, s/p pigtail catheter insertion. 850cc serous fluid removed. Cultures NGTD. Cytology pending.

## 2020-12-26 NOTE — PROGRESS NOTE ADULT - SUBJECTIVE AND OBJECTIVE BOX
ERIKA ORTIZ  [id number]        Chief Complaint: follow up new onset CHF/AS/CMP      Subjective: feels a little better, mostly in bed      24 hour Tele: SR, no events        acetaminophen   Tablet .. 650 milliGRAM(s) Oral every 6 hours PRN  aspirin enteric coated 81 milliGRAM(s) Oral daily  atorvastatin 20 milliGRAM(s) Oral at bedtime  cholecalciferol 1000 Unit(s) Oral daily  dextrose 40% Gel 15 Gram(s) Oral once  dextrose 5%. 1000 milliLiter(s) IV Continuous <Continuous>  dextrose 5%. 1000 milliLiter(s) IV Continuous <Continuous>  dextrose 50% Injectable 25 Gram(s) IV Push once  dextrose 50% Injectable 12.5 Gram(s) IV Push once  dextrose 50% Injectable 25 Gram(s) IV Push once  furosemide   Injectable 40 milliGRAM(s) IV Push two times a day  glucagon  Injectable 1 milliGRAM(s) IntraMuscular once  heparin   Injectable 5000 Unit(s) SubCutaneous every 12 hours  influenza   Vaccine 0.5 milliLiter(s) IntraMuscular once  insulin lispro (ADMELOG) corrective regimen sliding scale   SubCutaneous three times a day before meals  lidocaine 1% (Preservative-free) Injectable 1 milliLiter(s) Local Injection once  lisinopril 2.5 milliGRAM(s) Oral daily  meclizine 12.5 milliGRAM(s) Oral three times a day  metoprolol succinate ER 50 milliGRAM(s) Oral daily  multivitamin 1 Tablet(s) Oral daily  oxybutynin 5 milliGRAM(s) Oral daily  pantoprazole    Tablet 40 milliGRAM(s) Oral before breakfast          Physical Exam:  T(C): 36.4 (12-26-20 @ 09:45), Max: 37 (12-25-20 @ 21:36)  HR: 97 (12-26-20 @ 09:45) (70 - 97)  BP: 98/63 (12-26-20 @ 09:45) (95/62 - 113/72)  RR: 20 (12-26-20 @ 10:27) (16 - 20)  SpO2: 97% (12-26-20 @ 10:27) (94% - 98%)  Wt(kg): --  General: Comfortable in NAD  HEENT: MMM, sclera anicteric  Resp: poor airmovement, diminished right base  CVS: nl s1 ansent s2, rrr, III/VI harsh systolic murmur, borderline JVD  Abd: soft, NT, ND  Ext: 1-2+ pitting edema bilaterally  Neuro A&O x3  Psych: Normal affect    I&O's Summary    25 Dec 2020 07:01  -  26 Dec 2020 07:00  --------------------------------------------------------  IN: 240 mL / OUT: 200 mL / NET: 40 mL          Labs:   26 Dec 2020 06:37    139    |  95     |  20.0   ----------------------------<  195    3.9     |  33.0   |  0.63     Ca    8.8        26 Dec 2020 06:37                            9.8    6.56  )-----------( 120      ( 26 Dec 2020 06:37 )             33.2               Echo :   1. Left ventricular ejection fraction, by visual estimation, is 30 to 35%.   2. Severely decreased global left ventricular systolic function.   3. Entire septum is akinetic.   4. Elevated left atrial and left ventricular end-diastolic pressures.   5. Severely increased LV wall thickness.   6. Spectral Doppler shows pseudonormal pattern of left ventricular myocardial filling (Grade II diastolic dysfunction).   7. Mild thickening and calcification of the anterior and posterior mitral valve leaflets.   8. Moderate mitral annular calcification.   9. A mobile echodensity on the mitral valve, ?mobile calcium, cannot r/o vegetation.  10. Moderate mitral valve regurgitation.  11. Estimated pulmonary artery systolic pressure is 58.1 mmHg assuming a right atrial pressure of 8 mmHg, which is consistent with moderate pulmonary hypertension.  12. Mild aortic regurgitation.  13. Peak transaortic gradient equals 95.5 mmHg, mean transaortic gradient equals 65.0 mmHg, the calculated aortic valve area equals 0.35 cm² by the continuity equation consistent with severe aortic stenosis.  14. Dilatation of the ascending aorta.  15. Endocardial visualization was enhanced with intravenous echo contrast.          Assessment:    80y Female with HTN, DM, Moderately severe LV systolic dysfunction, severe aortic stenosis, Mod - severe PAH here with increasing dyspnea and leg swelling due to acute congestive heart failure.   Also with large pleural effusion on the right s/p chest tube. Suspect all related to CHF.  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  Also with very severe AS.  MR is moderate to severe, significant fibrocalcific disease of the valve, ? some maybe functional due to CMP as well as severe AS  -Small echodensity on MV and suspect represents Calcification, but need to r/o SBE.  No other evidence of SBE.  JOE planned  -Improving with diuresis.  BP remains on low side    Plan:  1. Plan JOE and LHC Monday/Tuesday.  JOE schedule for Monday morning, NPO post MN Sunday.  LHC likely after that.  2. Continue IV Lasix 40mg bid and monitor renal function and lytes. Continues to tolerate, will benefit from further diuresis as tolerated  3. Continue low dose ACEI and beta blocker. Will consider transition to Entresto if BP stable after cath. Lower metoprolol to 25mg daily with parameters. Currently not receiving due to low  BP  4. ASA/statin  5. Daily weights, strict I/Os  6. 1.5 L fluid restriction  7. Further decisions will be based on JOE/LHC.        Cheng Manley MD

## 2020-12-27 LAB
ANION GAP SERPL CALC-SCNC: 14 MMOL/L — SIGNIFICANT CHANGE UP (ref 5–17)
BUN SERPL-MCNC: 17 MG/DL — SIGNIFICANT CHANGE UP (ref 8–20)
CALCIUM SERPL-MCNC: 9.3 MG/DL — SIGNIFICANT CHANGE UP (ref 8.6–10.2)
CHLORIDE SERPL-SCNC: 92 MMOL/L — LOW (ref 98–107)
CO2 SERPL-SCNC: 32 MMOL/L — HIGH (ref 22–29)
CREAT SERPL-MCNC: 0.47 MG/DL — LOW (ref 0.5–1.3)
GLUCOSE BLDC GLUCOMTR-MCNC: 186 MG/DL — HIGH (ref 70–99)
GLUCOSE BLDC GLUCOMTR-MCNC: 192 MG/DL — HIGH (ref 70–99)
GLUCOSE BLDC GLUCOMTR-MCNC: 206 MG/DL — HIGH (ref 70–99)
GLUCOSE BLDC GLUCOMTR-MCNC: 323 MG/DL — HIGH (ref 70–99)
GLUCOSE SERPL-MCNC: 164 MG/DL — HIGH (ref 70–99)
HCT VFR BLD CALC: 35.9 % — SIGNIFICANT CHANGE UP (ref 34.5–45)
HGB BLD-MCNC: 10.7 G/DL — LOW (ref 11.5–15.5)
MAGNESIUM SERPL-MCNC: 1.5 MG/DL — LOW (ref 1.6–2.6)
MCHC RBC-ENTMCNC: 26.4 PG — LOW (ref 27–34)
MCHC RBC-ENTMCNC: 29.8 GM/DL — LOW (ref 32–36)
MCV RBC AUTO: 88.4 FL — SIGNIFICANT CHANGE UP (ref 80–100)
PHOSPHATE SERPL-MCNC: 3.3 MG/DL — SIGNIFICANT CHANGE UP (ref 2.4–4.7)
PLATELET # BLD AUTO: 116 K/UL — LOW (ref 150–400)
POTASSIUM SERPL-MCNC: 3.8 MMOL/L — SIGNIFICANT CHANGE UP (ref 3.5–5.3)
POTASSIUM SERPL-SCNC: 3.8 MMOL/L — SIGNIFICANT CHANGE UP (ref 3.5–5.3)
RBC # BLD: 4.06 M/UL — SIGNIFICANT CHANGE UP (ref 3.8–5.2)
RBC # FLD: 18.3 % — HIGH (ref 10.3–14.5)
SODIUM SERPL-SCNC: 138 MMOL/L — SIGNIFICANT CHANGE UP (ref 135–145)
WBC # BLD: 6.89 K/UL — SIGNIFICANT CHANGE UP (ref 3.8–10.5)
WBC # FLD AUTO: 6.89 K/UL — SIGNIFICANT CHANGE UP (ref 3.8–10.5)

## 2020-12-27 PROCEDURE — 99232 SBSQ HOSP IP/OBS MODERATE 35: CPT

## 2020-12-27 PROCEDURE — 99233 SBSQ HOSP IP/OBS HIGH 50: CPT

## 2020-12-27 RX ORDER — INSULIN LISPRO 100/ML
2 VIAL (ML) SUBCUTANEOUS ONCE
Refills: 0 | Status: COMPLETED | OUTPATIENT
Start: 2020-12-27 | End: 2020-12-27

## 2020-12-27 RX ORDER — MAGNESIUM SULFATE 500 MG/ML
2 VIAL (ML) INJECTION ONCE
Refills: 0 | Status: COMPLETED | OUTPATIENT
Start: 2020-12-27 | End: 2020-12-27

## 2020-12-27 RX ADMIN — Medication 5 MILLIGRAM(S): at 12:30

## 2020-12-27 RX ADMIN — Medication 40 MILLIGRAM(S): at 05:22

## 2020-12-27 RX ADMIN — HEPARIN SODIUM 5000 UNIT(S): 5000 INJECTION INTRAVENOUS; SUBCUTANEOUS at 05:15

## 2020-12-27 RX ADMIN — Medication 1: at 08:19

## 2020-12-27 RX ADMIN — Medication 50 GRAM(S): at 12:29

## 2020-12-27 RX ADMIN — Medication 1 TABLET(S): at 12:30

## 2020-12-27 RX ADMIN — LISINOPRIL 2.5 MILLIGRAM(S): 2.5 TABLET ORAL at 05:21

## 2020-12-27 RX ADMIN — Medication 81 MILLIGRAM(S): at 12:30

## 2020-12-27 RX ADMIN — Medication 25 MILLIGRAM(S): at 05:15

## 2020-12-27 RX ADMIN — HEPARIN SODIUM 5000 UNIT(S): 5000 INJECTION INTRAVENOUS; SUBCUTANEOUS at 17:44

## 2020-12-27 RX ADMIN — ATORVASTATIN CALCIUM 20 MILLIGRAM(S): 80 TABLET, FILM COATED ORAL at 21:02

## 2020-12-27 RX ADMIN — Medication 2 UNIT(S): at 21:49

## 2020-12-27 RX ADMIN — PANTOPRAZOLE SODIUM 40 MILLIGRAM(S): 20 TABLET, DELAYED RELEASE ORAL at 05:15

## 2020-12-27 RX ADMIN — Medication 1000 UNIT(S): at 12:29

## 2020-12-27 RX ADMIN — Medication 1: at 17:42

## 2020-12-27 RX ADMIN — Medication 2: at 12:30

## 2020-12-27 NOTE — PROGRESS NOTE ADULT - SUBJECTIVE AND OBJECTIVE BOX
ERIKA ORTIZ  [id number]      Chief Complaint: follow up new onset CHF/AS/CMP      Subjective: feels a little better, mostly in bed      24 hour Tele: SR, no events      acetaminophen   Tablet .. 650 milliGRAM(s) Oral every 6 hours PRN  aspirin enteric coated 81 milliGRAM(s) Oral daily  atorvastatin 20 milliGRAM(s) Oral at bedtime  cholecalciferol 1000 Unit(s) Oral daily  dextrose 40% Gel 15 Gram(s) Oral once  dextrose 5%. 1000 milliLiter(s) IV Continuous <Continuous>  dextrose 5%. 1000 milliLiter(s) IV Continuous <Continuous>  dextrose 50% Injectable 25 Gram(s) IV Push once  dextrose 50% Injectable 12.5 Gram(s) IV Push once  dextrose 50% Injectable 25 Gram(s) IV Push once  furosemide   Injectable 40 milliGRAM(s) IV Push two times a day  glucagon  Injectable 1 milliGRAM(s) IntraMuscular once  heparin   Injectable 5000 Unit(s) SubCutaneous every 12 hours  influenza   Vaccine 0.5 milliLiter(s) IntraMuscular once  insulin lispro (ADMELOG) corrective regimen sliding scale   SubCutaneous three times a day before meals  lidocaine 1% (Preservative-free) Injectable 1 milliLiter(s) Local Injection once  lisinopril 2.5 milliGRAM(s) Oral daily  magnesium sulfate  IVPB 2 Gram(s) IV Intermittent once  meclizine 12.5 milliGRAM(s) Oral three times a day PRN  metoprolol succinate ER 25 milliGRAM(s) Oral daily  multivitamin 1 Tablet(s) Oral daily  oxybutynin 5 milliGRAM(s) Oral daily  pantoprazole    Tablet 40 milliGRAM(s) Oral before breakfast          Physical Exam:  T(C): 36.9 (12-27-20 @ 08:24), Max: 37.2 (12-27-20 @ 05:12)  HR: 77 (12-27-20 @ 08:24) (76 - 80)  BP: 107/69 (12-27-20 @ 08:24) (102/68 - 111/71)  RR: 18 (12-27-20 @ 08:24) (18 - 20)  SpO2: 100% (12-27-20 @ 08:24) (94% - 100%)  Wt(kg): --  General: Comfortable in NAD  HEENT: MMM, sclera anicteric  Resp: poor air movement, diminished right base  CVS: nl s1 diminished s2, rrr, III/VI harsh systolic murmur, borderline JVD  Abd: soft, NT, ND  Ext: 1-2+ pitting edema bilaterally  Neuro A&O x3  Psych: Normal affect      I&O's Summary    26 Dec 2020 07:01  -  27 Dec 2020 07:00  --------------------------------------------------------  IN: 480 mL / OUT: 3800 mL / NET: -3320 mL          Labs:   27 Dec 2020 11:04    138    |  92     |  17.0   ----------------------------<  164    3.8     |  32.0   |  0.47     Ca    9.3        27 Dec 2020 11:04  Phos  3.3       27 Dec 2020 11:04  Mg     1.5       27 Dec 2020 11:04                            10.7   6.89  )-----------( 116      ( 27 Dec 2020 11:04 )             35.9     Echo :   1. Left ventricular ejection fraction, by visual estimation, is 30 to 35%.   2. Severely decreased global left ventricular systolic function.   3. Entire septum is akinetic.   4. Elevated left atrial and left ventricular end-diastolic pressures.   5. Severely increased LV wall thickness.   6. Spectral Doppler shows pseudonormal pattern of left ventricular myocardial filling (Grade II diastolic dysfunction).   7. Mild thickening and calcification of the anterior and posterior mitral valve leaflets.   8. Moderate mitral annular calcification.   9. A mobile echodensity on the mitral valve, ?mobile calcium, cannot r/o vegetation.  10. Moderate mitral valve regurgitation.  11. Estimated pulmonary artery systolic pressure is 58.1 mmHg assuming a right atrial pressure of 8 mmHg, which is consistent with moderate pulmonary hypertension.  12. Mild aortic regurgitation.  13. Peak transaortic gradient equals 95.5 mmHg, mean transaortic gradient equals 65.0 mmHg, the calculated aortic valve area equals 0.35 cm² by th  aortic valve area equals 0.35 cm² by the continuity equation consistent with severe aortic stenosis.  14. Dilatation of the ascending aorta.  15. Endocardial visualization was enhanced with intravenous echo contrast.          Assessment:    80y Female with HTN, DM, Moderately severe LV systolic dysfunction, severe aortic stenosis, Mod - severe PAH here with increasing dyspnea and leg swelling due to acute congestive heart failure.   Also with large pleural effusion on the right s/p chest tube. Suspect all related to CHF.  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  Also with very severe AS.  MR is moderate to severe, significant fibrocalcific disease of the valve, ? some maybe functional due to CMP as well as severe AS  -Small echodensity on MV and suspect represents Calcification, but need to r/o SBE.  No other evidence of SBE.  JOE planned  -Improving with diuresis.  BP remains on low side    Plan:  1. Plan JOE and LHC Monday/Tuesday.  JOE schedule for Monday morning, NPO post MN Sunday.  LHC likely after that.  2. Continue IV Lasix 40mg bid and monitor renal function and lytes. Continues to tolerate, will benefit from further diuresis as tolerated  3. Continue low dose ACEI and beta blocker. Will consider transition to Entresto if BP stable after cath. Lower metoprolol to 25mg daily with parameters. Currently not receiving due to low  BP  4. ASA/statin  5. Daily weights, strict I/Os  6. 1.5 L fluid restriction  7. Further decisions will be based on JOE/LHC.         ERIKA ORTIZ  [id number]      Chief Complaint: follow up new onset CHF/AS/CMP      Subjective: feels a little better, mostly in bed  Awaiting her JOE and cardiac cath    24 hour Tele: SR, no events      acetaminophen   Tablet .. 650 milliGRAM(s) Oral every 6 hours PRN  aspirin enteric coated 81 milliGRAM(s) Oral daily  atorvastatin 20 milliGRAM(s) Oral at bedtime  cholecalciferol 1000 Unit(s) Oral daily  dextrose 40% Gel 15 Gram(s) Oral once  dextrose 5%. 1000 milliLiter(s) IV Continuous <Continuous>  dextrose 5%. 1000 milliLiter(s) IV Continuous <Continuous>  dextrose 50% Injectable 25 Gram(s) IV Push once  dextrose 50% Injectable 12.5 Gram(s) IV Push once  dextrose 50% Injectable 25 Gram(s) IV Push once  furosemide   Injectable 40 milliGRAM(s) IV Push two times a day  glucagon  Injectable 1 milliGRAM(s) IntraMuscular once  heparin   Injectable 5000 Unit(s) SubCutaneous every 12 hours  influenza   Vaccine 0.5 milliLiter(s) IntraMuscular once  insulin lispro (ADMELOG) corrective regimen sliding scale   SubCutaneous three times a day before meals  lidocaine 1% (Preservative-free) Injectable 1 milliLiter(s) Local Injection once  lisinopril 2.5 milliGRAM(s) Oral daily  magnesium sulfate  IVPB 2 Gram(s) IV Intermittent once  meclizine 12.5 milliGRAM(s) Oral three times a day PRN  metoprolol succinate ER 25 milliGRAM(s) Oral daily  multivitamin 1 Tablet(s) Oral daily  oxybutynin 5 milliGRAM(s) Oral daily  pantoprazole    Tablet 40 milliGRAM(s) Oral before breakfast          Physical Exam:  T(C): 36.9 (12-27-20 @ 08:24), Max: 37.2 (12-27-20 @ 05:12)  HR: 77 (12-27-20 @ 08:24) (76 - 80)  BP: 107/69 (12-27-20 @ 08:24) (102/68 - 111/71)  RR: 18 (12-27-20 @ 08:24) (18 - 20)  SpO2: 100% (12-27-20 @ 08:24) (94% - 100%)  Wt(kg): --  General: Comfortable in NAD  HEENT: MMM, sclera anicteric  Resp: poor air movement, diminished right base, crackles bilaterally  CVS: nl s1 diminished s2, rrr, III/VI harsh systolic murmur, borderline JVD  Abd: soft, NT, ND  Ext: 2+ pitting edema bilaterally  Neuro A&O x3  Psych: Normal affect      I&O's Summary    26 Dec 2020 07:01  -  27 Dec 2020 07:00  --------------------------------------------------------  IN: 480 mL / OUT: 3800 mL / NET: -3320 mL          Labs:   27 Dec 2020 11:04    138    |  92     |  17.0   ----------------------------<  164    3.8     |  32.0   |  0.47     Ca    9.3        27 Dec 2020 11:04  Phos  3.3       27 Dec 2020 11:04  Mg     1.5       27 Dec 2020 11:04                            10.7   6.89  )-----------( 116      ( 27 Dec 2020 11:04 )             35.9     Echo :   1. Left ventricular ejection fraction, by visual estimation, is 30 to 35%.   2. Severely decreased global left ventricular systolic function.   3. Entire septum is akinetic.   4. Elevated left atrial and left ventricular end-diastolic pressures.   5. Severely increased LV wall thickness.   6. Spectral Doppler shows pseudonormal pattern of left ventricular myocardial filling (Grade II diastolic dysfunction).   7. Mild thickening and calcification of the anterior and posterior mitral valve leaflets.   8. Moderate mitral annular calcification.   9. A mobile echodensity on the mitral valve, ?mobile calcium, cannot r/o vegetation.  10. Moderate mitral valve regurgitation.  11. Estimated pulmonary artery systolic pressure is 58.1 mmHg assuming a right atrial pressure of 8 mmHg, which is consistent with moderate pulmonary hypertension.  12. Mild aortic regurgitation.  13. Peak transaortic gradient equals 95.5 mmHg, mean transaortic gradient equals 65.0 mmHg, the calculated aortic valve area equals 0.35 cm² by th  aortic valve area equals 0.35 cm² by the continuity equation consistent with severe aortic stenosis.  14. Dilatation of the ascending aorta.  15. Endocardial visualization was enhanced with intravenous echo contrast.          Assessment:    80y Female with HTN, DM, Moderately severe LV systolic dysfunction, severe aortic stenosis, Mod - severe PAH here with increasing dyspnea and leg swelling due to acute congestive heart failure.   Also with large pleural effusion on the right s/p chest tube. Suspect all related to CHF.  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  Also with very severe AS.  MR is moderate to severe, significant fibrocalcific disease of the valve, ? some maybe functional due to CMP as well as severe AS  -Small echodensity on MV and suspect represents Calcification, but need to r/o SBE.  No other evidence of SBE.  JOE planned  -Improving with diuresis.  BP remains on low side    Plan:  1. Plan JOE and LHC Monday/Tuesday.  JOE schedule for Monday morning, NPO post MN Sunday.  LHC likely after that.  2. Continue IV Lasix 40 mg bid and monitor renal function and lytes. Continues to tolerate, will benefit from further diuresis as tolerated  3. Continue low dose ACEI and beta blocker. Will consider transition to Entresto if BP stable after cath. Lower metoprolol to 25mg daily with parameters. Currently not receiving due to low  BP  4. ASA/statin  5. Daily weights, strict I/Os  6. 1.5 L fluid restriction  7. Further decisions will be based on JOE/LHC.

## 2020-12-27 NOTE — PROGRESS NOTE ADULT - SUBJECTIVE AND OBJECTIVE BOX
seen for HF, as    complaining of generalized aches/pains  ros otherwise negative     MEDICATIONS  (STANDING):  aspirin enteric coated 81 milliGRAM(s) Oral daily  atorvastatin 20 milliGRAM(s) Oral at bedtime  cholecalciferol 1000 Unit(s) Oral daily  dextrose 40% Gel 15 Gram(s) Oral once  dextrose 5%. 1000 milliLiter(s) (50 mL/Hr) IV Continuous <Continuous>  dextrose 5%. 1000 milliLiter(s) (100 mL/Hr) IV Continuous <Continuous>  dextrose 50% Injectable 25 Gram(s) IV Push once  dextrose 50% Injectable 12.5 Gram(s) IV Push once  dextrose 50% Injectable 25 Gram(s) IV Push once  furosemide   Injectable 40 milliGRAM(s) IV Push two times a day  glucagon  Injectable 1 milliGRAM(s) IntraMuscular once  heparin   Injectable 5000 Unit(s) SubCutaneous every 12 hours  influenza   Vaccine 0.5 milliLiter(s) IntraMuscular once  insulin lispro (ADMELOG) corrective regimen sliding scale   SubCutaneous three times a day before meals  lidocaine 1% (Preservative-free) Injectable 1 milliLiter(s) Local Injection once  lisinopril 2.5 milliGRAM(s) Oral daily  metoprolol succinate ER 25 milliGRAM(s) Oral daily  multivitamin 1 Tablet(s) Oral daily  oxybutynin 5 milliGRAM(s) Oral daily  pantoprazole    Tablet 40 milliGRAM(s) Oral before breakfast    MEDICATIONS  (PRN):  acetaminophen   Tablet .. 650 milliGRAM(s) Oral every 6 hours PRN Mild Pain (1 - 3)  meclizine 12.5 milliGRAM(s) Oral three times a day PRN Dizziness      Allergies    No Known Allergies        Vital Signs Last 24 Hrs  T(C): 36.9 (27 Dec 2020 08:24), Max: 37.2 (27 Dec 2020 05:12)  T(F): 98.5 (27 Dec 2020 08:24), Max: 98.9 (27 Dec 2020 05:12)  HR: 77 (27 Dec 2020 08:24) (76 - 80)  BP: 107/69 (27 Dec 2020 08:24) (102/68 - 111/71)  BP(mean): 85 (27 Dec 2020 05:12) (85 - 85)  RR: 18 (27 Dec 2020 08:24) (18 - 20)  SpO2: 100% (27 Dec 2020 08:24) (94% - 100%)    PHYSICAL EXAM:    GENERAL: NAD  CHEST/LUNG: Clear to percussion bilaterally  HEART: Regular rate and rhythm; S1 S2  ABDOMEN: Soft, Nontender, Bowel sounds present  EXTREMITIES: =1 edema lower legs   NERVOUS SYSTEM:  Alert & Oriented X3, Motor Strength 5/5 B/L upper and lower extremities      LABS:                        10.7   6.89  )-----------( 116      ( 27 Dec 2020 11:04 )             35.9     12-26    139  |  95<L>  |  20.0  ----------------------------<  195<H>  3.9   |  33.0<H>  |  0.63    Ca    8.8      26 Dec 2020 06:37            CAPILLARY BLOOD GLUCOSE      POCT Blood Glucose.: 192 mg/dL (27 Dec 2020 08:13)  POCT Blood Glucose.: 271 mg/dL (26 Dec 2020 21:20)  POCT Blood Glucose.: 216 mg/dL (26 Dec 2020 16:58)  POCT Blood Glucose.: 195 mg/dL (26 Dec 2020 11:59)        RADIOLOGY & ADDITIONAL TESTS:

## 2020-12-27 NOTE — PROGRESS NOTE ADULT - ASSESSMENT
81yo F with pmh of chf, DM2, HTN, HLD presented to ED c/o worsening SOB, b/l LE swellings and orthopnea and cough for the past several days. Pt reports to sob even at rest and the swellings in her legs has been causing her difficulty with walking. She saw her PMD who told her that she has fluid in her lung, and sent her to ED. Denies cp, fever, chills, n/v. In the ED found to have elevated BNP 19690, CT chest with large right pleural effusion.     Acute decompensated systolic HF with large rt pleural effusion    s/p chest tube placement and subsequent removal    c/w IV lasix    liver cirrhosis with ascites: likely 2/2 heart disease    see above    Severe AS With hypodensity MV   plan for JOE,Cath Monday    Diabetes   -hold home po meds  -ISS, hypoglycemic protocol, FSG  -A1c 6.7    HTN/HLD  -cont with Lisinopril and metoprolol with holding parameter   -cont with atorvastatin 20mg daily     dizziness: may related to severe AS    negative CT head, prn meclizine.    DVT ppx   -heparin    [facility] DW PT- PT WANTS TO BE FULL CODE INCLUDE INTUBATION, RESUSCITATION . Pt has friend Oliva but pt wants to make her own decision and does not want to involve her in care now.

## 2020-12-28 LAB
ANION GAP SERPL CALC-SCNC: 13 MMOL/L — SIGNIFICANT CHANGE UP (ref 5–17)
BUN SERPL-MCNC: 23 MG/DL — HIGH (ref 8–20)
CALCIUM SERPL-MCNC: 9.1 MG/DL — SIGNIFICANT CHANGE UP (ref 8.6–10.2)
CHLORIDE SERPL-SCNC: 91 MMOL/L — LOW (ref 98–107)
CO2 SERPL-SCNC: 31 MMOL/L — HIGH (ref 22–29)
CREAT SERPL-MCNC: 0.53 MG/DL — SIGNIFICANT CHANGE UP (ref 0.5–1.3)
CULTURE RESULTS: SIGNIFICANT CHANGE UP
GLUCOSE BLDC GLUCOMTR-MCNC: 197 MG/DL — HIGH (ref 70–99)
GLUCOSE BLDC GLUCOMTR-MCNC: 225 MG/DL — HIGH (ref 70–99)
GLUCOSE BLDC GLUCOMTR-MCNC: 246 MG/DL — HIGH (ref 70–99)
GLUCOSE BLDC GLUCOMTR-MCNC: 281 MG/DL — HIGH (ref 70–99)
GLUCOSE BLDC GLUCOMTR-MCNC: 290 MG/DL — HIGH (ref 70–99)
GLUCOSE SERPL-MCNC: 213 MG/DL — HIGH (ref 70–99)
HCT VFR BLD CALC: 33.4 % — LOW (ref 34.5–45)
HGB BLD-MCNC: 9.8 G/DL — LOW (ref 11.5–15.5)
MAGNESIUM SERPL-MCNC: 1.6 MG/DL — SIGNIFICANT CHANGE UP (ref 1.6–2.6)
MCHC RBC-ENTMCNC: 25.9 PG — LOW (ref 27–34)
MCHC RBC-ENTMCNC: 29.3 GM/DL — LOW (ref 32–36)
MCV RBC AUTO: 88.1 FL — SIGNIFICANT CHANGE UP (ref 80–100)
NON-GYNECOLOGICAL CYTOLOGY STUDY: SIGNIFICANT CHANGE UP
PLATELET # BLD AUTO: 136 K/UL — LOW (ref 150–400)
POTASSIUM SERPL-MCNC: 3.6 MMOL/L — SIGNIFICANT CHANGE UP (ref 3.5–5.3)
POTASSIUM SERPL-SCNC: 3.6 MMOL/L — SIGNIFICANT CHANGE UP (ref 3.5–5.3)
RBC # BLD: 3.79 M/UL — LOW (ref 3.8–5.2)
RBC # FLD: 18 % — HIGH (ref 10.3–14.5)
SODIUM SERPL-SCNC: 135 MMOL/L — SIGNIFICANT CHANGE UP (ref 135–145)
SPECIMEN SOURCE: SIGNIFICANT CHANGE UP
WBC # BLD: 6.92 K/UL — SIGNIFICANT CHANGE UP (ref 3.8–10.5)
WBC # FLD AUTO: 6.92 K/UL — SIGNIFICANT CHANGE UP (ref 3.8–10.5)

## 2020-12-28 PROCEDURE — 99232 SBSQ HOSP IP/OBS MODERATE 35: CPT

## 2020-12-28 RX ORDER — HYDROCORTISONE 1 %
1 OINTMENT (GRAM) TOPICAL DAILY
Refills: 0 | Status: DISCONTINUED | OUTPATIENT
Start: 2020-12-28 | End: 2021-01-01

## 2020-12-28 RX ORDER — MAGNESIUM SULFATE 500 MG/ML
2 VIAL (ML) INJECTION ONCE
Refills: 0 | Status: COMPLETED | OUTPATIENT
Start: 2020-12-28 | End: 2020-12-28

## 2020-12-28 RX ORDER — BENZOCAINE AND MENTHOL 5; 1 G/100ML; G/100ML
1 LIQUID ORAL
Refills: 0 | Status: DISCONTINUED | OUTPATIENT
Start: 2020-12-28 | End: 2021-01-01

## 2020-12-28 RX ADMIN — Medication 40 MILLIGRAM(S): at 17:32

## 2020-12-28 RX ADMIN — Medication 5 MILLIGRAM(S): at 12:35

## 2020-12-28 RX ADMIN — HEPARIN SODIUM 5000 UNIT(S): 5000 INJECTION INTRAVENOUS; SUBCUTANEOUS at 17:34

## 2020-12-28 RX ADMIN — Medication 1000 UNIT(S): at 12:35

## 2020-12-28 RX ADMIN — Medication 2: at 17:32

## 2020-12-28 RX ADMIN — Medication 25 MILLIGRAM(S): at 06:02

## 2020-12-28 RX ADMIN — Medication 40 MILLIGRAM(S): at 06:02

## 2020-12-28 RX ADMIN — Medication 1 TABLET(S): at 12:35

## 2020-12-28 RX ADMIN — Medication 81 MILLIGRAM(S): at 12:35

## 2020-12-28 RX ADMIN — PANTOPRAZOLE SODIUM 40 MILLIGRAM(S): 20 TABLET, DELAYED RELEASE ORAL at 06:02

## 2020-12-28 RX ADMIN — Medication 50 GRAM(S): at 08:53

## 2020-12-28 RX ADMIN — LISINOPRIL 2.5 MILLIGRAM(S): 2.5 TABLET ORAL at 06:02

## 2020-12-28 RX ADMIN — Medication 1 SUPPOSITORY(S): at 21:21

## 2020-12-28 RX ADMIN — Medication 2: at 12:36

## 2020-12-28 RX ADMIN — BENZOCAINE AND MENTHOL 1 LOZENGE: 5; 1 LIQUID ORAL at 16:51

## 2020-12-28 RX ADMIN — ATORVASTATIN CALCIUM 20 MILLIGRAM(S): 80 TABLET, FILM COATED ORAL at 21:21

## 2020-12-28 NOTE — PROGRESS NOTE ADULT - ASSESSMENT
Assessment:  Radha Zhang is an 80 year old woman with past medical history of Hypertension, Diabetes mellitus, HFrEF, Severe aortic stenosis and moderate-to-severe pulmonary hypertension who presents with increasing dyspnea and leg swelling found to have acute on chronic heart failure exacerbation, also with large right pleural effusion now s/p right chest tube.    TTE with moderate LV dysfunction and wall motion abnormalities which may suggest multi-vessel CAD, also with severe aortic stenosis and moderate to severe mitral regurgitation and calcification of mitral valve.       Recommendations:   [] Plan for JOE today to rule out any underlying mitral valve endocarditis   [] Plan for LHC afterwards   [] S/p several days of IV Lasix 40 mg BID   [] Possible transition to Entresto if BP stable after cath   [] On metoprolol, asa, statin  Assessment:  Radha Zhang is an 80 year old woman with past medical history of Hypertension, Diabetes mellitus, HFrEF (LVEF 35-40% in 12/2020), Severe aortic stenosis and moderate-to-severe pulmonary hypertension who presents with increasing dyspnea and leg swelling found to have acute on chronic heart failure exacerbation, also with large right pleural effusion now s/p right chest tube.    TTE with moderate LV dysfunction and wall motion abnormalities which may suggest multi-vessel CAD, also with severe aortic stenosis and moderate to severe mitral regurgitation and calcification of mitral valve.       Recommendations:   [] HFrEF: Patient appears volume overloaded today still despite several days of IV diuretics. Plan for repeat pro BNP tomorrow. Will continue Lasix 40 mg IVP BID today, renal function stable. Will plan for left and right heart catherization tomorrow, please keep NPO after MN tonight. Continue guideline directed medical therapy: metoprolol succinate 25 mg daily, lisinopril 2.5 mg daily (can consider transition to Entresto as outpatient).   [] Moderate to severe mitral regurgitation: JOE with no obvious valvular vegetation visualized. This may be functional in etiology and due to significant mitral fibrocalcific disease as well. Diuresis as above.   [] Continue aspirin, statin     Thank you for the consult. We will continue to follow along.    Joesph Ojeda MD  Cardiology  Assessment:  Radha Zhang is an 80 year old woman with past medical history of Hypertension, Diabetes mellitus, HFrEF (LVEF 35-40% in 12/2020), Severe aortic stenosis and moderate-to-severe pulmonary hypertension who presents with increasing dyspnea and leg swelling found to have acute on chronic heart failure exacerbation, also with large right pleural effusion now s/p right chest tube.    TTE with moderate LV dysfunction and wall motion abnormalities which may suggest multi-vessel CAD, also with severe aortic stenosis and moderate to severe mitral regurgitation and calcification of mitral valve.       Recommendations:   [] HFrEF: Patient appears volume overloaded today still despite several days of IV diuretics. Plan for repeat pro BNP tomorrow. Will continue Lasix 40 mg IVP BID today, renal function stable. Will plan for left and right heart catherization tomorrow, please keep NPO after MN tonight. Continue guideline directed medical therapy: metoprolol succinate 25 mg daily, lisinopril 2.5 mg daily (can consider transition to Entresto as outpatient).   [] Moderate to severe mitral regurgitation: JOE with no obvious valvular vegetation visualized. This may be functional in etiology and due to significant mitral fibrocalcific disease as well. Diuresis as above.   [] Severe aortic stenosis: Review of TTE images with low flow, low gradient severe aortic stenosis, however patient with significant aortic valve leaflet calcification with reduced opening. Will follow-up JOE.   [] Continue aspirin, statin     Thank you for the consult. We will continue to follow along.    Joesph Ojeda MD  Cardiology

## 2020-12-28 NOTE — DIETITIAN INITIAL EVALUATION ADULT. - OTHER INFO
81yo female admitted with worsening SOB B/L LE swelling, orthopnea and cough x several days. Aware PMHx T2DM (Hbg A1c 6.7%), HTN, HLD. Pt s/p R. pigtail chest tube (12/23) 2/2 large R. pleural effusion. CT chest showed hepatic cirrhosis and ascites. Pt states UBW 155lbs, aware weight at admission 168.8lbs likely fluid related. Pt reports eating well at home, adds a lot of salt to her food. Pt eating well in house with good PO completing >75% of meals. Pt was provided verbal and written nutrition education for low Na and carb counting, encouraged follow-up with outpatient RD as feasible. RD to remain available.

## 2020-12-28 NOTE — PROGRESS NOTE ADULT - ASSESSMENT
79yo F with pmh of chf, DM2, HTN, HLD presented to ED c/o worsening SOB, b/l LE swellings and orthopnea and cough for the past several days. Pt reports to sob even at rest and the swellings in her legs has been causing her difficulty with walking. She saw her PMD who told her that she has fluid in her lung, and sent her to ED. Denies cp, fever, chills, n/v. In the ED found to have elevated BNP 19690, CT chest with large right pleural effusion.     Acute decompensated systolic HF with large rt pleural effusion    s/p chest tube placement and subsequent removal    c/w IV lasix    liver cirrhosis with ascites: likely 2/2 heart disease    see above    Severe AS With hypodensity MV   s/p JOE today, awaiting report   bishnu TILLEY    Diabetes   -hold home po meds  -ISS, hypoglycemic protocol, FSG  -A1c 6.7    HTN/HLD  -cont with Lisinopril and metoprolol with holding parameter   -cont with atorvastatin 20mg daily     dizziness: may related to severe AS    negative CT head, prn meclizine.    DVT ppx   -heparin    [facility] DW PT- PT WANTS TO BE FULL CODE INCLUDE INTUBATION, RESUSCITATION . Pt has friend Oliva but pt wants to make her own decision and does not want to involve her in care now.

## 2020-12-28 NOTE — CHART NOTE - NSCHARTNOTEFT_GEN_A_CORE
Department of Cardiology                                                                  [facility] [address] [address] [facility]                                                            Telephone: [phone]. Fax:[phone]                                                                                   Pre Cath/JOE Note    80y Female     Planned Procedure: JOE, possible cath    Pertinent Prior Medications:        Antiplatelet: N/A       Aspirin: 81 mg daily       Statin: Lipiotr 20 mg daily       Beta Blocker: Toprol 25 mg daily       CCB: N/A       Other Antianginal: N/A       ACEI: Lisinopril 2.5 mg daily       Other: Lasix 40 mg IV BID    ASA: 3  Mallampati: 2  CCS Class: 3  GFR: 90  Adjusted Bleeding Risk: 5.4%    HPI: 79yo F with pmh of chf, DM, HTN, HLD presented to ED c/o worsening SOB, b/l LE swellings and orthopnea and cough for the past several days. Pt reports to sob even at rest and the swellings in her legs has been causing her difficulty with walking. She saw her PMD who told her that she has fluid in her lung, and sent her to ED. Denies cp, fever, chills, n/v. In the ED found to have elevated BNP 19690, CT chest with large right pleural effusion.  (22 Dec 2020 22:26)    Nuclear Stress Test: N/A     Echo:   Left Ventricle: Endocardial visualization was enhanced with intravenous echo contrast. The left ventricular internal cavity size is normal. Left ventricular wall thickness is severely increased. Global LV systolic function was moderately decreased. Left ventricular ejection fraction, by visual estimation, is 35 to 40%. Spectral Doppler shows pseudonormal pattern of left ventricular myocardial filling (Grade II diastolic dysfunction). Elevated left atrial and left ventricular end-diastolic pressures.  LV Wall Scoring: The entire septum is hypokinetic.  Right Ventricle: The right ventricular size is normal. RV systolic function is normal. TV S' 0.1 m/s.  Left Atrium: Severely enlarged left atrium.  Right Atrium: Moderately enlarged right atrium.  Pericardium: There is no evidence of pericardial effusion.  Mitral Valve: Mild thickening and calcification of the anterior and posterior mitral valve leaflets. There is moderate mitral annular calcification. Moderate to severe mitral valve regurgitation is seen. A mobile echodensity on the mitral valve. Clinical correlations is recommended. Consider JOE if clinically indicated.  Tricuspid Valve: The tricuspid valve is normal in structure. Moderate tricuspid regurgitation is visualized. Estimated pulmonary artery systolic pressure is 58.1 mmHg assuming a right atrial pressure of 8 mmHg, which is consistent with moderate pulmonary hypertension.  Aortic Valve: The aortic valve is trileaflet. Peak transaortic gradient equals 95.5 mmHg, mean transaortic gradient equals 65.0 mmHg, the calculated aortic valve area equals 0.35 cm² by the continuity equation consistent with severe aortic stenosis. Mild aortic valve regurgitation is seen.  Pulmonic Valve: The pulmonic valve is normal. Trace pulmonic valve regurgitation.  Aorta: The aortic root is normal in size and structure. There is dilatation of the ascending aorta.  Pulmonary Artery: The main pulmonary artery is normal in size.  Venous: A systolic blunting flow pattern is recorded from the right upper pulmonary vein. The inferior vena cava was normal sized, with respiratory size variation less than 50%.    Allergies: No Known Allergies    PAST MEDICAL & SURGICAL HISTORY:  Vertigo  Hypertension  DM (diabetes mellitus)  Acute CHF (congestive heart failure)  S/P cholecystectomy  S/P left knee surgery    Home Medications:  glipiZIDE 10 mg oral tablet: 1 tab(s) orally once a day (22 Dec 2020 22:59)  Lasix 40 mg oral tablet: 1 tab(s) orally once a day (22 Dec 2020 22:59)  lisinopril 2.5 mg oral tablet: 1 tab(s) orally once a day (22 Dec 2020 22:59)  meclizine 12.5 mg oral tablet: 1 tab(s) orally 3 times a day (22 Dec 2020 22:59)  metFORMIN 500 mg oral tablet, extended release: 1 tab(s) orally once a day (22 Dec 2020 22:59)  metoprolol succinate 50 mg oral tablet, extended release: 1 tab(s) orally once a day (22 Dec 2020 22:59)  Multiple Vitamins oral tablet: 1 tab(s) orally once a day (22 Dec 2020 22:59)  omeprazole 20 mg oral delayed release capsule: 1 cap(s) orally once a day (22 Dec 2020 22:59)  oxybutynin 5 mg/24 hours oral tablet, extended release:  (22 Dec 2020 22:59)  potassium chloride 20 mEq oral granule, extended release: 1 tab(s) orally once a day (22 Dec 2020 22:59)  rosuvastatin 5 mg oral tablet: 1 tab(s) orally once a day (22 Dec 2020 22:59)  Vitamin D3 1000 intl units (25 mcg) oral capsule: 1 tab(s) orally once a day (22 Dec 2020 22:59)    MEDICATIONS  (STANDING):  aspirin enteric coated 81 milliGRAM(s) Oral daily  atorvastatin 20 milliGRAM(s) Oral at bedtime  cholecalciferol 1000 Unit(s) Oral daily  dextrose 40% Gel 15 Gram(s) Oral once  dextrose 5%. 1000 milliLiter(s) (50 mL/Hr) IV Continuous <Continuous>  dextrose 5%. 1000 milliLiter(s) (100 mL/Hr) IV Continuous <Continuous>  dextrose 50% Injectable 25 Gram(s) IV Push once  dextrose 50% Injectable 12.5 Gram(s) IV Push once  dextrose 50% Injectable 25 Gram(s) IV Push once  furosemide   Injectable 40 milliGRAM(s) IV Push two times a day  glucagon  Injectable 1 milliGRAM(s) IntraMuscular once  heparin   Injectable 5000 Unit(s) SubCutaneous every 12 hours  influenza   Vaccine 0.5 milliLiter(s) IntraMuscular once  insulin lispro (ADMELOG) corrective regimen sliding scale   SubCutaneous three times a day before meals  lidocaine 1% (Preservative-free) Injectable 1 milliLiter(s) Local Injection once  lisinopril 2.5 milliGRAM(s) Oral daily  magnesium sulfate  IVPB 2 Gram(s) IV Intermittent once  metoprolol succinate ER 25 milliGRAM(s) Oral daily  multivitamin 1 Tablet(s) Oral daily  oxybutynin 5 milliGRAM(s) Oral daily  pantoprazole    Tablet 40 milliGRAM(s) Oral before breakfast    MEDICATIONS  (PRN):  acetaminophen   Tablet .. 650 milliGRAM(s) Oral every 6 hours PRN Mild Pain (1 - 3)  meclizine 12.5 milliGRAM(s) Oral three times a day PRN Dizziness    Physical Examination:   General: Awake, alert, speech clear, no acute distress.  Neck: No bruit, normal jugular venous pressures  Chest: Clear CTA, S1, S2, + grade 3/6 systolic murmur, RRR  Extremities: No edema, BLE DP pulses by doppler                          9.8    6.92  )-----------( 136      ( 28 Dec 2020 07:58 )             33.4     12-28    135  |  91    |  23.0  ----------------------------<  213  3.6   |  31.0  |  0.53    Ca    9.1      28 Dec 2020 07:56  Phos  3.3     12-27  Mg     1.6     12-28      Assessment and Plan:   The patient was examined and interviewed by me today. There has been no change from the original history and physical except as noted above.    Problem List:   1. HFrEF  [facility] to evaluate for CAD (consent obtained prior to JOE).  GDMT: Toprol 50 mg daily, lisinopril 2.5 mg daily.  Lasix 40 mg IV BID    2. Severe AS  JOE to assess severity of AS    3. Mobile echodensity on mitral valve with moderate to severe MR  JOE to assess echodensity and MR    4. Hypomagnesemia  Magnesium sulfate 2 grams IV

## 2020-12-28 NOTE — PROGRESS NOTE ADULT - SUBJECTIVE AND OBJECTIVE BOX
ERIKA ORTIZ  [id number]      Chief Complaint: New onset CHF/Severe aortic stenosis      Interval History:      Tele:      Current meds:   acetaminophen   Tablet .. 650 milliGRAM(s) Oral every 6 hours PRN  aspirin enteric coated 81 milliGRAM(s) Oral daily  atorvastatin 20 milliGRAM(s) Oral at bedtime  cholecalciferol 1000 Unit(s) Oral daily  dextrose 40% Gel 15 Gram(s) Oral once  dextrose 5%. 1000 milliLiter(s) IV Continuous <Continuous>  dextrose 5%. 1000 milliLiter(s) IV Continuous <Continuous>  dextrose 50% Injectable 25 Gram(s) IV Push once  dextrose 50% Injectable 12.5 Gram(s) IV Push once  dextrose 50% Injectable 25 Gram(s) IV Push once  furosemide   Injectable 40 milliGRAM(s) IV Push two times a day  glucagon  Injectable 1 milliGRAM(s) IntraMuscular once  heparin   Injectable 5000 Unit(s) SubCutaneous every 12 hours  influenza   Vaccine 0.5 milliLiter(s) IntraMuscular once  insulin lispro (ADMELOG) corrective regimen sliding scale   SubCutaneous three times a day before meals  lidocaine 1% (Preservative-free) Injectable 1 milliLiter(s) Local Injection once  lisinopril 2.5 milliGRAM(s) Oral daily  meclizine 12.5 milliGRAM(s) Oral three times a day PRN  metoprolol succinate ER 25 milliGRAM(s) Oral daily  multivitamin 1 Tablet(s) Oral daily  oxybutynin 5 milliGRAM(s) Oral daily  pantoprazole    Tablet 40 milliGRAM(s) Oral before breakfast      Objective:     Vital Signs:   T(C): 36.8 (12-28-20 @ 05:52), Max: 36.8 (12-27-20 @ 20:59)  HR: 91 (12-28-20 @ 08:23) (78 - 108)  BP: 103/78 (12-28-20 @ 08:23) (99/59 - 114/75)  RR: 18 (12-28-20 @ 08:23) (18 - 20)  SpO2: 100% (12-28-20 @ 08:23) (94% - 100%)  Wt(kg): --    Physical Exam:   General: Comfortable in NAD  Neck: No JVD  CVS: nl s1s2, no s3  Pulm: CTA b/l  Abd: soft, non-tender  Ext: No c/c/e  Neuro A&O x3  Psych: Normal affect      Labs:   28 Dec 2020 07:56    135    |  91     |  23.0   ----------------------------<  213    3.6     |  31.0   |  0.53     Ca    9.1        28 Dec 2020 07:56  Phos  3.3       27 Dec 2020 11:04  Mg     1.6       28 Dec 2020 07:56                            9.8    6.92  )-----------( 136      ( 28 Dec 2020 07:58 )             33.4            ERIKA ORTIZ  [id number]      Chief Complaint: New onset CHF/Severe aortic stenosis      Interval History:      Tele:      Current meds:   acetaminophen   Tablet .. 650 milliGRAM(s) Oral every 6 hours PRN  aspirin enteric coated 81 milliGRAM(s) Oral daily  atorvastatin 20 milliGRAM(s) Oral at bedtime  cholecalciferol 1000 Unit(s) Oral daily  dextrose 40% Gel 15 Gram(s) Oral once  dextrose 5%. 1000 milliLiter(s) IV Continuous <Continuous>  dextrose 5%. 1000 milliLiter(s) IV Continuous <Continuous>  dextrose 50% Injectable 25 Gram(s) IV Push once  dextrose 50% Injectable 12.5 Gram(s) IV Push once  dextrose 50% Injectable 25 Gram(s) IV Push once  furosemide   Injectable 40 milliGRAM(s) IV Push two times a day  glucagon  Injectable 1 milliGRAM(s) IntraMuscular once  heparin   Injectable 5000 Unit(s) SubCutaneous every 12 hours  influenza   Vaccine 0.5 milliLiter(s) IntraMuscular once  insulin lispro (ADMELOG) corrective regimen sliding scale   SubCutaneous three times a day before meals  lidocaine 1% (Preservative-free) Injectable 1 milliLiter(s) Local Injection once  lisinopril 2.5 milliGRAM(s) Oral daily  meclizine 12.5 milliGRAM(s) Oral three times a day PRN  metoprolol succinate ER 25 milliGRAM(s) Oral daily  multivitamin 1 Tablet(s) Oral daily  oxybutynin 5 milliGRAM(s) Oral daily  pantoprazole    Tablet 40 milliGRAM(s) Oral before breakfast      Objective:     Vital Signs:   T(C): 36.8 (12-28-20 @ 05:52), Max: 36.8 (12-27-20 @ 20:59)  HR: 91 (12-28-20 @ 08:23) (78 - 108)  BP: 103/78 (12-28-20 @ 08:23) (99/59 - 114/75)  RR: 18 (12-28-20 @ 08:23) (18 - 20)  SpO2: 100% (12-28-20 @ 08:23) (94% - 100%)  Wt(kg): --    Physical Exam:   General: Comfortable in NAD  Neck: No JVD  CVS: nl s1s2, no s3  Pulm: CTA b/l  Abd: soft, non-tender  Ext: No c/c/e  Neuro A&O x3  Psych: Normal affect      Labs:   28 Dec 2020 07:56    135    |  91     |  23.0   ----------------------------<  213    3.6     |  31.0   |  0.53     Ca    9.1        28 Dec 2020 07:56  Phos  3.3       27 Dec 2020 11:04  Mg     1.6       28 Dec 2020 07:56                            9.8    6.92  )-----------( 136      ( 28 Dec 2020 07:58 )             33.4         ECG (12/22/2020): sinus rhythm, first degree AV block, LAD, old anteroseptal infarct, lateral ST/T wave abnormalities      TTE (12/23/2020):     ERIKA ORTIZ  [id number]      Chief Complaint: New onset CHF/Severe aortic stenosis      Interval History:      Tele:      Current meds:   acetaminophen   Tablet .. 650 milliGRAM(s) Oral every 6 hours PRN  aspirin enteric coated 81 milliGRAM(s) Oral daily  atorvastatin 20 milliGRAM(s) Oral at bedtime  cholecalciferol 1000 Unit(s) Oral daily  dextrose 40% Gel 15 Gram(s) Oral once  dextrose 5%. 1000 milliLiter(s) IV Continuous <Continuous>  dextrose 5%. 1000 milliLiter(s) IV Continuous <Continuous>  dextrose 50% Injectable 25 Gram(s) IV Push once  dextrose 50% Injectable 12.5 Gram(s) IV Push once  dextrose 50% Injectable 25 Gram(s) IV Push once  furosemide   Injectable 40 milliGRAM(s) IV Push two times a day  glucagon  Injectable 1 milliGRAM(s) IntraMuscular once  heparin   Injectable 5000 Unit(s) SubCutaneous every 12 hours  influenza   Vaccine 0.5 milliLiter(s) IntraMuscular once  insulin lispro (ADMELOG) corrective regimen sliding scale   SubCutaneous three times a day before meals  lidocaine 1% (Preservative-free) Injectable 1 milliLiter(s) Local Injection once  lisinopril 2.5 milliGRAM(s) Oral daily  meclizine 12.5 milliGRAM(s) Oral three times a day PRN  metoprolol succinate ER 25 milliGRAM(s) Oral daily  multivitamin 1 Tablet(s) Oral daily  oxybutynin 5 milliGRAM(s) Oral daily  pantoprazole    Tablet 40 milliGRAM(s) Oral before breakfast      Objective:     Vital Signs:   T(C): 36.8 (12-28-20 @ 05:52), Max: 36.8 (12-27-20 @ 20:59)  HR: 91 (12-28-20 @ 08:23) (78 - 108)  BP: 103/78 (12-28-20 @ 08:23) (99/59 - 114/75)  RR: 18 (12-28-20 @ 08:23) (18 - 20)  SpO2: 100% (12-28-20 @ 08:23) (94% - 100%)  Wt(kg): --    Physical Exam:   General: Comfortable in NAD  Neck: No JVD  CVS: nl s1s2, no s3  Pulm: CTA b/l  Abd: soft, non-tender  Ext: No c/c/e  Neuro A&O x3  Psych: Normal affect      Labs:   28 Dec 2020 07:56    135    |  91     |  23.0   ----------------------------<  213    3.6     |  31.0   |  0.53     Ca    9.1        28 Dec 2020 07:56  Phos  3.3       27 Dec 2020 11:04  Mg     1.6       28 Dec 2020 07:56                            9.8    6.92  )-----------( 136      ( 28 Dec 2020 07:58 )             33.4         ECG (12/22/2020): sinus rhythm, first degree AV block, LAD, old anteroseptal infarct, lateral ST/T wave abnormalities      TTE (12/23/2020):   1. Left ventricular ejection fraction, by visual estimation, is 35 to 40%.   2. Moderately decreased global left ventricular systolic function.   3. Entire septum is abnormal as described above.   4. Elevated left atrial and left ventricular end-diastolic pressures.   5. Severely increased LV wall thickness.   6. Spectral Doppler shows pseudonormal pattern of left ventricular myocardial filling (Grade II diastolic dysfunction).   7. Mild thickening and calcification of the anterior and posterior mitral valve leaflets.   8. Moderate mitral annular calcification.   9. A mobile echodensity on the mitral valve. Clinical correlations is recommended. Consider JOE if clinically indicated.  10. Moderate to severe mitral valve regurgitation.  11. Estimated pulmonary artery systolic pressure is 58.1 mmHg assuming a right atrial pressure of 8 mmHg, which is consistent with moderate pulmonary hypertension.  12. Mild aortic regurgitation.  13. Peak transaortic gradient equals 95.5 mmHg, mean transaortic gradient equals 65.0 mmHg, the calculated aortic valve area equals 0.35 cm² by the continuity equation consistent with severe aortic stenosis.  14. Dilatation of the ascending aorta.  15. Endocardial visualization was enhanced with intravenous echo contrast.  16. Case d/w Dr. Maddox at the time of the conclusion of the study.     ERIKA ORTIZ  [id number]      Chief Complaint: New onset CHF/Right pleural effusion/Severe aortic stenosis    Interval History: The patient reports feeling ok, less dyspnea. Consent obtained for JOE.    Tele: sinus rhythm       Current meds:   acetaminophen   Tablet .. 650 milliGRAM(s) Oral every 6 hours PRN  aspirin enteric coated 81 milliGRAM(s) Oral daily  atorvastatin 20 milliGRAM(s) Oral at bedtime  cholecalciferol 1000 Unit(s) Oral daily  dextrose 40% Gel 15 Gram(s) Oral once  dextrose 5%. 1000 milliLiter(s) IV Continuous <Continuous>  dextrose 5%. 1000 milliLiter(s) IV Continuous <Continuous>  dextrose 50% Injectable 25 Gram(s) IV Push once  dextrose 50% Injectable 12.5 Gram(s) IV Push once  dextrose 50% Injectable 25 Gram(s) IV Push once  furosemide   Injectable 40 milliGRAM(s) IV Push two times a day  glucagon  Injectable 1 milliGRAM(s) IntraMuscular once  heparin   Injectable 5000 Unit(s) SubCutaneous every 12 hours  influenza   Vaccine 0.5 milliLiter(s) IntraMuscular once  insulin lispro (ADMELOG) corrective regimen sliding scale   SubCutaneous three times a day before meals  lidocaine 1% (Preservative-free) Injectable 1 milliLiter(s) Local Injection once  lisinopril 2.5 milliGRAM(s) Oral daily  meclizine 12.5 milliGRAM(s) Oral three times a day PRN  metoprolol succinate ER 25 milliGRAM(s) Oral daily  multivitamin 1 Tablet(s) Oral daily  oxybutynin 5 milliGRAM(s) Oral daily  pantoprazole    Tablet 40 milliGRAM(s) Oral before breakfast      Objective:     Vital Signs:   T(C): 36.8 (12-28-20 @ 05:52), Max: 36.8 (12-27-20 @ 20:59)  HR: 91 (12-28-20 @ 08:23) (78 - 108)  BP: 103/78 (12-28-20 @ 08:23) (99/59 - 114/75)  RR: 18 (12-28-20 @ 08:23) (18 - 20)  SpO2: 100% (12-28-20 @ 08:23) (94% - 100%)  Wt(kg): --    Physical Exam:   General: obese woman, no acute distress  Neck: supple  CVS: JVP difficult to assess, RRR, s1, s2, systolic murmur at RUSB  Pulm: unlabored respirations, coarse breath sounds   Abd: obese  Ext: mild lower extremity edema b/l   Neuro A&O x3  Psych: Normal affect      Labs:   28 Dec 2020 07:56    135    |  91     |  23.0   ----------------------------<  213    3.6     |  31.0   |  0.53     Ca    9.1        28 Dec 2020 07:56  Phos  3.3       27 Dec 2020 11:04  Mg     1.6       28 Dec 2020 07:56                            9.8    6.92  )-----------( 136      ( 28 Dec 2020 07:58 )             33.4         ECG (12/22/2020): sinus rhythm, first degree AV block, LAD, old anteroseptal infarct, lateral ST/T wave abnormalities      TTE (12/23/2020):   1. Left ventricular ejection fraction, by visual estimation, is 35 to 40%.   2. Moderately decreased global left ventricular systolic function.   3. Entire septum is abnormal as described above.   4. Elevated left atrial and left ventricular end-diastolic pressures.   5. Severely increased LV wall thickness.   6. Spectral Doppler shows pseudonormal pattern of left ventricular myocardial filling (Grade II diastolic dysfunction).   7. Mild thickening and calcification of the anterior and posterior mitral valve leaflets.   8. Moderate mitral annular calcification.   9. A mobile echodensity on the mitral valve. Clinical correlations is recommended. Consider JOE if clinically indicated.  10. Moderate to severe mitral valve regurgitation.  11. Estimated pulmonary artery systolic pressure is 58.1 mmHg assuming a right atrial pressure of 8 mmHg, which is consistent with moderate pulmonary hypertension.  12. Mild aortic regurgitation.  13. Peak transaortic gradient equals 95.5 mmHg, mean transaortic gradient equals 65.0 mmHg, the calculated aortic valve area equals 0.35 cm² by the continuity equation consistent with severe aortic stenosis.  14. Dilatation of the ascending aorta.  15. Endocardial visualization was enhanced with intravenous echo contrast.  16. Case d/w Dr. Maddox at the time of the conclusion of the study.

## 2020-12-28 NOTE — PROGRESS NOTE ADULT - SUBJECTIVE AND OBJECTIVE BOX
seen for HF, AS    no acute complaints. mild sore throat from JOE  hemorrhoidal pain--declines rectal exam  ros negative     MEDICATIONS  (STANDING):  aspirin enteric coated 81 milliGRAM(s) Oral daily  atorvastatin 20 milliGRAM(s) Oral at bedtime  cholecalciferol 1000 Unit(s) Oral daily  dextrose 40% Gel 15 Gram(s) Oral once  dextrose 5%. 1000 milliLiter(s) (50 mL/Hr) IV Continuous <Continuous>  dextrose 5%. 1000 milliLiter(s) (100 mL/Hr) IV Continuous <Continuous>  dextrose 50% Injectable 25 Gram(s) IV Push once  dextrose 50% Injectable 12.5 Gram(s) IV Push once  dextrose 50% Injectable 25 Gram(s) IV Push once  furosemide   Injectable 40 milliGRAM(s) IV Push two times a day  glucagon  Injectable 1 milliGRAM(s) IntraMuscular once  heparin   Injectable 5000 Unit(s) SubCutaneous every 12 hours  influenza   Vaccine 0.5 milliLiter(s) IntraMuscular once  insulin lispro (ADMELOG) corrective regimen sliding scale   SubCutaneous three times a day before meals  lidocaine 1% (Preservative-free) Injectable 1 milliLiter(s) Local Injection once  lisinopril 2.5 milliGRAM(s) Oral daily  metoprolol succinate ER 25 milliGRAM(s) Oral daily  multivitamin 1 Tablet(s) Oral daily  oxybutynin 5 milliGRAM(s) Oral daily  pantoprazole    Tablet 40 milliGRAM(s) Oral before breakfast    MEDICATIONS  (PRN):  acetaminophen   Tablet .. 650 milliGRAM(s) Oral every 6 hours PRN Mild Pain (1 - 3)  benzocaine 15 mG/menthol 3.6 mG (Sugar-Free) Lozenge 1 Lozenge Oral four times a day PRN Sore Throat  hydrocortisone hemorrhoidal Suppository 1 Suppository(s) Rectal daily PRN hemorrhoidal pain  meclizine 12.5 milliGRAM(s) Oral three times a day PRN Dizziness      Allergies    No Known Allergies      Vital Signs Last 24 Hrs  T(C): 36.7 (28 Dec 2020 12:40), Max: 36.8 (27 Dec 2020 20:59)  T(F): 98 (28 Dec 2020 12:40), Max: 98.3 (27 Dec 2020 20:59)  HR: 78 (28 Dec 2020 12:40) (78 - 108)  BP: 93/60 (28 Dec 2020 12:40) (93/60 - 115/62)  BP(mean): 88 (28 Dec 2020 05:52) (77 - 88)  RR: 18 (28 Dec 2020 12:40) (16 - 20)  SpO2: 100% (28 Dec 2020 12:40) (94% - 100%)    PHYSICAL EXAM:    GENERAL: NAD  CHEST/LUNG: dec bs at bases   HEART: Regular rate and rhythm; S1 S2  ABDOMEN: Soft,  Nondistended; Bowel sounds present  EXTREMITIES:  2+ edema (improved)  NERVOUS SYSTEM:  Alert & Oriented X3, Motor Strength 5/5 B/L upper and lower extremities      LABS:                        9.8    6.92  )-----------( 136      ( 28 Dec 2020 07:58 )             33.4     12-28    135  |  91<L>  |  23.0<H>  ----------------------------<  213<H>  3.6   |  31.0<H>  |  0.53    Ca    9.1      28 Dec 2020 07:56  Phos  3.3     12-27  Mg     1.6     12-28            CAPILLARY BLOOD GLUCOSE      POCT Blood Glucose.: 225 mg/dL (28 Dec 2020 12:15)  POCT Blood Glucose.: 197 mg/dL (28 Dec 2020 07:56)  POCT Blood Glucose.: 290 mg/dL (28 Dec 2020 01:37)  POCT Blood Glucose.: 281 mg/dL (28 Dec 2020 01:25)  POCT Blood Glucose.: 323 mg/dL (27 Dec 2020 21:22)  POCT Blood Glucose.: 186 mg/dL (27 Dec 2020 17:00)        RADIOLOGY & ADDITIONAL TESTS:

## 2020-12-28 NOTE — DIETITIAN INITIAL EVALUATION ADULT. - PERTINENT LABORATORY DATA
12-28 Na135 mmol/L Glu 213 mg/dL<H> K+ 3.6 mmol/L Cr  0.53 mg/dL BUN 23.0 mg/dL<H> Phos n/a   Alb n/a   PAB n/a

## 2020-12-29 DIAGNOSIS — I35.0 NONRHEUMATIC AORTIC (VALVE) STENOSIS: ICD-10-CM

## 2020-12-29 DIAGNOSIS — I07.1 RHEUMATIC TRICUSPID INSUFFICIENCY: ICD-10-CM

## 2020-12-29 DIAGNOSIS — I34.0 NONRHEUMATIC MITRAL (VALVE) INSUFFICIENCY: ICD-10-CM

## 2020-12-29 LAB
ANION GAP SERPL CALC-SCNC: 11 MMOL/L — SIGNIFICANT CHANGE UP (ref 5–17)
APPEARANCE UR: CLEAR — SIGNIFICANT CHANGE UP
BACTERIA # UR AUTO: ABNORMAL
BILIRUB UR-MCNC: NEGATIVE — SIGNIFICANT CHANGE UP
BUN SERPL-MCNC: 24 MG/DL — HIGH (ref 8–20)
CALCIUM SERPL-MCNC: 9.1 MG/DL — SIGNIFICANT CHANGE UP (ref 8.6–10.2)
CHLORIDE SERPL-SCNC: 89 MMOL/L — LOW (ref 98–107)
CO2 SERPL-SCNC: 35 MMOL/L — HIGH (ref 22–29)
COLOR SPEC: YELLOW — SIGNIFICANT CHANGE UP
CREAT SERPL-MCNC: 0.52 MG/DL — SIGNIFICANT CHANGE UP (ref 0.5–1.3)
DIFF PNL FLD: ABNORMAL
EPI CELLS # UR: SIGNIFICANT CHANGE UP
GLUCOSE BLDC GLUCOMTR-MCNC: 167 MG/DL — HIGH (ref 70–99)
GLUCOSE BLDC GLUCOMTR-MCNC: 203 MG/DL — HIGH (ref 70–99)
GLUCOSE BLDC GLUCOMTR-MCNC: 282 MG/DL — HIGH (ref 70–99)
GLUCOSE SERPL-MCNC: 223 MG/DL — HIGH (ref 70–99)
GLUCOSE UR QL: NEGATIVE — SIGNIFICANT CHANGE UP
HCT VFR BLD CALC: 32.9 % — LOW (ref 34.5–45)
HGB BLD-MCNC: 9.7 G/DL — LOW (ref 11.5–15.5)
KETONES UR-MCNC: ABNORMAL
LEUKOCYTE ESTERASE UR-ACNC: ABNORMAL
MAGNESIUM SERPL-MCNC: 1.8 MG/DL — SIGNIFICANT CHANGE UP (ref 1.6–2.6)
MCHC RBC-ENTMCNC: 26.1 PG — LOW (ref 27–34)
MCHC RBC-ENTMCNC: 29.5 GM/DL — LOW (ref 32–36)
MCV RBC AUTO: 88.7 FL — SIGNIFICANT CHANGE UP (ref 80–100)
NITRITE UR-MCNC: POSITIVE
NT-PROBNP SERPL-SCNC: HIGH PG/ML (ref 0–300)
PH UR: 5 — SIGNIFICANT CHANGE UP (ref 5–8)
PLATELET # BLD AUTO: 131 K/UL — LOW (ref 150–400)
POTASSIUM SERPL-MCNC: 3.9 MMOL/L — SIGNIFICANT CHANGE UP (ref 3.5–5.3)
POTASSIUM SERPL-SCNC: 3.9 MMOL/L — SIGNIFICANT CHANGE UP (ref 3.5–5.3)
PROT UR-MCNC: 30 MG/DL
RBC # BLD: 3.71 M/UL — LOW (ref 3.8–5.2)
RBC # FLD: 18.1 % — HIGH (ref 10.3–14.5)
RBC CASTS # UR COMP ASSIST: ABNORMAL /HPF (ref 0–4)
SODIUM SERPL-SCNC: 135 MMOL/L — SIGNIFICANT CHANGE UP (ref 135–145)
SP GR SPEC: 1.01 — SIGNIFICANT CHANGE UP (ref 1.01–1.02)
UROBILINOGEN FLD QL: 1
WBC # BLD: 5.83 K/UL — SIGNIFICANT CHANGE UP (ref 3.8–10.5)
WBC # FLD AUTO: 5.83 K/UL — SIGNIFICANT CHANGE UP (ref 3.8–10.5)
WBC UR QL: ABNORMAL

## 2020-12-29 PROCEDURE — 99152 MOD SED SAME PHYS/QHP 5/>YRS: CPT

## 2020-12-29 PROCEDURE — 99222 1ST HOSP IP/OBS MODERATE 55: CPT

## 2020-12-29 PROCEDURE — 93460 R&L HRT ART/VENTRICLE ANGIO: CPT | Mod: 26

## 2020-12-29 PROCEDURE — 99232 SBSQ HOSP IP/OBS MODERATE 35: CPT

## 2020-12-29 PROCEDURE — 93880 EXTRACRANIAL BILAT STUDY: CPT | Mod: 26

## 2020-12-29 RX ADMIN — Medication 40 MILLIGRAM(S): at 05:56

## 2020-12-29 RX ADMIN — Medication 650 MILLIGRAM(S): at 17:33

## 2020-12-29 RX ADMIN — Medication 81 MILLIGRAM(S): at 09:04

## 2020-12-29 RX ADMIN — Medication 1: at 17:33

## 2020-12-29 RX ADMIN — BENZOCAINE AND MENTHOL 1 LOZENGE: 5; 1 LIQUID ORAL at 05:57

## 2020-12-29 RX ADMIN — HEPARIN SODIUM 5000 UNIT(S): 5000 INJECTION INTRAVENOUS; SUBCUTANEOUS at 17:34

## 2020-12-29 RX ADMIN — Medication 5 MILLIGRAM(S): at 17:34

## 2020-12-29 RX ADMIN — Medication 650 MILLIGRAM(S): at 19:29

## 2020-12-29 RX ADMIN — Medication 1 TABLET(S): at 17:34

## 2020-12-29 RX ADMIN — Medication 2: at 08:56

## 2020-12-29 RX ADMIN — Medication 40 MILLIGRAM(S): at 17:34

## 2020-12-29 RX ADMIN — Medication 650 MILLIGRAM(S): at 09:02

## 2020-12-29 RX ADMIN — ATORVASTATIN CALCIUM 20 MILLIGRAM(S): 80 TABLET, FILM COATED ORAL at 21:15

## 2020-12-29 RX ADMIN — LISINOPRIL 2.5 MILLIGRAM(S): 2.5 TABLET ORAL at 05:56

## 2020-12-29 RX ADMIN — Medication 1 SUPPOSITORY(S): at 06:42

## 2020-12-29 RX ADMIN — Medication 1000 UNIT(S): at 21:15

## 2020-12-29 RX ADMIN — Medication 25 MILLIGRAM(S): at 05:57

## 2020-12-29 RX ADMIN — PANTOPRAZOLE SODIUM 40 MILLIGRAM(S): 20 TABLET, DELAYED RELEASE ORAL at 05:56

## 2020-12-29 RX ADMIN — Medication 650 MILLIGRAM(S): at 10:00

## 2020-12-29 NOTE — PROGRESS NOTE ADULT - SUBJECTIVE AND OBJECTIVE BOX
ERIKA ORTIZ  [id number]      Chief Complaint: New onset CHF/Right pleural effusion/Severe aortic stenosis    Interval History: The patient had left and right heart catherization today. Reports breathing is better than when she was admitted. Denies chest pain.     Tele: sinus rhythm       Current meds:   acetaminophen   Tablet .. 650 milliGRAM(s) Oral every 6 hours PRN  aspirin enteric coated 81 milliGRAM(s) Oral daily  atorvastatin 20 milliGRAM(s) Oral at bedtime  benzocaine 15 mG/menthol 3.6 mG (Sugar-Free) Lozenge 1 Lozenge Oral four times a day PRN  cholecalciferol 1000 Unit(s) Oral daily  dextrose 40% Gel 15 Gram(s) Oral once  dextrose 5%. 1000 milliLiter(s) IV Continuous <Continuous>  dextrose 5%. 1000 milliLiter(s) IV Continuous <Continuous>  dextrose 50% Injectable 25 Gram(s) IV Push once  dextrose 50% Injectable 12.5 Gram(s) IV Push once  dextrose 50% Injectable 25 Gram(s) IV Push once  furosemide   Injectable 40 milliGRAM(s) IV Push two times a day  glucagon  Injectable 1 milliGRAM(s) IntraMuscular once  heparin   Injectable 5000 Unit(s) SubCutaneous every 12 hours  hydrocortisone hemorrhoidal Suppository 1 Suppository(s) Rectal daily PRN  influenza   Vaccine 0.5 milliLiter(s) IntraMuscular once  insulin lispro (ADMELOG) corrective regimen sliding scale   SubCutaneous three times a day before meals  lidocaine 1% (Preservative-free) Injectable 1 milliLiter(s) Local Injection once  lisinopril 2.5 milliGRAM(s) Oral daily  meclizine 12.5 milliGRAM(s) Oral three times a day PRN  metoprolol succinate ER 25 milliGRAM(s) Oral daily  multivitamin 1 Tablet(s) Oral daily  oxybutynin 5 milliGRAM(s) Oral daily  pantoprazole    Tablet 40 milliGRAM(s) Oral before breakfast      Objective:     Vital Signs:   T(C): 36.6 (12-29-20 @ 13:55), Max: 36.7 (12-28-20 @ 21:17)  HR: 70 (12-29-20 @ 13:55) (70 - 105)  BP: 94/62 (12-29-20 @ 13:55) (81/49 - 113/73)  RR: 19 (12-29-20 @ 13:55) (16 - 19)  SpO2: 100% (12-29-20 @ 13:55) (94% - 100%)  Wt(kg): --      Physical Exam:   General: obese woman, no acute distress  Neck: supple  CVS: JVP difficult to assess, RRR, s1, s2, systolic murmur at RUSB  Pulm: unlabored respirations, coarse breath sounds   Abd: obese  Ext: mild lower extremity edema b/l   Neuro A&O x3  Psych: Normal affect      Labs:   29 Dec 2020 06:51    135    |  89     |  24.0   ----------------------------<  223    3.9     |  35.0   |  0.52     Ca    9.1        29 Dec 2020 06:51  Mg     1.8       29 Dec 2020 06:51                            9.7    5.83  )-----------( 131      ( 29 Dec 2020 06:51 )             32.9           ECG (12/22/2020): sinus rhythm, first degree AV block, LAD, old anteroseptal infarct, lateral ST/T wave abnormalities      TTE (12/23/2020):   1. Left ventricular ejection fraction, by visual estimation, is 35 to 40%.   2. Moderately decreased global left ventricular systolic function.   3. Entire septum is abnormal as described above.   4. Elevated left atrial and left ventricular end-diastolic pressures.   5. Severely increased LV wall thickness.   6. Spectral Doppler shows pseudonormal pattern of left ventricular myocardial filling (Grade II diastolic dysfunction).   7. Mild thickening and calcification of the anterior and posterior mitral valve leaflets.   8. Moderate mitral annular calcification.   9. A mobile echodensity on the mitral valve. Clinical correlations is recommended. Consider JOE if clinically indicated.  10. Moderate to severe mitral valve regurgitation.  11. Estimated pulmonary artery systolic pressure is 58.1 mmHg assuming a right atrial pressure of 8 mmHg, which is consistent with moderate pulmonary hypertension.  12. Mild aortic regurgitation.  13. Peak transaortic gradient equals 95.5 mmHg, mean transaortic gradient equals 65.0 mmHg, the calculated aortic valve area equals 0.35 cm² by the continuity equation consistent with severe aortic stenosis.  14. Dilatation of the ascending aorta.  15. Endocardial visualization was enhanced with intravenous echo contrast.  16. Case d/w Dr. Maddox at the time of the conclusion of the study.      JOE (12/28/2020):   1. Moderately decreased global left ventricular systolic function.   2. Left ventricular ejection fraction, by visual estimation, is 30%.   3. Normal left ventricular internal cavity size.   4. Left ventricle chordal calcification.   5. Moderately reduced RV systolic function.   6. Left atrial enlargement.   7. No left atrial appendage thrombus and normal left atrial appendage velocities.   8. Mildly enlarged right atrium.   9. Color flow doppler and intravenous injection of agitated saline demonstrates the presence of an intact intra atrial septum.  10. The mitral valve leaflets appear moderately thickened and calcified. 3-D evaluation of mitral valve leaflets was performed. No independent, mobile valvular vegetation visualized on mitral valve leaflets. Moderate mitral valve regurgitation.  11. The tricuspid valve leaflets appear mildly degenerative. Moderate tricuspid valve regurgitation. No independent, mobile valvular vegetation visualized on tricuspid valve leaflets.  12. 3-D evaluation of aortic valve was performed. The aortic valve leaflets are heavily calcified with reduced leaflet excursion and opening. Severe aortic valve stenosis (peak velocity 4.9 m/s, mean gradient 58 mmHg, calculated FLOYD by continuity equation 0.33 cm^2).  13. Mild aortic regurgitation.  14. Dilated proximal ascending aorta (3.6 cm; 2.1 cm/m^2). Mild intimal thickening visualized in the descending aorta.  15. Small pericardial effusion.  16. Recommend clinical correlation with the above findings.

## 2020-12-29 NOTE — PROGRESS NOTE ADULT - SUBJECTIVE AND OBJECTIVE BOX
Department of Cardiology                                                                  [facility]/[place] [address]                                                            Telephone: [phone]. Fax:[phone]      Post- Procedure Note: Cardiac Catheterization     Narrative:  80y  Female is now s/p right and left heart catheterization via right radial and right brachial vein approach with Dr. Flores  Right radial precautions  No cardiac intervention; medical mgmt  dLAD 75%  diagonal 75%  mLAD moderate disease  peak-peak gradient 125mm  FLOYD 0.34  PAWP 29  Contrast used: omnipaque 42ml  Heparin used: 3,000 units  Will need CT consult for TAVR evaluation         PAST MEDICAL & SURGICAL HISTORY:  Vertigo    Hypertension    DM (diabetes mellitus)    Acute CHF (congestive heart failure)    S/P cholecystectomy    S/P left knee surgery      FAMILY HISTORY:  FH: hypertension      Home Medications:  glipiZIDE 10 mg oral tablet: 1 tab(s) orally once a day (22 Dec 2020 22:59)  Lasix 40 mg oral tablet: 1 tab(s) orally once a day (22 Dec 2020 22:59)  lisinopril 2.5 mg oral tablet: 1 tab(s) orally once a day (22 Dec 2020 22:59)  meclizine 12.5 mg oral tablet: 1 tab(s) orally 3 times a day (22 Dec 2020 22:59)  metFORMIN 500 mg oral tablet, extended release: 1 tab(s) orally once a day (22 Dec 2020 22:59)  metoprolol succinate 50 mg oral tablet, extended release: 1 tab(s) orally once a day (22 Dec 2020 22:59)  Multiple Vitamins oral tablet: 1 tab(s) orally once a day (22 Dec 2020 22:59)  omeprazole 20 mg oral delayed release capsule: 1 cap(s) orally once a day (22 Dec 2020 22:59)  oxybutynin 5 mg/24 hours oral tablet, extended release:  (22 Dec 2020 22:59)  potassium chloride 20 mEq oral granule, extended release: 1 tab(s) orally once a day (22 Dec 2020 22:59)  rosuvastatin 5 mg oral tablet: 1 tab(s) orally once a day (22 Dec 2020 22:59)  Vitamin D3 1000 intl units (25 mcg) oral capsule: 1 tab(s) orally once a day (22 Dec 2020 22:59)    Patient is a 80y old  Female who presents with a chief complaint of Acute decompensated HF  right pleural effusion (29 Dec 2020 12:30)    HEALTH ISSUES - PROBLEM Dx:  Cirrhosis of liver with ascites, unspecified hepatic cirrhosis type  Cirrhosis of liver with ascites, unspecified hepatic cirrhosis type    Acute on chronic combined systolic (congestive) and diastolic (congestive) heart failure  Acute on chronic combined systolic (congestive) and diastolic (congestive) heart failure    Type 2 diabetes mellitus without complication, with long-term current use of insulin  Type 2 diabetes mellitus without complication, with long-term current use of insulin    Essential hypertension  Essential hypertension    Pleural effusion  Pleural effusion    Acute on chronic congestive heart failure, unspecified heart failure type  Acute on chronic congestive heart failure, unspecified heart failure type    HPI:  81yo F with pmh of chf, DM, HTN, HLD presented to ED c/o worsening SOB, b/l LE swellings and orthopnea and cough for the past several days. Pt reports to sob even at rest and the swellings in her legs has been causing her difficulty with walking. She saw her PMD who told her that she has fluid in her lung, and sent her to ED. Denies cp, fever, chills, n/v. In the ED found to have elevated BNP 19690, CT chest with large right pleural effusion.  (22 Dec 2020 22:26)    General: No fatigue, no fevers/chills  Respiratory: No dyspnea, no cough, no wheeze  CV: No chest pain, no palpitations  Abd: No nausea  Neuro: No headache, no dizziness  No Known Allergies      Objective:  Vital Signs Last 24 Hrs  T(C): 36.3 (29 Dec 2020 09:31), Max: 36.7 (28 Dec 2020 12:40)  T(F): 97.4 (29 Dec 2020 09:31), Max: 98 (28 Dec 2020 12:40)  HR: 75 (29 Dec 2020 11:53) (75 - 105)  BP: 95/50 (29 Dec 2020 11:53) (93/60 - 113/73)  BP(mean): --  RR: 17 (29 Dec 2020 11:53) (16 - 18)  SpO2: 98% (29 Dec 2020 11:53) (94% - 100%)    Constitutional: A & O x 3  HEENT:   Normal oral mucosa, PERRL, EOMI	  Cardiovascular: Normal S1 S2, No JVD, Systolic murmur III/VI  Respiratory: Lungs clear to auscultation	anteriorly; diminished at bases  Gastrointestinal:  Soft, Non-tender, + BS	  Skin: No rashes, No ecchymoses, No cyanosis  Neurologic: Non-focal  Extremities: Normal range of motion, No clubbing, cyanosis   Vascular: BLLE edema +2 pitting  Right radial cath site: radial band in place, no bleeding/hematoma formation, palpable pulse                            9.7    5.83  )-----------( 131      ( 29 Dec 2020 06:51 )             32.9     12-29    135  |  89<L>  |  24.0<H>  ----------------------------<  223<H>  3.9   |  35.0<H>  |  0.52    Ca    9.1      29 Dec 2020 06:51  Mg     1.8     12-29          80y  Female is now s/p right and left heart catheterization via right radial and right brachial vein approach with Dr. Flores    -Pt is already in-patient  -post cardiac cath orders  -radial  precautions  -bedrest x 1 hour post procedure  -labs in AM  -continue lasix 40mg IVP BID; will likely increase to 80mg IVP BID tomorrow post dye load   -continue single antiplatelet therapy with asa 81mg po daily  -statin therapy; atorvastatin 20mg po daily  -beta blocker; toprol 25mg po daily  -ACE; lisinopril 2.5mg po daily; likely will convert to entresto outpt per Cardio  -follow up outpt in 2 weeks with Cardiologist  -[facility] following during hospitalization   -cardiac rehab info provided/referral and communication to cardiac rehab completed  -Management per Hospitalist   -Likely will have CTS consult for TAVR eval during hospitalization  -pt does have social concerns; live home alone by herself and walks with a walker; recommend social work and PT/OT consult if surgical plan is developed                                                                              Department of Cardiology                                                                  [facility]/[place] [address]                                                            Telephone: [phone]. Fax:[phone]      Post- Procedure Note: Cardiac Catheterization     Narrative:  80y  Female is now s/p right and left heart catheterization via right radial and right brachial vein approach with Dr. Flores  Right radial precautions  No cardiac intervention; medical mgmt  dLAD 75%  diagonal 75%  mLAD moderate disease  peak-peak gradient 125mm  FLOYD 0.34  PAWP 29  Contrast used: omnipaque 42ml  Heparin used: 3,000 units  Will need CT consult for TAVR evaluation         PAST MEDICAL & SURGICAL HISTORY:  Vertigo    Hypertension    DM (diabetes mellitus)    Acute CHF (congestive heart failure)    S/P cholecystectomy    S/P left knee surgery      FAMILY HISTORY:  FH: hypertension      Home Medications:  glipiZIDE 10 mg oral tablet: 1 tab(s) orally once a day (22 Dec 2020 22:59)  Lasix 40 mg oral tablet: 1 tab(s) orally once a day (22 Dec 2020 22:59)  lisinopril 2.5 mg oral tablet: 1 tab(s) orally once a day (22 Dec 2020 22:59)  meclizine 12.5 mg oral tablet: 1 tab(s) orally 3 times a day (22 Dec 2020 22:59)  metFORMIN 500 mg oral tablet, extended release: 1 tab(s) orally once a day (22 Dec 2020 22:59)  metoprolol succinate 50 mg oral tablet, extended release: 1 tab(s) orally once a day (22 Dec 2020 22:59)  Multiple Vitamins oral tablet: 1 tab(s) orally once a day (22 Dec 2020 22:59)  omeprazole 20 mg oral delayed release capsule: 1 cap(s) orally once a day (22 Dec 2020 22:59)  oxybutynin 5 mg/24 hours oral tablet, extended release:  (22 Dec 2020 22:59)  potassium chloride 20 mEq oral granule, extended release: 1 tab(s) orally once a day (22 Dec 2020 22:59)  rosuvastatin 5 mg oral tablet: 1 tab(s) orally once a day (22 Dec 2020 22:59)  Vitamin D3 1000 intl units (25 mcg) oral capsule: 1 tab(s) orally once a day (22 Dec 2020 22:59)    Patient is a 80y old  Female who presents with a chief complaint of Acute decompensated HF  right pleural effusion (29 Dec 2020 12:30)    HEALTH ISSUES - PROBLEM Dx:  Cirrhosis of liver with ascites, unspecified hepatic cirrhosis type  Cirrhosis of liver with ascites, unspecified hepatic cirrhosis type    Acute on chronic combined systolic (congestive) and diastolic (congestive) heart failure  Acute on chronic combined systolic (congestive) and diastolic (congestive) heart failure    Type 2 diabetes mellitus without complication, with long-term current use of insulin  Type 2 diabetes mellitus without complication, with long-term current use of insulin    Essential hypertension  Essential hypertension    Pleural effusion  Pleural effusion    Acute on chronic congestive heart failure, unspecified heart failure type  Acute on chronic congestive heart failure, unspecified heart failure type    HPI:  81yo F with pmh of chf, DM, HTN, HLD presented to ED c/o worsening SOB, b/l LE swellings and orthopnea and cough for the past several days. Pt reports to sob even at rest and the swellings in her legs has been causing her difficulty with walking. She saw her PMD who told her that she has fluid in her lung, and sent her to ED. Denies cp, fever, chills, n/v. In the ED found to have elevated BNP 19690, CT chest with large right pleural effusion.  (22 Dec 2020 22:26)    General: No fatigue, no fevers/chills  Respiratory: No dyspnea, no cough, no wheeze  CV: No chest pain, no palpitations  Abd: No nausea  Neuro: No headache, no dizziness  No Known Allergies      Objective:  Vital Signs Last 24 Hrs  T(C): 36.3 (29 Dec 2020 09:31), Max: 36.7 (28 Dec 2020 12:40)  T(F): 97.4 (29 Dec 2020 09:31), Max: 98 (28 Dec 2020 12:40)  HR: 75 (29 Dec 2020 11:53) (75 - 105)  BP: 95/50 (29 Dec 2020 11:53) (93/60 - 113/73)  BP(mean): --  RR: 17 (29 Dec 2020 11:53) (16 - 18)  SpO2: 98% (29 Dec 2020 11:53) (94% - 100%)    Constitutional: A & O x 3  HEENT:   Normal oral mucosa, PERRL, EOMI	  Cardiovascular: Normal S1 S2, No JVD, Systolic murmur III/VI  Respiratory: Lungs clear to auscultation	anteriorly; diminished at bases  Gastrointestinal:  Soft, Non-tender, + BS	  Skin: No rashes, No ecchymoses, No cyanosis  Neurologic: Non-focal  Extremities: Normal range of motion, No clubbing, cyanosis   Vascular: BLLE edema +2 pitting  Right radial cath site: radial band in place, no bleeding/hematoma formation, palpable pulse                            9.7    5.83  )-----------( 131      ( 29 Dec 2020 06:51 )             32.9     12-29    135  |  89<L>  |  24.0<H>  ----------------------------<  223<H>  3.9   |  35.0<H>  |  0.52    Ca    9.1      29 Dec 2020 06:51  Mg     1.8     12-29          80y  Female is now s/p right and left heart catheterization via right radial and right brachial vein approach with Dr. Flores    -Pt is already in-patient  -post cardiac cath orders  -radial  precautions  -bedrest x 1 hour post procedure  -labs in AM  -continue lasix 40mg IVP BID; will likely increase to 80mg IVP BID tomorrow post dye load   -continue single antiplatelet therapy with asa 81mg po daily  -statin therapy; atorvastatin 20mg po daily  -beta blocker; toprol 25mg po daily  -ACE; lisinopril 2.5mg po daily; likely will convert to entresto outpt per Cardio  -follow up outpt in 2 weeks with Cardiologist  -[facility] following during hospitalization   -cardiac rehab info provided/referral and communication to cardiac rehab completed  -Management per Hospitalist   -Likely will have CTS consult for TAVR eval during hospitalization  -pt does have social concerns; live home alone by herself and walks with a walker; recommend social work and PT/OT consult if surgical plan is developed  -Discussed plan of care with Dr. Ojeda, Cardiologist

## 2020-12-29 NOTE — CONSULT NOTE ADULT - ASSESSMENT
80y Female with PMH vertigo, HTN, DM, and chronic combined systolic and diastolic heart failure.  Presented to ED 12/22 c/o worsening SOB, b/l LE swellings and orthopnea and cough for several days. Pt reported sob even at rest and the swellings in her legs has been causing her difficulty with walking. She saw her PMD who told her that she has fluid in her lung, and sent her to ED. In the ED found to have elevated BNP 71159. On IV Lasix for diuresis.   Thoracic surgery was consulted after CT chest reveals large right pleural effusion.  A right pigtail was placed and then removed a few days later.  On TTE 12/23 EF 35-40%,  Possible mobile echodensity on MV, Mod to Sev MR, Mod pulm HTN, Mild AI, Severe AS with FLOYD 0.35 and mean gradient of 65.  JOE today showed EF 30%, Mod MR, Mod TR, Severe AS, FLOYD 0.33, mean gradient 58, Mild AI, and a small pericardial effusion.  Called by cardio to eval for Severe AS.

## 2020-12-29 NOTE — CONSULT NOTE ADULT - PROBLEM SELECTOR RECOMMENDATION 9
Severe AS on TTE and JOE.  FLOYD 0.33 Mean gradient 58.  Consulted for surgical evaluation.  Will order preop workup including carotid US, PFT's, labs, UA.  Ambulated 10ft with physical therapy with a rolling walker today.  Recommendation per PT today is home PT vs [facility]. Severe AS on TTE and JOE.  FLOYD 0.33 Mean gradient 58.  Consulted for surgical evaluation.  Will order preop workup including carotid US, PFT's, labs, UA, frailty screening by PT.    Would need cardiac cath.  Ambulated only 10ft with physical therapy with a rolling walker today.  Recommendation per PT today is home PT vs [facility].

## 2020-12-29 NOTE — PROGRESS NOTE ADULT - SUBJECTIVE AND OBJECTIVE BOX
seen for HF, AS    seen this am   no acute complaints.  sore throat. rectal pain (declines exam as going to cath)  ros otherwise negative    MEDICATIONS  (STANDING):  aspirin enteric coated 81 milliGRAM(s) Oral daily  atorvastatin 20 milliGRAM(s) Oral at bedtime  cholecalciferol 1000 Unit(s) Oral daily  dextrose 40% Gel 15 Gram(s) Oral once  dextrose 5%. 1000 milliLiter(s) (50 mL/Hr) IV Continuous <Continuous>  dextrose 5%. 1000 milliLiter(s) (100 mL/Hr) IV Continuous <Continuous>  dextrose 50% Injectable 25 Gram(s) IV Push once  dextrose 50% Injectable 12.5 Gram(s) IV Push once  dextrose 50% Injectable 25 Gram(s) IV Push once  furosemide   Injectable 40 milliGRAM(s) IV Push two times a day  glucagon  Injectable 1 milliGRAM(s) IntraMuscular once  heparin   Injectable 5000 Unit(s) SubCutaneous every 12 hours  influenza   Vaccine 0.5 milliLiter(s) IntraMuscular once  insulin lispro (ADMELOG) corrective regimen sliding scale   SubCutaneous three times a day before meals  lidocaine 1% (Preservative-free) Injectable 1 milliLiter(s) Local Injection once  lisinopril 2.5 milliGRAM(s) Oral daily  metoprolol succinate ER 25 milliGRAM(s) Oral daily  multivitamin 1 Tablet(s) Oral daily  oxybutynin 5 milliGRAM(s) Oral daily  pantoprazole    Tablet 40 milliGRAM(s) Oral before breakfast    MEDICATIONS  (PRN):  acetaminophen   Tablet .. 650 milliGRAM(s) Oral every 6 hours PRN Mild Pain (1 - 3)  benzocaine 15 mG/menthol 3.6 mG (Sugar-Free) Lozenge 1 Lozenge Oral four times a day PRN Sore Throat  hydrocortisone hemorrhoidal Suppository 1 Suppository(s) Rectal daily PRN hemorrhoidal pain  meclizine 12.5 milliGRAM(s) Oral three times a day PRN Dizziness      Allergies    No Known Allergies      Vital Signs Last 24 Hrs  T(C): 36.3 (29 Dec 2020 09:31), Max: 36.7 (28 Dec 2020 12:40)  T(F): 97.4 (29 Dec 2020 09:31), Max: 98 (28 Dec 2020 12:40)  HR: 75 (29 Dec 2020 11:53) (75 - 105)  BP: 95/50 (29 Dec 2020 11:53) (93/60 - 113/73)  BP(mean): --  RR: 17 (29 Dec 2020 11:53) (16 - 18)  SpO2: 98% (29 Dec 2020 11:53) (94% - 100%)    PHYSICAL EXAM:    GENERAL: NAD  CHEST/LUNG: Clear to percussion bilaterally  HEART: Regular rate and rhythm; S1 S2  ABDOMEN: Soft, Nontender, Nondistended; Bowel sounds present  EXTREMITIES:  +1 to 2 edema lower legs (improved)  NERVOUS SYSTEM:  Alert & Oriented X3, Motor Strength 5/5 B/L upper and lower extremities  PSYCH: normal mood, appropriate response.    LABS:                        9.7    5.83  )-----------( 131      ( 29 Dec 2020 06:51 )             32.9     12-29    135  |  89<L>  |  24.0<H>  ----------------------------<  223<H>  3.9   |  35.0<H>  |  0.52    Ca    9.1      29 Dec 2020 06:51  Mg     1.8     12-29            CAPILLARY BLOOD GLUCOSE      POCT Blood Glucose.: 246 mg/dL (28 Dec 2020 17:11)        RADIOLOGY & ADDITIONAL TESTS:

## 2020-12-29 NOTE — PROGRESS NOTE ADULT - ASSESSMENT
81yo F with pmh of chf, DM2, HTN, HLD presented to ED c/o worsening SOB, b/l LE swellings and orthopnea and cough for the past several days. Pt reports to sob even at rest and the swellings in her legs has been causing her difficulty with walking. She saw her PMD who told her that she has fluid in her lung, and sent her to ED. Denies cp, fever, chills, n/v. In the ED found to have elevated BNP 19690, CT chest with large right pleural effusion.     Acute decompensated systolic HF with large rt pleural effusion    s/p chest tube placement and subsequent removal    c/w IV lasix    liver cirrhosis with ascites: likely 2/2 heart disease    see above    Severe AS With hypodensity MV   s/p JOE EF 30% mod MR, mod TR, severe AS but heavily calcified, dilated prox ascending aorta (3.6cm).   cath today    Diabetes   -hold home po meds  -ISS, hypoglycemic protocol, FSG  -A1c 6.7    HTN/HLD  -cont with Lisinopril and metoprolol with holding parameter   -cont with atorvastatin 20mg daily     dizziness: may related to severe AS    negative CT head, prn meclizine.    DVT ppx   -heparin    [facility] DW PT- PT WANTS TO BE FULL CODE INCLUDE INTUBATION, RESUSCITATION . Pt has friend Oliva but pt wants to make her own decision and does not want to involve her in care now.      PT recommending [facility] vs home PT/assist

## 2020-12-29 NOTE — PROGRESS NOTE ADULT - ASSESSMENT
Assessment:  Radha Zhang is an 80 year old woman with past medical history of Hypertension, Diabetes mellitus, HFrEF (LVEF 35-40% in 12/2020), Severe aortic stenosis and moderate-to-severe pulmonary hypertension who presents with increasing dyspnea and leg swelling found to have acute on chronic heart failure exacerbation, also with large right pleural effusion now s/p right chest tube.    TTE with moderate LV dysfunction and wall motion abnormalities which may suggest multi-vessel CAD, also with severe aortic stenosis and moderate to severe mitral regurgitation and calcification of mitral valve.       Recommendations:   [] HFrEF: Patient had right heart catherization today consistent with significantly elevated filling pressures. Will continue Lasix 40 mg IVP BID today, renal function stable. Will likely increase diuresis tomorrow if Cr remains stable (given that patient received contrast load today). Left heart catherization with no obstructive CAD at this time. Continue guideline directed medical therapy: metoprolol succinate 25 mg daily, lisinopril 2.5 mg daily (can consider transition to Entresto as outpatient, if BP allows).   [] Moderate mitral regurgitation: JOE with no obvious valvular vegetation visualized. This may be functional in etiology and due to significant mitral fibrocalcific disease as well. Diuresis as above.   [] Severe aortic stenosis: JOE images reviewed and consistent with critical severe aortic stenosis (peak velocity 4.9 m/s, mean gradient 58 mmHg, FLOYD 0.33 cm^2). Patient does not appear to be candidate for SAVR. Likely candidate for TAVR, denies any pulmonary intrinsic disease but TAVR may still be risky for this patient. Discussed TAVR procedure with patient and she is amenable to hearing more information about this, will consult CT surgery.   [] Continue aspirin, statin     Thank you for the consult. We will continue to follow along.    Joesph Ojeda MD  Cardiology

## 2020-12-29 NOTE — PHYSICAL THERAPY INITIAL EVALUATION ADULT - IMPAIRMENTS CONTRIBUTING TO GAIT DEVIATIONS, PT EVAL
SPO2 decreased to 87% on 3L O2nc during ambulation, recovered to mid 90's with seated rest break, Pt with + MCCLELLAND/decreased strength

## 2020-12-29 NOTE — CONSULT NOTE ADULT - PROBLEM SELECTOR RECOMMENDATION 5
Per CT chest radiology report, there is evidence of hepatic cirrhosis and ascites.  LFT's were normal.  Would need GI consult for child's class staging.

## 2020-12-29 NOTE — CONSULT NOTE ADULT - SUBJECTIVE AND OBJECTIVE BOX
Surgeon: Diomedes    Consult requesting by: Toribio Ojeda    HISTORY OF PRESENT ILLNESS:  80y Female with PMH vertigo, HTN, DM, and chronic combined systolic and diastolic heart failure.  Presented to ED 12/22 c/o worsening SOB, b/l LE swellings and orthopnea and cough for several days. Pt reported sob even at rest and the swellings in her legs has been causing her difficulty with walking. She saw her PMD who told her that she has fluid in her lung, and sent her to ED. In the ED found to have elevated BNP 81017. On IV Lasix for diuresis.   Thoracic surgery was consulted after CT chest reveals large right pleural effusion.  A right pigtail was placed and then removed a few days later.  On TTE 12/23 EF 35-40%,  Possible mobile echodensity on MV, Mod to Sev MR, Mod pulm HTN, Mild AI, Severe AS with FLOYD 0.35 and mean gradient of 65.  JOE today showed EF 30%, Mod MR, Mod TR, Severe AS, FLOYD 0.33, mean gradient 58, Mild AI, and a small pericardial effusion.  Called by cardio to eval for Severe AS.            PAST MEDICAL & SURGICAL HISTORY:  Vertigo    Hypertension    DM (diabetes mellitus)    Acute CHF (congestive heart failure)    S/P cholecystectomy    S/P left knee surgery        MEDICATIONS  (STANDING):  aspirin enteric coated 81 milliGRAM(s) Oral daily  atorvastatin 20 milliGRAM(s) Oral at bedtime  cholecalciferol 1000 Unit(s) Oral daily  dextrose 40% Gel 15 Gram(s) Oral once  dextrose 5%. 1000 milliLiter(s) (50 mL/Hr) IV Continuous <Continuous>  dextrose 5%. 1000 milliLiter(s) (100 mL/Hr) IV Continuous <Continuous>  dextrose 50% Injectable 25 Gram(s) IV Push once  dextrose 50% Injectable 12.5 Gram(s) IV Push once  dextrose 50% Injectable 25 Gram(s) IV Push once  furosemide   Injectable 40 milliGRAM(s) IV Push two times a day  glucagon  Injectable 1 milliGRAM(s) IntraMuscular once  heparin   Injectable 5000 Unit(s) SubCutaneous every 12 hours  influenza   Vaccine 0.5 milliLiter(s) IntraMuscular once  insulin lispro (ADMELOG) corrective regimen sliding scale   SubCutaneous three times a day before meals  lidocaine 1% (Preservative-free) Injectable 1 milliLiter(s) Local Injection once  lisinopril 2.5 milliGRAM(s) Oral daily  metoprolol succinate ER 25 milliGRAM(s) Oral daily  multivitamin 1 Tablet(s) Oral daily  oxybutynin 5 milliGRAM(s) Oral daily  pantoprazole    Tablet 40 milliGRAM(s) Oral before breakfast    MEDICATIONS  (PRN):  acetaminophen   Tablet .. 650 milliGRAM(s) Oral every 6 hours PRN Mild Pain (1 - 3)  benzocaine 15 mG/menthol 3.6 mG (Sugar-Free) Lozenge 1 Lozenge Oral four times a day PRN Sore Throat  hydrocortisone hemorrhoidal Suppository 1 Suppository(s) Rectal daily PRN hemorrhoidal pain  meclizine 12.5 milliGRAM(s) Oral three times a day PRN Dizziness    Antiplatelet therapy:                           Last dose/amt:    Allergies    No Known Allergies    Intolerances        SOCIAL HISTORY:  Smoker: [ ] Yes  [x ] No        PACK YEARS:                         WHEN QUIT?  ETOH use: [ ] Yes  [ x] No              FREQUENCY / QUANTITY:  Ilicit Drug use:  [ ] Yes  [x ] No  Occupation: none  Live with: family   Assisted device use: none    FAMILY HISTORY:  FH: hypertension    Review of Systems  CONSTITUTIONAL:  Fevers[ ] chills[ ] sweats[ ] fatigue[ ] weight loss[ ] weight gain [ ]                                     NEGATIVE [ ]   NEURO:  parathesias[ ] seizures [ ]  syncope [ ]  confusion [ ]                                                                                NEGATIVE[ ]   EYES: glasses[ ]  blurry vision[ ]  discharge[ ] pain[ ] glaucoma [ ]                                                                          NEGATIVE[ ]   ENMT:  difficulty hearing [ ]  vertigo[ ]  dysphagia[ ] epistaxis[ ] recent dental work [ ]                                    NEGATIVE[ ]   CV:  chest pain[ ] palpitations[ ] MCCLELLAND [ ] diaphoresis [ ]                                                                                           NEGATIVE[ ]   RESPIRATORY:  wheezing[ ] SOB[ ] cough [ ] sputum[ ] hemoptysis[ ]                                                                  NEGATIVE[ ]   GI:  nausea[ ]  vommiting [ ]  diarrhea[ ] constipation [ ] melena [ ]                                                                      NEGATIVE[ ]   : hematuria[ ]  dysuria[ ] urgency[ ] incontinence[ ]                                                                                            NEGATIVE[ ]   MUSKULOSKELETAL:  arthritis[ ]  joint swelling [ ] muscle weakness [ ]                                                                NEGATIVE[ ]   SKIN/BREAST:  rash[ ] itching [ ]  hair loss[ ] masses[ ]                                                                                              NEGATIVE[ ]   PSYCH:  dementia [ ] depresion [ ] anxiety[ ]                                                                                                               NEGATIVE[ ]   HEME/LYMPH:  bruises easily[ ] enlarged lymph nodes[ ] tender lymph nodes[ ]                                               NEGATIVE[ ]   ENDOCRINE:  cold intolerance[ ] heat intolerance[ ] polydipsia[ ]                                                                          NEGATIVE[ ]     PHYSICAL EXAM  Vital Signs Last 24 Hrs  T(C): 37.4 (29 Dec 2020 15:21), Max: 37.4 (29 Dec 2020 15:21)  T(F): 99.3 (29 Dec 2020 15:21), Max: 99.3 (29 Dec 2020 15:21)  HR: 75 (29 Dec 2020 15:21) (70 - 105)  BP: 119/70 (29 Dec 2020 15:21) (81/49 - 119/70)  BP(mean): --  RR: 18 (29 Dec 2020 15:21) (16 - 19)  SpO2: 96% (29 Dec 2020 15:21) (94% - 100%)    CONSTITUTIONAL:                                                                          WNL[ ]   Neuro: WNL[ ] Normal exam oriented to person/place & time with no focal motor or sensory  deficits. Other                     Eyes: WNL[ ]   Normal exam of conjunctiva & lids, pupils equally reactive. Other     ENT: WNL[ ]    Normal exam of nasal/oral mucosa with absence of cyanosis. Other  Neck: WNL[ ]  Normal exam of jugular veins, trachea & thyroid. Other  Chest: WNL[ ] Normal lung exam with good air movement absence of wheezes, rales, or rhonchi: Other                                                                                CV:  Auscultation: normal [ ] S3[ ] S4[ ] Irregular [ ] Rub[ ] Clicks[ ]    Murmurs none:[ ]systolic [ ]  diastolic [ ] holosystolic [ ]  Carotids: No Bruits[ ] Other                 Abdominal Aorta: normal [ ] nonpalpable[ ]Other                                                                                      GI:           WNL[ ] Normal exam of abdomen, liver & spleen with no noted masses or tenderness. Other                                                                                                        Extremities: WNL[ ] Normal no evidence of cyanosis or deformity Edema: none[ ]trace[ ]1+[ ]2+[ ]3+[ ]4+[ ]  Lower Extremity Pulses: Right[ ] Left[ ]Varicosities[ ]  SKIN :WNL[ ] Normal exam to inspection & palation. Other:                                                          LABS:                        9.7    5.83  )-----------( 131      ( 29 Dec 2020 06:51 )             32.9     12-29    135  |  89<L>  |  24.0<H>  ----------------------------<  223<H>  3.9   |  35.0<H>  |  0.52    Ca    9.1      29 Dec 2020 06:51  Mg     1.8     12-29                  Cardiac Cath: NA      < from: TTE Echo Complete w/o Contrast w/ Doppler (12.23.20 @ 16:48) >  Summary:   1. Left ventricular ejection fraction, by visual estimation, is 35 to 40%.   2. Moderately decreased global left ventricular systolic function.   3. Entireseptum is abnormal as described above.   4. Elevated left atrial and left ventricular end-diastolic pressures.   5. Severely increased LV wall thickness.   6. Spectral Doppler shows pseudonormal pattern of left ventricular myocardial filling (Grade II diastolic dysfunction).   7. Mild thickening and calcification of the anterior and posterior mitral valve leaflets.   8. Moderate mitral annular calcification.   9. A mobile echodensity on the mitral valve. Clinical correlations is recommended. Consider JOE if clinically indicated.  10. Moderate to severe mitral valve regurgitation.  11. Estimated pulmonary artery systolic pressure is 58.1 mmHg assuming a right atrial pressure of 8 mmHg, which is consistent with moderate pulmonary hypertension.  12. Mild aortic regurgitation.  13. Peak transaortic gradient equals 95.5 mmHg, mean transaortic gradient equals 65.0 mmHg, the calculated aortic valve area equals 0.35 cm² by the continuity equation consistent with severe aortic stenosis.  14. Dilatation of the ascending aorta.  15. Endocardial visualization was enhanced with intravenous echo contrast.  16. Case d/w Dr. Maddox at the time of the conclusion of the study.    MD Mynor Electronically signed on 12/23/2020 at 10:18:24 PM    < end of copied text >    < from: JOE Echo Doppler (12.28.20 @ 09:21) >  Summary:   1. Moderately decreased global left ventricular systolic function.   2. Left ventricular ejection fraction, by visual estimation, is 30%.   3. Normal left ventricular internal cavity size.   4. Left ventricle chordal calcification.   5. Moderately reduced RV systolic function.   6. Left atrial enlargement.   7. No left atrial appendage thrombus and normalleft atrial appendage velocities.   8. Mildly enlarged right atrium.   9. Color flow doppler and intravenous injection of agitated saline demonstrates the presence of an intact intra atrial septum.  10. The mitral valve leaflets appear moderately thickened and calcified. 3-D evaluation of mitral valve leaflets was performed. No independent, mobile valvular vegetation visualized on mitral valve leaflets. Moderate mitral valve regurgitation.  11. The tricuspid valve leaflets appear mildly degenerative. Moderate tricuspid valve regurgitation. No independent, mobile valvular vegetation visualized on tricuspid valve leaflets.  12. 3-D evaluation of aortic valve was performed. The aortic valve leaflets are heavily calcified with reduced leaflet excursion and opening. Severe aortic valve stenosis (peak velocity 4.9 m/s, mean gradient 58 mmHg, calculated FLOYD by continuity equation 0.33 cm^2).  13. Mild aortic regurgitation.  14. Dilated proximal ascending aorta (3.6 cm; 2.1 cm/m^2). Mild intimal thickening visualized in the descending aorta.  15. Small pericardial effusion.  16. Recommend clinical correlation with the above findings.    Joesph Ojeda MD Electronically signed on 12/28/2020 at 5:22:31 PM    < end of copied text >                           Surgeon: Diomedes    Consult requesting by: Toribio Ojeda    HISTORY OF PRESENT ILLNESS:  80y Female with PMH vertigo, HTN, DM, and chronic combined systolic and diastolic heart failure.  Presented to ED 12/22 c/o worsening SOB, b/l LE swellings and orthopnea and cough for several days. Pt reported sob even at rest and the swellings in her legs has been causing her difficulty with walking. She saw her PMD who told her that she has fluid in her lung, and sent her to ED. In the ED found to have elevated BNP 82538. On IV Lasix for diuresis.   Thoracic surgery was consulted after CT chest reveals large right pleural effusion.  A right pigtail was placed and then removed a few days later.  On TTE 12/23 EF 35-40%,  Possible mobile echodensity on MV, Mod to Sev MR, Mod pulm HTN, Mild AI, Severe AS with FLOYD 0.35 and mean gradient of 65.  JOE today showed EF 30%, Mod MR, Mod TR, Severe AS, FLOYD 0.33, mean gradient 58, Mild AI, and a small pericardial effusion.  Called by cardio to eval for Severe AS.      Pt sitting up in chair eating dinner.  Denies CP or SOB.  NAD noted.      PAST MEDICAL & SURGICAL HISTORY:  Vertigo    Hypertension    DM (diabetes mellitus)    Acute CHF (congestive heart failure)    S/P cholecystectomy    S/P left knee surgery        MEDICATIONS  (STANDING):  aspirin enteric coated 81 milliGRAM(s) Oral daily  atorvastatin 20 milliGRAM(s) Oral at bedtime  cholecalciferol 1000 Unit(s) Oral daily  dextrose 40% Gel 15 Gram(s) Oral once  dextrose 5%. 1000 milliLiter(s) (50 mL/Hr) IV Continuous <Continuous>  dextrose 5%. 1000 milliLiter(s) (100 mL/Hr) IV Continuous <Continuous>  dextrose 50% Injectable 25 Gram(s) IV Push once  dextrose 50% Injectable 12.5 Gram(s) IV Push once  dextrose 50% Injectable 25 Gram(s) IV Push once  furosemide   Injectable 40 milliGRAM(s) IV Push two times a day  glucagon  Injectable 1 milliGRAM(s) IntraMuscular once  heparin   Injectable 5000 Unit(s) SubCutaneous every 12 hours  influenza   Vaccine 0.5 milliLiter(s) IntraMuscular once  insulin lispro (ADMELOG) corrective regimen sliding scale   SubCutaneous three times a day before meals  lidocaine 1% (Preservative-free) Injectable 1 milliLiter(s) Local Injection once  lisinopril 2.5 milliGRAM(s) Oral daily  metoprolol succinate ER 25 milliGRAM(s) Oral daily  multivitamin 1 Tablet(s) Oral daily  oxybutynin 5 milliGRAM(s) Oral daily  pantoprazole    Tablet 40 milliGRAM(s) Oral before breakfast    MEDICATIONS  (PRN):  acetaminophen   Tablet .. 650 milliGRAM(s) Oral every 6 hours PRN Mild Pain (1 - 3)  benzocaine 15 mG/menthol 3.6 mG (Sugar-Free) Lozenge 1 Lozenge Oral four times a day PRN Sore Throat  hydrocortisone hemorrhoidal Suppository 1 Suppository(s) Rectal daily PRN hemorrhoidal pain  meclizine 12.5 milliGRAM(s) Oral three times a day PRN Dizziness    Antiplatelet therapy:                           Last dose/amt:    Allergies    No Known Allergies    Intolerances        SOCIAL HISTORY:  Smoker: [ ] Yes  [x ] No        PACK YEARS:                         WHEN QUIT?  ETOH use: [ ] Yes  [ x] No              FREQUENCY / QUANTITY:  Ilicit Drug use:  [ ] Yes  [x ] No  Occupation: none  Live with: family   Assisted device use: none    FAMILY HISTORY:  FH: hypertension    Review of Systems  CONSTITUTIONAL:  Fevers[ ] chills[ ] sweats[ ] fatigue[ ] weight loss[ ] weight gain [x ]                                     NEGATIVE [ ]   NEURO:  parathesias[ ] seizures [ ]  syncope [ ]  confusion [ ]                                                                                NEGATIVE[x ]   EYES: glasses[x ]  blurry vision[ ]  discharge[ ] pain[ ] glaucoma [ ]                                                                          NEGATIVE[ ]   ENMT:  difficulty hearing [x ]  vertigo[ ]  dysphagia[ ] epistaxis[ ] recent dental work [ ]                                    NEGATIVE[ ]   CV:  chest pain[ ] palpitations[ ] MCCLELLAND [ ] diaphoresis [ ]                                                                                           NEGATIVE[ ]   RESPIRATORY:  wheezing[ ] SOB[x ] cough [ ] sputum[ ] hemoptysis[ ]                                                                  NEGATIVE[ ]   GI:  nausea[ ]  vommiting [ ]  diarrhea[ ] constipation [ ] melena [ ]                                                                      NEGATIVE[x ]   : hematuria[ ]  dysuria[ ] urgency[ ] incontinence[ ]                                                                                            NEGATIVE[x ]   MUSKULOSKELETAL:  arthritis[ ]  joint swelling [ ] muscle weakness [ ]                                                                NEGATIVE[x ]   SKIN/BREAST:  rash[ ] itching [ ]  hair loss[ ] masses[ ]                                                                                              NEGATIVE[x ]   PSYCH:  dementia [ ] depresion [ ] anxiety[ ]                                                                                                               NEGATIVE[x ]   HEME/LYMPH:  bruises easily[ ] enlarged lymph nodes[ ] tender lymph nodes[ ]                                               NEGATIVE[x ]   ENDOCRINE:  cold intolerance[ ] heat intolerance[ ] polydipsia[ ]                                                                          NEGATIVE[x]     PHYSICAL EXAM  Vital Signs Last 24 Hrs  T(C): 37.4 (29 Dec 2020 15:21), Max: 37.4 (29 Dec 2020 15:21)  T(F): 99.3 (29 Dec 2020 15:21), Max: 99.3 (29 Dec 2020 15:21)  HR: 75 (29 Dec 2020 15:21) (70 - 105)  BP: 119/70 (29 Dec 2020 15:21) (81/49 - 119/70)  BP(mean): --  RR: 18 (29 Dec 2020 15:21) (16 - 19)  SpO2: 96% (29 Dec 2020 15:21) (94% - 100%)    CONSTITUTIONAL:                                                                           Neuro: WNL[x ] Normal exam oriented to person/place & time with no focal motor or sensory  deficits. Other                     Eyes: WNL[x ]   Normal exam of conjunctiva & lids, pupils equally reactive. Other     ENT: WNL[x ]    Normal exam of nasal/oral mucosa with absence of cyanosis. Other  Neck: WNL[x ]  Normal exam of jugular veins, trachea & thyroid. Other  Chest: WNL[ ] Normal lung exam with good air movement absence of wheezes, rales, or rhonchi: Other   Diminished with fine base crackles.                                                                             CV:  Auscultation: normal [x ] S3[ ] S4[ ] Irregular [ ] Rub[ ] Clicks[ ]    Murmurs none:[ ]systolic [ ]  diastolic [x ] holosystolic [ ]  Carotids: No Bruits[x ] Other                 Abdominal Aorta: normal [x ] nonpalpable[ ]Other                                                                                      GI:           WNL[x ] Normal exam of abdomen, liver & spleen with no noted masses or tenderness. Other                                                                                                        Extremities: WNL[ ] Normal no evidence of cyanosis or deformity Edema: none[ ]trace[ ]1+[ ]2+[x ]3+[ ]4+[ ]  Lower Extremity Pulses: Right[pp ] Left[pp ]Varicosities[ +]  SKIN :WNL[- ] Normal exam to inspection & palpation. Other:                                                          LABS:                        9.7    5.83  )-----------( 131      ( 29 Dec 2020 06:51 )             32.9     12-29    135  |  89<L>  |  24.0<H>  ----------------------------<  223<H>  3.9   |  35.0<H>  |  0.52    Ca    9.1      29 Dec 2020 06:51  Mg     1.8     12-29        Cardiac Cath: NA      < from: TTE Echo Complete w/o Contrast w/ Doppler (12.23.20 @ 16:48) >  Summary:   1. Left ventricular ejection fraction, by visual estimation, is 35 to 40%.   2. Moderately decreased global left ventricular systolic function.   3. Entireseptum is abnormal as described above.   4. Elevated left atrial and left ventricular end-diastolic pressures.   5. Severely increased LV wall thickness.   6. Spectral Doppler shows pseudonormal pattern of left ventricular myocardial filling (Grade II diastolic dysfunction).   7. Mild thickening and calcification of the anterior and posterior mitral valve leaflets.   8. Moderate mitral annular calcification.   9. A mobile echodensity on the mitral valve. Clinical correlations is recommended. Consider JOE if clinically indicated.  10. Moderate to severe mitral valve regurgitation.  11. Estimated pulmonary artery systolic pressure is 58.1 mmHg assuming a right atrial pressure of 8 mmHg, which is consistent with moderate pulmonary hypertension.  12. Mild aortic regurgitation.  13. Peak transaortic gradient equals 95.5 mmHg, mean transaortic gradient equals 65.0 mmHg, the calculated aortic valve area equals 0.35 cm² by the continuity equation consistent with severe aortic stenosis.  14. Dilatation of the ascending aorta.  15. Endocardial visualization was enhanced with intravenous echo contrast.  16. Case d/w Dr. Maddox at the time of the conclusion of the study.    MD Mynor Electronically signed on 12/23/2020 at 10:18:24 PM    < end of copied text >    < from: JOE Echo Doppler (12.28.20 @ 09:21) >  Summary:   1. Moderately decreased global left ventricular systolic function.   2. Left ventricular ejection fraction, by visual estimation, is 30%.   3. Normal left ventricular internal cavity size.   4. Left ventricle chordal calcification.   5. Moderately reduced RV systolic function.   6. Left atrial enlargement.   7. No left atrial appendage thrombus and normalleft atrial appendage velocities.   8. Mildly enlarged right atrium.   9. Color flow doppler and intravenous injection of agitated saline demonstrates the presence of an intact intra atrial septum.  10. The mitral valve leaflets appear moderately thickened and calcified. 3-D evaluation of mitral valve leaflets was performed. No independent, mobile valvular vegetation visualized on mitral valve leaflets. Moderate mitral valve regurgitation.  11. The tricuspid valve leaflets appear mildly degenerative. Moderate tricuspid valve regurgitation. No independent, mobile valvular vegetation visualized on tricuspid valve leaflets.  12. 3-D evaluation of aortic valve was performed. The aortic valve leaflets are heavily calcified with reduced leaflet excursion and opening. Severe aortic valve stenosis (peak velocity 4.9 m/s, mean gradient 58 mmHg, calculated FLYOD by continuity equation 0.33 cm^2).  13. Mild aortic regurgitation.  14. Dilated proximal ascending aorta (3.6 cm; 2.1 cm/m^2). Mild intimal thickening visualized in the descending aorta.  15. Small pericardial effusion.  16. Recommend clinical correlation with the above findings.    Joesph Ojeda MD Electronically signed on 12/28/2020 at 5:22:31 PM    < end of copied text >

## 2020-12-29 NOTE — CONSULT NOTE ADULT - PROBLEM SELECTOR PROBLEM 4
Acute on chronic combined systolic (congestive) and diastolic (congestive) heart failure
Type 2 diabetes mellitus without complication, with long-term current use of insulin

## 2020-12-29 NOTE — CONSULT NOTE ADULT - PROBLEM SELECTOR RECOMMENDATION 4
Treatment per primary team and cardio.  Currently On IV BID Lasix for diuresis.
Cont. care as primary team

## 2020-12-29 NOTE — CONSULT NOTE ADULT - PROBLEM SELECTOR RECOMMENDATION 3
Moderate TR on JOE.
Continue Toprol XL and Lisinopril  Lasix 4o mg BID  DASH diet  Cont. care as primary team

## 2020-12-29 NOTE — PROGRESS NOTE ADULT - SUBJECTIVE AND OBJECTIVE BOX
Department of Cardiology                                                                  [facility]/[place] [address] [address] [facility]                                                            Telephone: [phone]. Fax:[phone]    Pre- Procedure Note: Cardiac Catheterization      Narrative:  79 y/o F, never smoker, with a PMHx of HTN, HLD, DM (a1c 6. 7), HFrEF (30%), severe pHTN (PAS 58mmHg), severe AS (mean gradient 65, peak gradient 95, FLOYD 0.35cm), hepatic cirrhosis, R sided pleural effusion subsequent pigtail placement which has since been removed; 850cc. She was following with her PMD (Dr. Marco Antonio Mercado) outpt whom recommended her to come to the ED.   Of note she had a mobile echodensity which was noted on mitral valve via JOE and severe AS likely will require intervention.   Her symptoms have been progressive with worsening edema and shortness of breath.  She presents now in anticipation of a right and left heart cardiac catheterization with Dr. Flores to evaluate for obstructive disease.    	  MEDICATIONS:  furosemide   Injectable 40 milliGRAM(s) IV Push two times a day  lisinopril 2.5 milliGRAM(s) Oral daily  metoprolol succinate ER 25 milliGRAM(s) Oral daily  acetaminophen   Tablet .. 650 milliGRAM(s) Oral every 6 hours PRN  meclizine 12.5 milliGRAM(s) Oral three times a day PRN  pantoprazole    Tablet 40 milliGRAM(s) Oral before breakfast  atorvastatin 20 milliGRAM(s) Oral at bedtime  dextrose 40% Gel 15 Gram(s) Oral once  dextrose 50% Injectable 25 Gram(s) IV Push once  dextrose 50% Injectable 12.5 Gram(s) IV Push once  dextrose 50% Injectable 25 Gram(s) IV Push once  glucagon  Injectable 1 milliGRAM(s) IntraMuscular once  insulin lispro (ADMELOG) corrective regimen sliding scale   SubCutaneous three times a day before meals  aspirin enteric coated 81 milliGRAM(s) Oral daily  benzocaine 15 mG/menthol 3.6 mG (Sugar-Free) Lozenge 1 Lozenge Oral four times a day PRN  cholecalciferol 1000 Unit(s) Oral daily  dextrose 5%. 1000 milliLiter(s) IV Continuous <Continuous>  dextrose 5%. 1000 milliLiter(s) IV Continuous <Continuous>  heparin   Injectable 5000 Unit(s) SubCutaneous every 12 hours  hydrocortisone hemorrhoidal Suppository 1 Suppository(s) Rectal daily PRN  influenza   Vaccine 0.5 milliLiter(s) IntraMuscular once  multivitamin 1 Tablet(s) Oral daily  oxybutynin 5 milliGRAM(s) Oral daily      ASA: 3  Mallampati: 2  Bleeding Risk: 5.6%  Creatinine: 0.52  GFR: 90    PHYSICAL EXAM:  T(C): 36.3 (20 @ 09:31), Max: 36.7 (20 @ 12:40)  HR: 95 (20 @ 09:31) (78 - 105)  BP: 101/64 (20 @ 09:31) (93/60 - 113/73)  RR: 16 (20 @ 09:31) (16 - 18)  SpO2: 100% (20 @ 09:31) (94% - 100%)    I&O's Summary    28 Dec 2020 07:01  -  29 Dec 2020 07:00  --------------------------------------------------------  IN: 480 mL / OUT: 1100 mL / NET: -620 mL      Daily     Daily Weight in k.9 (29 Dec 2020 04:45)    Constitutional: A & O x 3  HEENT:   Normal oral mucosa, PERRL, EOMI	  Cardiovascular: Normal S1 S2, No JVD, Systolic murmur III/VI  Respiratory: Lungs clear to auscultation	anteriorly; diminished at bases  Gastrointestinal:  Soft, Non-tender, + BS	  Skin: No rashes, No ecchymoses, No cyanosis  Neurologic: Non-focal  Extremities: Normal range of motion, No clubbing, cyanosis   Vascular: BLLE edema +2 pitting    DIAGNOSTIC TESTING:  [ ] Echocardiogram:    < from: JOE Echo Doppler (20 @ 09:21) >  MR Volume: 6.08 ml MR Flow Rate: 20.98 ml/s MR EROA: 3.25 mm²    Aortic Valve: AoV Max Indra: 4.87 m/s AoV Peak P.9 mmHg AoV Mean P.0 mmHg    LVOT Vmax: 0.59 m/s LVOT VTI: 0.136 m LVOT Diameter:    AoV Area, Vmax:  AoV Area, VTI:  AoV Area, Vmn:  Ao VTI: 1.330  Tricuspid Valve and PA/RV Systolic Pressure: TR Max Velocity: 2.99 m/s RA Pressure:  RVSP/PASP:        PROCEDURE: After discussion of the risks and benefits of the JOE, an informed consent was obtained by the cardiologist. Intravenous sedation was performed by anesthesia. Images were obtained with the patient in a left lateral decubitus position. The patient's vital signs; including heart rate, blood pressure, and oxygen saturation; remained stable throughout the procedure. The patient tolerated the procedure well and without complications.    PHYSICIAN INTERPRETATION:  Left Ventricle: The left ventricular internal cavity size is normal.  Global LV systolic function was moderately decreased. Left ventricular ejection fraction, by visual estimation, is 30%. Left ventricle chordal calcification.  Right Ventricle: The right ventricular size is normal. RV systolic function is moderately reduced.  Left Atrium: Left atrial enlargement. No left atrial appendage thrombus is seen and normal left atrial appendagevelocities. Color flow doppler and intravenous injection of agitated saline demonstrates the presence of an intact intra atrial septum.  Right Atrium: Mildly enlarged right atrium.  Pericardium: A small pericardial effusion is present. The pericardial effusion is globally located around the entire heart.  Mitral Valve: The mitral valve leaflets appear moderately thickened and calcified. 3-D evaluation of mitral valve leaflets was performed. No independent, mobile valvular vegetation visualized onmitral valve leaflets. Moderate mitral valve regurgitation.  Tricuspid Valve: The tricuspid valve leaflets appear mildly degenerative. Moderate tricuspid valve regurgitation. No independent, mobile valvular vegetation visualized on tricuspid valve leaflets.  Aortic Valve: Mild aortic valve regurgitation is seen. 3-D evaluation of aortic valve was performed. The aortic valve leaflets are heavily calcified with reduced leaflet excursion and opening. Severe aortic valve stenosis (peak velocity 4.9 m/s, mean gradient 58 mmHg, calculated FLOYD by continuity equation 0.33 cm^2).  Pulmonic Valve: The pulmonic valve is normal. Trace pulmonic valve regurgitation.  Aorta: Dilated proximal ascending aorta (3.6 cm; 2.1 cm/m^2). Mild intimal thickening visualized in the descending aorta.  Shunts: Agitated saline contrast was given intravenously to evaluate for intracardiac shunting.      Summary:   1. Moderately decreased global left ventricular systolic function.   2. Left ventricular ejection fraction, by visual estimation, is 30%.   3. Normal left ventricular internal cavity size.   4. Left ventricle chordal calcification.   5. Moderately reduced RV systolic function.   6. Left atrial enlargement.   7. No left atrial appendage thrombus and normalleft atrial appendage velocities.   8. Mildly enlarged right atrium.   9. Color flow doppler and intravenous injection of agitated saline demonstrates the presence of an intact intra atrial septum.  10. The mitral valve leaflets appear moderately thickened and calcified. 3-D evaluation of mitral valve leaflets was performed. No independent, mobile valvular vegetation visualized on mitral valve leaflets. Moderate mitral valve regurgitation.  11. The tricuspid valve leaflets appear mildly degenerative. Moderate tricuspid valve regurgitation. No independent, mobile valvular vegetation visualized on tricuspid valve leaflets.  12. 3-D evaluation of aortic valve was performed. The aortic valve leaflets are heavily calcified with reduced leaflet excursion and opening. Severe aortic valve stenosis (peak velocity 4.9 m/s, mean gradient 58 mmHg, calculated FLOYD by continuity equation 0.33 cm^2).  13. Mild aortic regurgitation.  14. Dilated proximal ascending aorta (3.6 cm; 2.1 cm/m^2). Mild intimal thickening visualized in the descending aorta.  15. Small pericardial effusion.  16. Recommend clinical correlation with the above findings.    LABS:	 	                        9.7    5.83  )-----------( 131      ( 29 Dec 2020 06:51 )             32.9         135  |  89<L>  |  24.0<H>  ----------------------------<  223<H>  3.9   |  35.0<H>  |  0.52    Ca    9.1      29 Dec 2020 06:51  Phos  3.3     12-27  Mg     1.8           proBNP: Serum Pro-Brain Natriuretic Peptide: 70226 pg/mL ( @ 06:51)        ASSESSMENT:79 y/o F, never smoker, with a PMHx of HTN, HLD, DM (a1c 6. 7), HFrEF (30%), severe pHTN, severe AS (mean gradient 65, peak gradient 95, FLOYD 0.35cm), hepatic cirrhosis, R sided pleural effusion subsequent pigtail placement, now here for R&LHC with Dr. Flores      -consent obtained per Dr. Flores  -procedure discussed with patient; risks and benefits explained, questions answered  -labs and ECG reviewed  -asa 81mg po pre cath  -Mgmt per Hospitalist  -will discuss plan post procedure

## 2020-12-29 NOTE — CONSULT NOTE ADULT - ATTENDING COMMENTS
Above H& P reviewed and independently verified. 80 year old presented to ED with CHF, and had large right pleural effusion was drained. Echo showed severe AS, along with moderate TR and MR. She has elevated right sided pressure on right heart cath. She also needs evaluation for liver cirrhosis. Given her severe AS, and advanced age abd frailty, she is a candidate for TAVR. She will require TAVR CT, and scheduling for TAVR.  This was discussed with her in detail, and all questions were answered.

## 2020-12-29 NOTE — CONSULT NOTE ADULT - PROBLEM SELECTOR RECOMMENDATION 6
A1C 6.7.  Fingersticks currently not well controlled.  Hyperglycemia 200's.  Recommend insulin adjustment and will need an endocrine consult.    To be discussed with Dr. Hercules.  Further recommendations to follow. A1C 6.7.  Fingersticks currently not well controlled.  Hyperglycemia 200's.  Recommend insulin adjustment and will need an endocrine consult.    Discussed with Dr. Hercules.  Further recommendations to follow.

## 2020-12-30 LAB
ALBUMIN SERPL ELPH-MCNC: 3.1 G/DL — LOW (ref 3.3–5.2)
ALP SERPL-CCNC: 78 U/L — SIGNIFICANT CHANGE UP (ref 40–120)
ALT FLD-CCNC: 6 U/L — SIGNIFICANT CHANGE UP
ANION GAP SERPL CALC-SCNC: 14 MMOL/L — SIGNIFICANT CHANGE UP (ref 5–17)
APTT BLD: 29.7 SEC — SIGNIFICANT CHANGE UP (ref 27.5–35.5)
AST SERPL-CCNC: 14 U/L — SIGNIFICANT CHANGE UP
BILIRUB SERPL-MCNC: 1.4 MG/DL — SIGNIFICANT CHANGE UP (ref 0.4–2)
BLD GP AB SCN SERPL QL: SIGNIFICANT CHANGE UP
BUN SERPL-MCNC: 28 MG/DL — HIGH (ref 8–20)
CALCIUM SERPL-MCNC: 9.2 MG/DL — SIGNIFICANT CHANGE UP (ref 8.6–10.2)
CHLORIDE SERPL-SCNC: 90 MMOL/L — LOW (ref 98–107)
CHOLEST SERPL-MCNC: 107 MG/DL — SIGNIFICANT CHANGE UP
CO2 SERPL-SCNC: 31 MMOL/L — HIGH (ref 22–29)
CREAT SERPL-MCNC: 0.57 MG/DL — SIGNIFICANT CHANGE UP (ref 0.5–1.3)
GLUCOSE BLDC GLUCOMTR-MCNC: 225 MG/DL — HIGH (ref 70–99)
GLUCOSE BLDC GLUCOMTR-MCNC: 232 MG/DL — HIGH (ref 70–99)
GLUCOSE BLDC GLUCOMTR-MCNC: 248 MG/DL — HIGH (ref 70–99)
GLUCOSE BLDC GLUCOMTR-MCNC: 251 MG/DL — HIGH (ref 70–99)
GLUCOSE SERPL-MCNC: 214 MG/DL — HIGH (ref 70–99)
HCT VFR BLD CALC: 31.6 % — LOW (ref 34.5–45)
HDLC SERPL-MCNC: 54 MG/DL — SIGNIFICANT CHANGE UP
HGB BLD-MCNC: 9.5 G/DL — LOW (ref 11.5–15.5)
INR BLD: 1.3 RATIO — HIGH (ref 0.88–1.16)
LIPID PNL WITH DIRECT LDL SERPL: 40 MG/DL — SIGNIFICANT CHANGE UP
MAGNESIUM SERPL-MCNC: 1.6 MG/DL — SIGNIFICANT CHANGE UP (ref 1.6–2.6)
MCHC RBC-ENTMCNC: 26.2 PG — LOW (ref 27–34)
MCHC RBC-ENTMCNC: 30.1 GM/DL — LOW (ref 32–36)
MCV RBC AUTO: 87.1 FL — SIGNIFICANT CHANGE UP (ref 80–100)
MRSA PCR RESULT.: SIGNIFICANT CHANGE UP
NON HDL CHOLESTEROL: 53 MG/DL — SIGNIFICANT CHANGE UP
NT-PROBNP SERPL-SCNC: HIGH PG/ML (ref 0–300)
PHOSPHATE SERPL-MCNC: 3.2 MG/DL — SIGNIFICANT CHANGE UP (ref 2.4–4.7)
PLATELET # BLD AUTO: 135 K/UL — LOW (ref 150–400)
POTASSIUM SERPL-MCNC: 3.4 MMOL/L — LOW (ref 3.5–5.3)
POTASSIUM SERPL-SCNC: 3.4 MMOL/L — LOW (ref 3.5–5.3)
PREALB SERPL-MCNC: 9 MG/DL — LOW (ref 18–38)
PROT SERPL-MCNC: 6.5 G/DL — LOW (ref 6.6–8.7)
PROTHROM AB SERPL-ACNC: 14.9 SEC — HIGH (ref 10.6–13.6)
RBC # BLD: 3.63 M/UL — LOW (ref 3.8–5.2)
RBC # FLD: 17.8 % — HIGH (ref 10.3–14.5)
S AUREUS DNA NOSE QL NAA+PROBE: DETECTED
SODIUM SERPL-SCNC: 135 MMOL/L — SIGNIFICANT CHANGE UP (ref 135–145)
TRIGL SERPL-MCNC: 66 MG/DL — SIGNIFICANT CHANGE UP
TSH SERPL-MCNC: 2.94 UIU/ML — SIGNIFICANT CHANGE UP (ref 0.27–4.2)
WBC # BLD: 5.78 K/UL — SIGNIFICANT CHANGE UP (ref 3.8–10.5)
WBC # FLD AUTO: 5.78 K/UL — SIGNIFICANT CHANGE UP (ref 3.8–10.5)

## 2020-12-30 PROCEDURE — 99232 SBSQ HOSP IP/OBS MODERATE 35: CPT

## 2020-12-30 PROCEDURE — 99222 1ST HOSP IP/OBS MODERATE 55: CPT

## 2020-12-30 PROCEDURE — 76700 US EXAM ABDOM COMPLETE: CPT | Mod: 26

## 2020-12-30 RX ORDER — POTASSIUM CHLORIDE 20 MEQ
40 PACKET (EA) ORAL ONCE
Refills: 0 | Status: COMPLETED | OUTPATIENT
Start: 2020-12-30 | End: 2020-12-30

## 2020-12-30 RX ORDER — MUPIROCIN 20 MG/G
1 OINTMENT TOPICAL
Refills: 0 | Status: DISCONTINUED | OUTPATIENT
Start: 2020-12-30 | End: 2021-01-01

## 2020-12-30 RX ORDER — PHENYLEPHRINE-SHARK LIVER OIL-MINERAL OIL-PETROLATUM RECTAL OINTMENT
1 OINTMENT (GRAM) RECTAL EVERY 6 HOURS
Refills: 0 | Status: DISCONTINUED | OUTPATIENT
Start: 2020-12-30 | End: 2021-01-01

## 2020-12-30 RX ORDER — POTASSIUM CHLORIDE 20 MEQ
40 PACKET (EA) ORAL ONCE
Refills: 0 | Status: DISCONTINUED | OUTPATIENT
Start: 2020-12-30 | End: 2020-12-31

## 2020-12-30 RX ORDER — INSULIN GLARGINE 100 [IU]/ML
15 INJECTION, SOLUTION SUBCUTANEOUS AT BEDTIME
Refills: 0 | Status: DISCONTINUED | OUTPATIENT
Start: 2020-12-30 | End: 2021-01-01

## 2020-12-30 RX ADMIN — INSULIN GLARGINE 15 UNIT(S): 100 INJECTION, SOLUTION SUBCUTANEOUS at 21:46

## 2020-12-30 RX ADMIN — MUPIROCIN 1 APPLICATION(S): 20 OINTMENT TOPICAL at 21:45

## 2020-12-30 RX ADMIN — Medication 650 MILLIGRAM(S): at 01:00

## 2020-12-30 RX ADMIN — LISINOPRIL 2.5 MILLIGRAM(S): 2.5 TABLET ORAL at 05:26

## 2020-12-30 RX ADMIN — HEPARIN SODIUM 5000 UNIT(S): 5000 INJECTION INTRAVENOUS; SUBCUTANEOUS at 05:26

## 2020-12-30 RX ADMIN — ATORVASTATIN CALCIUM 20 MILLIGRAM(S): 80 TABLET, FILM COATED ORAL at 21:44

## 2020-12-30 RX ADMIN — Medication 5 MILLIGRAM(S): at 09:18

## 2020-12-30 RX ADMIN — Medication 40 MILLIEQUIVALENT(S): at 09:30

## 2020-12-30 RX ADMIN — Medication 1 SUPPOSITORY(S): at 09:19

## 2020-12-30 RX ADMIN — Medication 1 TABLET(S): at 09:18

## 2020-12-30 RX ADMIN — Medication 2: at 17:50

## 2020-12-30 RX ADMIN — Medication 1000 UNIT(S): at 09:18

## 2020-12-30 RX ADMIN — PANTOPRAZOLE SODIUM 40 MILLIGRAM(S): 20 TABLET, DELAYED RELEASE ORAL at 05:26

## 2020-12-30 RX ADMIN — Medication 40 MILLIGRAM(S): at 05:26

## 2020-12-30 RX ADMIN — HEPARIN SODIUM 5000 UNIT(S): 5000 INJECTION INTRAVENOUS; SUBCUTANEOUS at 17:50

## 2020-12-30 RX ADMIN — Medication 2: at 08:45

## 2020-12-30 RX ADMIN — PHENYLEPHRINE-SHARK LIVER OIL-MINERAL OIL-PETROLATUM RECTAL OINTMENT 1 APPLICATION(S): at 21:46

## 2020-12-30 RX ADMIN — Medication 650 MILLIGRAM(S): at 00:31

## 2020-12-30 RX ADMIN — Medication 25 MILLIGRAM(S): at 05:26

## 2020-12-30 RX ADMIN — Medication 81 MILLIGRAM(S): at 09:17

## 2020-12-30 NOTE — PROGRESS NOTE ADULT - ASSESSMENT
80y Female with PMH vertigo, HTN, DM, and chronic combined systolic and diastolic heart failure.  Presented to ED 12/22 c/o worsening SOB, b/l LE swellings and orthopnea and cough for several days. Pt reported sob even at rest and the swellings in her legs has been causing her difficulty with walking. She saw her PMD who told her that she has fluid in her lung, and sent her to ED. In the ED found to have elevated BNP 46265. On IV Lasix for diuresis.   Thoracic surgery was consulted after CT chest reveals large right pleural effusion.  A right pigtail was placed and then removed a few days later.  On TTE 12/23 EF 35-40%,  Possible mobile echodensity on MV, Mod to Sev MR, Mod pulm HTN, Mild AI, Severe AS with FLOYD 0.35 and mean gradient of 65.  JOE today showed EF 30%, Mod MR, Mod TR, Severe AS, FLOYD 0.33, mean gradient 58, Mild AI, and a small pericardial effusion.  Called by cardio to eval for Severe AS.

## 2020-12-30 NOTE — CONSULT NOTE ADULT - PROBLEM SELECTOR RECOMMENDATION 9
Most likely multifactorial since patient has an extensive cardiac history, and reports an episode of jaundice as a young female.  Please obtain Hepatitis panel, MASTER, AMA, AFP in AM Most likely multifactorial since patient has an extensive cardiac history, and reports an episode of jaundice as a young female.  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

## 2020-12-30 NOTE — PHYSICAL THERAPY INITIAL EVALUATION ADULT - CRITERIA FOR SKILLED THERAPEUTIC INTERVENTIONS
impairments found/functional limitations in following categories/rehab potential/therapy frequency/predicted duration of therapy intervention/anticipated discharge recommendation
Home with assist/supervision, RW, Home PT vs [facility]/impairments found/anticipated equipment needs at discharge/anticipated discharge recommendation

## 2020-12-30 NOTE — PROGRESS NOTE ADULT - ASSESSMENT
Assessment:  Radha Zhang is an 80 year old woman with past medical history of Hypertension, Diabetes mellitus, HFrEF (LVEF 35-40% in 12/2020), Severe aortic stenosis and moderate-to-severe pulmonary hypertension who presents with increasing dyspnea and leg swelling found to have acute on chronic heart failure exacerbation, also with large right pleural effusion now s/p right chest tube.    TTE with moderate LV dysfunction and wall motion abnormalities which may suggest multi-vessel CAD, also with severe aortic stenosis and moderate to severe mitral regurgitation and calcification of mitral valve.       Recommendations:   [] HFrEF: Patient had right heart catherization consistent with elevated filling pressures, wedge ~ 29. Will continue Lasix 40 mg IVP BID today, renal function stable, likely for 2 more days. Left heart catherization with no new obstructive CAD at this time. Continue guideline directed medical therapy: metoprolol succinate 25 mg daily, lisinopril 2.5 mg daily (can consider transition to Entresto as outpatient, if BP allows).   [] Moderate mitral regurgitation: JOE with no obvious valvular vegetation visualized. This may be functional in etiology and due to significant mitral fibrocalcific disease as well. Diuresis as above.   [] Severe aortic stenosis: JOE images reviewed and consistent with critical severe aortic stenosis (peak velocity 4.9 m/s, mean gradient 58 mmHg, FLOYD 0.33 cm^2). Patient does not appear to be candidate for SAVR. Likely candidate for TAVR, denies any pulmonary intrinsic disease but TAVR may still be risky for this patient. Discussed TAVR procedure with patient, follow-up CT surgery.   [] Continue aspirin, statin     Thank you for the consult. We will continue to follow along.    Joesph Ojeda MD  Cardiology

## 2020-12-30 NOTE — PROGRESS NOTE ADULT - SUBJECTIVE AND OBJECTIVE BOX
ERIKA ORTIZ  [id number]      Chief Complaint: New onset CHF/Right pleural effusion/Severe aortic stenosis    Interval History: The patient reports feeling less dyspnea, improved from admission.     Tele: sinus rhythm       Current meds:   acetaminophen   Tablet .. 650 milliGRAM(s) Oral every 6 hours PRN  aspirin enteric coated 81 milliGRAM(s) Oral daily  atorvastatin 20 milliGRAM(s) Oral at bedtime  benzocaine 15 mG/menthol 3.6 mG (Sugar-Free) Lozenge 1 Lozenge Oral four times a day PRN  cholecalciferol 1000 Unit(s) Oral daily  dextrose 40% Gel 15 Gram(s) Oral once  dextrose 5%. 1000 milliLiter(s) IV Continuous <Continuous>  dextrose 5%. 1000 milliLiter(s) IV Continuous <Continuous>  dextrose 50% Injectable 25 Gram(s) IV Push once  dextrose 50% Injectable 12.5 Gram(s) IV Push once  dextrose 50% Injectable 25 Gram(s) IV Push once  furosemide   Injectable 40 milliGRAM(s) IV Push two times a day  glucagon  Injectable 1 milliGRAM(s) IntraMuscular once  hemorrhoidal Ointment 1 Application(s) Rectal every 6 hours PRN  heparin   Injectable 5000 Unit(s) SubCutaneous every 12 hours  hydrocortisone hemorrhoidal Suppository 1 Suppository(s) Rectal daily PRN  influenza   Vaccine 0.5 milliLiter(s) IntraMuscular once  insulin glargine Injectable (LANTUS) 15 Unit(s) SubCutaneous at bedtime  insulin lispro (ADMELOG) corrective regimen sliding scale   SubCutaneous three times a day before meals  lidocaine 1% (Preservative-free) Injectable 1 milliLiter(s) Local Injection once  lisinopril 2.5 milliGRAM(s) Oral daily  meclizine 12.5 milliGRAM(s) Oral three times a day PRN  metoprolol succinate ER 25 milliGRAM(s) Oral daily  multivitamin 1 Tablet(s) Oral daily  mupirocin 2% Ointment 1 Application(s) Topical two times a day  oxybutynin 5 milliGRAM(s) Oral daily  pantoprazole    Tablet 40 milliGRAM(s) Oral before breakfast  potassium chloride    Tablet ER 40 milliEquivalent(s) Oral once      Objective:    Vital Signs:   T(C): 36.4 (12-30-20 @ 09:50), Max: 36.5 (12-29-20 @ 21:10)  HR: 96 (12-30-20 @ 09:50) (80 - 96)  BP: 93/61 (12-30-20 @ 09:50) (93/61 - 105/72)  RR: 18 (12-30-20 @ 09:50) (18 - 18)  SpO2: 100% (12-30-20 @ 09:50) (90% - 100%)  Wt(kg): --    Physical Exam:   General: obese woman, no acute distress  Neck: supple  CVS: JVP difficult to assess, RRR, s1, s2, systolic murmur at RUSB  Pulm: unlabored respirations, coarse breath sounds   Abd: obese  Ext: mild lower extremity edema b/l   Neuro A&O x3  Psych: Normal affect      Labs:   30 Dec 2020 08:04    135    |  90     |  28.0   ----------------------------<  214    3.4     |  31.0   |  0.57     Ca    9.2        30 Dec 2020 08:04  Phos  3.2       30 Dec 2020 08:04  Mg     1.6       30 Dec 2020 08:04    TPro  6.5    /  Alb  3.1    /  TBili  1.4    /  DBili  x      /  AST  14     /  ALT  6      /  AlkPhos  78     30 Dec 2020 08:04                          9.5    5.78  )-----------( 135      ( 30 Dec 2020 08:04 )             31.6     PT/INR - ( 30 Dec 2020 08:04 )   PT: 14.9 sec;   INR: 1.30 ratio         PTT - ( 30 Dec 2020 08:04 )  PTT:29.7 sec          ECG (12/22/2020): sinus rhythm, first degree AV block, LAD, old anteroseptal infarct, lateral ST/T wave abnormalities      TTE (12/23/2020):   1. Left ventricular ejection fraction, by visual estimation, is 35 to 40%.   2. Moderately decreased global left ventricular systolic function.   3. Entire septum is abnormal as described above.   4. Elevated left atrial and left ventricular end-diastolic pressures.   5. Severely increased LV wall thickness.   6. Spectral Doppler shows pseudonormal pattern of left ventricular myocardial filling (Grade II diastolic dysfunction).   7. Mild thickening and calcification of the anterior and posterior mitral valve leaflets.   8. Moderate mitral annular calcification.   9. A mobile echodensity on the mitral valve. Clinical correlations is recommended. Consider JOE if clinically indicated.  10. Moderate to severe mitral valve regurgitation.  11. Estimated pulmonary artery systolic pressure is 58.1 mmHg assuming a right atrial pressure of 8 mmHg, which is consistent with moderate pulmonary hypertension.  12. Mild aortic regurgitation.  13. Peak transaortic gradient equals 95.5 mmHg, mean transaortic gradient equals 65.0 mmHg, the calculated aortic valve area equals 0.35 cm² by the continuity equation consistent with severe aortic stenosis.  14. Dilatation of the ascending aorta.  15. Endocardial visualization was enhanced with intravenous echo contrast.  16. Case d/w Dr. Maddox at the time of the conclusion of the study.      JOE (12/28/2020):   1. Moderately decreased global left ventricular systolic function.   2. Left ventricular ejection fraction, by visual estimation, is 30%.   3. Normal left ventricular internal cavity size.   4. Left ventricle chordal calcification.   5. Moderately reduced RV systolic function.   6. Left atrial enlargement.   7. No left atrial appendage thrombus and normal left atrial appendage velocities.   8. Mildly enlarged right atrium.   9. Color flow doppler and intravenous injection of agitated saline demonstrates the presence of an intact intra atrial septum.  10. The mitral valve leaflets appear moderately thickened and calcified. 3-D evaluation of mitral valve leaflets was performed. No independent, mobile valvular vegetation visualized on mitral valve leaflets. Moderate mitral valve regurgitation.  11. The tricuspid valve leaflets appear mildly degenerative. Moderate tricuspid valve regurgitation. No independent, mobile valvular vegetation visualized on tricuspid valve leaflets.  12. 3-D evaluation of aortic valve was performed. The aortic valve leaflets are heavily calcified with reduced leaflet excursion and opening. Severe aortic valve stenosis (peak velocity 4.9 m/s, mean gradient 58 mmHg, calculated FLOYD by continuity equation 0.33 cm^2).  13. Mild aortic regurgitation.  14. Dilated proximal ascending aorta (3.6 cm; 2.1 cm/m^2). Mild intimal thickening visualized in the descending aorta.  15. Small pericardial effusion.  16. Recommend clinical correlation with the above findings.

## 2020-12-30 NOTE — PROGRESS NOTE ADULT - ASSESSMENT
81yo F with pmh of chf, DM2, HTN, HLD presented to ED c/o worsening SOB, b/l LE swellings and orthopnea and cough for the past several days. Pt reports to sob even at rest and the swellings in her legs has been causing her difficulty with walking. She saw her PMD who told her that she has fluid in her lung, and sent her to ED. Denies cp, fever, chills, n/v. In the ED found to have elevated BNP 19690, CT chest with large right pleural effusion.     Acute decompensated systolic HF with large rt pleural effusion    s/p chest tube placement and subsequent removal    c/w IV lasix    liver cirrhosis with ascites: likely 2/2 heart disease    see above    Severe AS With hypodensity MV   s/p JOE EF 30% mod MR, mod TR, severe AS but heavily calcified, dilated prox ascending aorta (3.6cm).   cath with nonobstructive cad   TAVR electively, undergoing pre op workup currently    Diabetes   -hold home po meds, start lantus  -ISS, hypoglycemic protocol, FSG  -A1c 6.7    HTN/HLD  -cont with Lisinopril and metoprolol with holding parameter   -cont with atorvastatin 20mg daily     dizziness: may related to severe AS    negative CT head, prn meclizine.    DVT ppx   -heparin    dispo: [facility]

## 2020-12-30 NOTE — CONSULT NOTE ADULT - SUBJECTIVE AND OBJECTIVE BOX
HISTORY OF PRESENT ILLNESS: This is a 80y old woman with a past medical history significant for   vertigo, HTN, DM, and chronic systolic and diastolic heart failure, who presented to to [facility] ED on  complaining of worsening shortness of breath, bilateral lower extremities swellings, orthopnea and cough for several days. Pt reported shortness of breath  even at rest and the swellings in her legs has been causing her difficulty with walking. In the ED found to have elevated BNP  and was started on Lasix Thoracic surgery was consulted after CT chest reveals large right pleural effusion.  A right pigtail was placed and then removed a few days later.  On TTE  EF 35-40%, JOE  showed EF 30%, Mod MR, Mod TR, Severe AS, FLOYD 0.33, mean gradient 58, Mild AI, and a small pericardial effusion. Today seeing by GI for evidence of hepatic cirrhosis with ascites on CT-scan. Patient denies ETOH, IV-drug used, family history of liver disease, but recalls an episode of jaundice when she was a teen. Denies nausea, vomiting, abdominal pain, chest pain, shortness of breath, hematemesis, hematochezia, melena.    ROS: A 14-point review of systems was completed and was otherwise negative save what was reported in the HPI.    PAST MEDICAL/SURGICAL HISTORY:  Vertigo    Hypertension    DM (diabetes mellitus)    Acute CHF (congestive heart failure)    S/P cholecystectomy    S/P left knee surgery      SOCIAL HISTORY:  - TOBACCO: Denies  - ALCOHOL: Denies  - ILLICIT DRUG USE: Denies    FAMILY HISTORY:  No known history of gastrointestinal or liver disease;  FH: hypertension        HOME MEDICATIONS:  glipiZIDE 10 mg oral tablet: 1 tab(s) orally once a day (22 Dec 2020 22:59)  Lasix 40 mg oral tablet: 1 tab(s) orally once a day (22 Dec 2020 22:59)  lisinopril 2.5 mg oral tablet: 1 tab(s) orally once a day (22 Dec 2020 22:59)  meclizine 12.5 mg oral tablet: 1 tab(s) orally 3 times a day (22 Dec 2020 22:59)  metFORMIN 500 mg oral tablet, extended release: 1 tab(s) orally once a day (22 Dec 2020 22:59)  metoprolol succinate 50 mg oral tablet, extended release: 1 tab(s) orally once a day (22 Dec 2020 22:59)  Multiple Vitamins oral tablet: 1 tab(s) orally once a day (22 Dec 2020 22:59)  omeprazole 20 mg oral delayed release capsule: 1 cap(s) orally once a day (22 Dec 2020 22:59)  oxybutynin 5 mg/24 hours oral tablet, extended release:  (22 Dec 2020 22:59)  potassium chloride 20 mEq oral granule, extended release: 1 tab(s) orally once a day (22 Dec 2020 22:59)  rosuvastatin 5 mg oral tablet: 1 tab(s) orally once a day (22 Dec 2020 22:59)  Vitamin D3 1000 intl units (25 mcg) oral capsule: 1 tab(s) orally once a day (22 Dec 2020 22:59)    INPATIENT MEDICATIONS:  MEDICATIONS  (STANDING):  aspirin enteric coated 81 milliGRAM(s) Oral daily  atorvastatin 20 milliGRAM(s) Oral at bedtime  cholecalciferol 1000 Unit(s) Oral daily  dextrose 40% Gel 15 Gram(s) Oral once  dextrose 5%. 1000 milliLiter(s) (50 mL/Hr) IV Continuous <Continuous>  dextrose 5%. 1000 milliLiter(s) (100 mL/Hr) IV Continuous <Continuous>  dextrose 50% Injectable 25 Gram(s) IV Push once  dextrose 50% Injectable 12.5 Gram(s) IV Push once  dextrose 50% Injectable 25 Gram(s) IV Push once  furosemide   Injectable 40 milliGRAM(s) IV Push two times a day  glucagon  Injectable 1 milliGRAM(s) IntraMuscular once  heparin   Injectable 5000 Unit(s) SubCutaneous every 12 hours  influenza   Vaccine 0.5 milliLiter(s) IntraMuscular once  insulin glargine Injectable (LANTUS) 15 Unit(s) SubCutaneous at bedtime  insulin lispro (ADMELOG) corrective regimen sliding scale   SubCutaneous three times a day before meals  lidocaine 1% (Preservative-free) Injectable 1 milliLiter(s) Local Injection once  lisinopril 2.5 milliGRAM(s) Oral daily  metoprolol succinate ER 25 milliGRAM(s) Oral daily  multivitamin 1 Tablet(s) Oral daily  mupirocin 2% Ointment 1 Application(s) Topical two times a day  oxybutynin 5 milliGRAM(s) Oral daily  pantoprazole    Tablet 40 milliGRAM(s) Oral before breakfast  potassium chloride    Tablet ER 40 milliEquivalent(s) Oral once    MEDICATIONS  (PRN):  acetaminophen   Tablet .. 650 milliGRAM(s) Oral every 6 hours PRN Mild Pain (1 - 3)  benzocaine 15 mG/menthol 3.6 mG (Sugar-Free) Lozenge 1 Lozenge Oral four times a day PRN Sore Throat  hemorrhoidal Ointment 1 Application(s) Rectal every 6 hours PRN rectal pain  hydrocortisone hemorrhoidal Suppository 1 Suppository(s) Rectal daily PRN hemorrhoidal pain  meclizine 12.5 milliGRAM(s) Oral three times a day PRN Dizziness    ALLERGIES:  No Known Allergies    T(C): 36.4 (20 @ 09:50), Max: 37.4 (20 @ 15:21)  HR: 96 (20 @ 09:50) (70 - 96)  BP: 93/61 (20 @ 09:50) (81/49 - 119/70)  RR: 18 (20 @ 09:50) (17 - 19)  SpO2: 100% (20 @ 09:50) (90% - 100%)    20 @ 07:01  -  20 @ 07:00  --------------------------------------------------------  IN: 0 mL / OUT: 350 mL / NET: -350 mL        PHYSICAL EXAM:  Constitutional: Elderly looking woman, well-nourished, in no apparent distress  Eyes: Sclerae anicteric, conjunctivae normal  ENMT: Mucus membranes moist, no oropharyngeal thrush noted  Respiratory: Breathing nonlabored; clear to auscultation  Cardiovascular: Regular rate and rhythm  Gastrointestinal: Soft, nontender, nondistended, normoactive bowel sounds.  Extremities: No clubbing, cyanosis, ++edema  Neurological: Alert and oriented to person, place and time; no asterixis  Skin: No jaundice  Musculoskeletal: No significant peripheral atrophy  Psychiatric: Affect and mood appropriate      LABS:             9.5    5.78  )-----------( 135      (  @ 08:04 )             31.6                9.7    5.83  )-----------( 131      (  @ 06:51 )             32.9                9.8    6.92  )-----------( 136      (  @ 07:58 )             33.4                10.7   6.89  )-----------( 116      ( 27 @ 11:04 )             35.9       PT/INR - ( 30 Dec 2020 08:04 )   PT: 14.9 sec;   INR: 1.30 ratio         PTT - ( 30 Dec 2020 08:04 )  PTT:29.7 sec      135  |  90<L>  |  28.0<H>  ----------------------------<  214<H>  3.4<L>   |  31.0<H>  |  0.57    Ca    9.2      30 Dec 2020 08:04  Phos  3.2     12  Mg     1.6         TPro  6.5<L>  /  Alb  3.1<L>  /  TBili  1.4  /  DBili  x   /  AST  14  /  ALT  6   /  AlkPhos  78  1230    LIVER FUNCTIONS - ( 30 Dec 2020 08:04 )  Alb: 3.1 g/dL / Pro: 6.5 g/dL / ALK PHOS: 78 U/L / ALT: 6 U/L / AST: 14 U/L / GGT: x           Urinalysis Basic - ( 29 Dec 2020 20:03 )    Color: Yellow / Appearance: Clear / S.015 / pH: x  Gluc: x / Ketone: Trace  / Bili: Negative / Urobili: 1   Blood: x / Protein: 30 mg/dL / Nitrite: Positive   Leuk Esterase: Moderate / RBC: 3-5 /HPF / WBC 11-25   Sq Epi: x / Non Sq Epi: Occasional / Bacteria: TNTC    IMAGING: I personally reviewed the Chest X-ray, and I agree with the radiologist's interpretation as described below:    Findings/  Impression:    The heart is enlarged. Hazy opacity at the right base likely represents layering effusion. Congestive changes are seen. Prominent right hilum. There is a left basilar infiltrate. Pigtail catheter sideholes appear to be beyond the right lateral rib cage.    Subsequent film from 11:35 AM shows a chest tube is been removed. No pneumothorax. Stable layering right effusion and left basilar infiltrate.   EXAM:  CT CHEST                          PROCEDURE DATE:  2020          INTERPRETATION:  CLINICAL INFORMATION: Pleural effusion    COMPARISON: 2005    PROCEDURE:  Multiple axial scans of the chest were obtained without IV contrast.    FINDINGS:    LUNGS AND AIRWAYS: Patent central airways.  Atelectasis or small infiltrates of the upper portion of the right lower lobe.  PLEURA: Large right pleural effusion. Very small left pleural effusion.  MEDIASTINUM AND NERY: No lymphadenopathy.  VESSELS: Within normal limits.  HEART: Heart is enlarged. No pericardial effusion.  CHEST WALL AND LOWER NECK: Skin thickening of the breasts  VISUALIZED UPPER ABDOMEN: Ascites with scalloped liver margin compatible with hepatic cirrhosis. Overall, the liver appears to be smaller than in the prior study. The patient is status post cholecystectomy.  BONES: Degenerative changes of the lower thoracic spine.    IMPRESSION:  Large right pleural effusion with breast skin thickening. Differential diagnosis would include mastitis versus passive congestion. Evidence of hepatic cirrhosis with ascites.   HISTORY OF PRESENT ILLNESS: This is a 80y old woman with a past medical history significant for vertigo, HTN, DM, and chronic systolic and diastolic heart failure, who presented to to [facility] ED on  complaining of worsening shortness of breath, bilateral lower extremities swellings, orthopnea and cough for several days. As per patient the swelling in her legs has been causing her difficulty with walking, and shortness of breath was even at rest. In the ED found to have elevated BNP  and was started on Lasix Thoracic surgery was consulted after CT chest reveals large right pleural effusion.  A right pigtail was placed and then removed a few days later.  On TTE  EF 35-40%, JOE  showed EF 30%, Mod MR, Mod TR, Severe AS, FLOYD 0.33, mean gradient 58, Mild AI, and a small pericardial effusion. Today seeing by GI for evidence of hepatic cirrhosis with ascites on CT-scan. Patient denies ETOH, IV-drug used, family history of liver disease, but recalls an episode of jaundice as a young female. Today patient also reporting less abdominal and leg swelling. Denies nausea, vomiting, abdominal pain, chest pain, shortness of breath, hematemesis, hematochezia, melena.    ROS: A 14-point review of systems was completed and was otherwise negative save what was reported in the HPI.    PAST MEDICAL/SURGICAL HISTORY:  Vertigo    Hypertension    DM (diabetes mellitus)    Acute CHF (congestive heart failure)    S/P cholecystectomy    S/P left knee surgery      SOCIAL HISTORY:  - TOBACCO: Denies  - ALCOHOL: Denies  - ILLICIT DRUG USE: Denies    FAMILY HISTORY:  No known history of gastrointestinal or liver disease;  FH: hypertension    HOME MEDICATIONS:  glipiZIDE 10 mg oral tablet: 1 tab(s) orally once a day (22 Dec 2020 22:59)  Lasix 40 mg oral tablet: 1 tab(s) orally once a day (22 Dec 2020 22:59)  lisinopril 2.5 mg oral tablet: 1 tab(s) orally once a day (22 Dec 2020 22:59)  meclizine 12.5 mg oral tablet: 1 tab(s) orally 3 times a day (22 Dec 2020 22:59)  metFORMIN 500 mg oral tablet, extended release: 1 tab(s) orally once a day (22 Dec 2020 22:59)  metoprolol succinate 50 mg oral tablet, extended release: 1 tab(s) orally once a day (22 Dec 2020 22:59)  Multiple Vitamins oral tablet: 1 tab(s) orally once a day (22 Dec 2020 22:59)  omeprazole 20 mg oral delayed release capsule: 1 cap(s) orally once a day (22 Dec 2020 22:59)  oxybutynin 5 mg/24 hours oral tablet, extended release:  (22 Dec 2020 22:59)  potassium chloride 20 mEq oral granule, extended release: 1 tab(s) orally once a day (22 Dec 2020 22:59)  rosuvastatin 5 mg oral tablet: 1 tab(s) orally once a day (22 Dec 2020 22:59)  Vitamin D3 1000 intl units (25 mcg) oral capsule: 1 tab(s) orally once a day (22 Dec 2020 22:59)    INPATIENT MEDICATIONS:  MEDICATIONS  (STANDING):  aspirin enteric coated 81 milliGRAM(s) Oral daily  atorvastatin 20 milliGRAM(s) Oral at bedtime  cholecalciferol 1000 Unit(s) Oral daily  dextrose 40% Gel 15 Gram(s) Oral once  dextrose 5%. 1000 milliLiter(s) (50 mL/Hr) IV Continuous <Continuous>  dextrose 5%. 1000 milliLiter(s) (100 mL/Hr) IV Continuous <Continuous>  dextrose 50% Injectable 25 Gram(s) IV Push once  dextrose 50% Injectable 12.5 Gram(s) IV Push once  dextrose 50% Injectable 25 Gram(s) IV Push once  furosemide   Injectable 40 milliGRAM(s) IV Push two times a day  glucagon  Injectable 1 milliGRAM(s) IntraMuscular once  heparin   Injectable 5000 Unit(s) SubCutaneous every 12 hours  influenza   Vaccine 0.5 milliLiter(s) IntraMuscular once  insulin glargine Injectable (LANTUS) 15 Unit(s) SubCutaneous at bedtime  insulin lispro (ADMELOG) corrective regimen sliding scale   SubCutaneous three times a day before meals  lidocaine 1% (Preservative-free) Injectable 1 milliLiter(s) Local Injection once  lisinopril 2.5 milliGRAM(s) Oral daily  metoprolol succinate ER 25 milliGRAM(s) Oral daily  multivitamin 1 Tablet(s) Oral daily  mupirocin 2% Ointment 1 Application(s) Topical two times a day  oxybutynin 5 milliGRAM(s) Oral daily  pantoprazole    Tablet 40 milliGRAM(s) Oral before breakfast  potassium chloride    Tablet ER 40 milliEquivalent(s) Oral once    MEDICATIONS  (PRN):  acetaminophen   Tablet .. 650 milliGRAM(s) Oral every 6 hours PRN Mild Pain (1 - 3)  benzocaine 15 mG/menthol 3.6 mG (Sugar-Free) Lozenge 1 Lozenge Oral four times a day PRN Sore Throat  hemorrhoidal Ointment 1 Application(s) Rectal every 6 hours PRN rectal pain  hydrocortisone hemorrhoidal Suppository 1 Suppository(s) Rectal daily PRN hemorrhoidal pain  meclizine 12.5 milliGRAM(s) Oral three times a day PRN Dizziness    ALLERGIES:  No Known Allergies    T(C): 36.4 (20 @ 09:50), Max: 37.4 (20 @ 15:21)  HR: 96 (20 @ 09:50) (70 - 96)  BP: 93/61 (20 @ 09:50) (81/49 - 119/70)  RR: 18 (20 @ 09:50) (17 - 19)  SpO2: 100% (20 @ 09:50) (90% - 100%)    20 @ 07:01  -  20 @ 07:00  --------------------------------------------------------  IN: 0 mL / OUT: 350 mL / NET: -350 mL        PHYSICAL EXAM:  Constitutional: Elderly looking woman, well-nourished, in no apparent distress  Eyes: Sclerae anicteric, conjunctivae normal  ENMT: Mucus membranes moist, no oropharyngeal thrush noted  Respiratory: Breathing nonlabored; clear to auscultation  Cardiovascular: Regular rate and rhythm  Gastrointestinal: Soft, nontender, nondistended, normoactive bowel sounds.  Extremities: No clubbing, cyanosis, ++edema  Neurological: Alert and oriented to person, place and time; no asterixis  Skin: No jaundice  Musculoskeletal: No significant peripheral atrophy  Psychiatric: Affect and mood appropriate      LABS:             9.5    5.78  )-----------( 135      (  @ 08:04 )             31.6                9.7    5.83  )-----------( 131      (  @ 06:51 )             32.9                9.8    6.92  )-----------( 136      (  @ 07:58 )             33.4                10.7   6.89  )-----------( 116      (  @ 11:04 )             35.9       PT/INR - ( 30 Dec 2020 08:04 )   PT: 14.9 sec;   INR: 1.30 ratio         PTT - ( 30 Dec 2020 08:04 )  PTT:29.7 sec      135  |  90<L>  |  28.0<H>  ----------------------------<  214<H>  3.4<L>   |  31.0<H>  |  0.57    Ca    9.2      30 Dec 2020 08:04  Phos  3.2       Mg     1.6         TPro  6.5<L>  /  Alb  3.1<L>  /  TBili  1.4  /  DBili  x   /  AST  14  /  ALT  6   /  AlkPhos  78  30    LIVER FUNCTIONS - ( 30 Dec 2020 08:04 )  Alb: 3.1 g/dL / Pro: 6.5 g/dL / ALK PHOS: 78 U/L / ALT: 6 U/L / AST: 14 U/L / GGT: x           Urinalysis Basic - ( 29 Dec 2020 20:03 )    Color: Yellow / Appearance: Clear / S.015 / pH: x  Gluc: x / Ketone: Trace  / Bili: Negative / Urobili: 1   Blood: x / Protein: 30 mg/dL / Nitrite: Positive   Leuk Esterase: Moderate / RBC: 3-5 /HPF / WBC 11-25   Sq Epi: x / Non Sq Epi: Occasional / Bacteria: TNTC    IMAGING: I personally reviewed the Chest X-ray, and I agree with the radiologist's interpretation as described below:    Findings/  Impression:    The heart is enlarged. Hazy opacity at the right base likely represents layering effusion. Congestive changes are seen. Prominent right hilum. There is a left basilar infiltrate. Pigtail catheter sideholes appear to be beyond the right lateral rib cage.    Subsequent film from 11:35 AM shows a chest tube is been removed. No pneumothorax. Stable layering right effusion and left basilar infiltrate.   EXAM:  CT CHEST                          PROCEDURE DATE:  2020          INTERPRETATION:  CLINICAL INFORMATION: Pleural effusion    COMPARISON: 2005    PROCEDURE:  Multiple axial scans of the chest were obtained without IV contrast.    FINDINGS:    LUNGS AND AIRWAYS: Patent central airways.  Atelectasis or small infiltrates of the upper portion of the right lower lobe.  PLEURA: Large right pleural effusion. Very small left pleural effusion.  MEDIASTINUM AND NERY: No lymphadenopathy.  VESSELS: Within normal limits.  HEART: Heart is enlarged. No pericardial effusion.  CHEST WALL AND LOWER NECK: Skin thickening of the breasts  VISUALIZED UPPER ABDOMEN: Ascites with scalloped liver margin compatible with hepatic cirrhosis. Overall, the liver appears to be smaller than in the prior study. The patient is status post cholecystectomy.  BONES: Degenerative changes of the lower thoracic spine.    IMPRESSION:  Large right pleural effusion with breast skin thickening. Differential diagnosis would include mastitis versus passive congestion. Evidence of hepatic cirrhosis with ascites.

## 2020-12-30 NOTE — PROGRESS NOTE ADULT - SUBJECTIVE AND OBJECTIVE BOX
s/p cath yesterday   Uneventful night   EKG pending  sinus at 93      Cardiac meds  acetaminophen   Tablet .. 650 milliGRAM(s) Oral every 6 hours PRN  aspirin enteric coated 81 milliGRAM(s) Oral daily  atorvastatin 20 milliGRAM(s) Oral at bedtime  benzocaine 15 mG/menthol 3.6 mG (Sugar-Free) Lozenge 1 Lozenge Oral four times a day PRN  cholecalciferol 1000 Unit(s) Oral daily  furosemide   Injectable 40 milliGRAM(s) IV Push two times a day  glucagon  Injectable 1 milliGRAM(s) IntraMuscular once  heparin   Injectable 5000 Unit(s) SubCutaneous every 12 hours  hydrocortisone hemorrhoidal Suppository 1 Suppository(s) Rectal daily PRN  influenza   Vaccine 0.5 milliLiter(s) IntraMuscular once  insulin glargine Injectable (LANTUS) 15 Unit(s) SubCutaneous at bedtime  insulin lispro (ADMELOG) corrective regimen sliding scale   SubCutaneous three times a day before meals  lidocaine 1% (Preservative-free) Injectable 1 milliLiter(s) Local Injection once  lisinopril 2.5 milliGRAM(s) Oral daily  meclizine 12.5 milliGRAM(s) Oral three times a day PRN  metoprolol succinate ER 25 milliGRAM(s) Oral daily  multivitamin 1 Tablet(s) Oral daily  mupirocin 2% Ointment 1 Application(s) Topical two times a day  oxybutynin 5 milliGRAM(s) Oral daily  pantoprazole    Tablet 40 milliGRAM(s) Oral before breakfast        12-30    135  |  90<L>  |  28.0<H>  ----------------------------<  214<H>  3.4<L>   |  31.0<H>  |  0.57    Ca    9.2      30 Dec 2020 08:04  Mg     1.8     12-29    TPro  6.5<L>  /  Alb  3.1<L>  /  TBili  1.4  /  DBili  x   /  AST  14  /  ALT  6   /  AlkPhos  78  12-30                        9.5    5.78  )-----------( 135      ( 30 Dec 2020 08:04 )             31.6     LIVER FUNCTIONS - ( 30 Dec 2020 08:04 )  Alb: 3.1 g/dL / Pro: 6.5 g/dL / ALK PHOS: 78 U/L / ALT: 6 U/L / AST: 14 U/L / GGT: x           T(C): 36.4 (12-30-20 @ 05:25), Max: 37.4 (12-29-20 @ 15:21)  HR: 96 (12-30-20 @ 05:25) (70 - 96)  BP: 105/72 (12-30-20 @ 05:25) (81/49 - 119/70)  RR: 18 (12-30-20 @ 05:25) (16 - 19)  SpO2: 98% (12-30-20 @ 05:25) (90% - 100%)      PE  Chest  Clear lung fields  Heart s1&s2 regular  3/6 late peaking murmur  Abd  soft active bowel sounds  EXT  No c/c/e  CATH SITE  right brachial and radial site no hematoma good pulse  Neuro  Alert oriented Non focal exam    A/P  ASHD s/p stent  Advised importance of taking antiplatelet agent on a regular basis  Low saturated fat diet discussed  wrist instruction given  currently undergoing a work up for Severe As       s/p cath yesterday   Uneventful night   EKG pending  sinus at 93      Cardiac meds  acetaminophen   Tablet .. 650 milliGRAM(s) Oral every 6 hours PRN  aspirin enteric coated 81 milliGRAM(s) Oral daily  atorvastatin 20 milliGRAM(s) Oral at bedtime  benzocaine 15 mG/menthol 3.6 mG (Sugar-Free) Lozenge 1 Lozenge Oral four times a day PRN  cholecalciferol 1000 Unit(s) Oral daily  furosemide   Injectable 40 milliGRAM(s) IV Push two times a day  glucagon  Injectable 1 milliGRAM(s) IntraMuscular once  heparin   Injectable 5000 Unit(s) SubCutaneous every 12 hours  hydrocortisone hemorrhoidal Suppository 1 Suppository(s) Rectal daily PRN  influenza   Vaccine 0.5 milliLiter(s) IntraMuscular once  insulin glargine Injectable (LANTUS) 15 Unit(s) SubCutaneous at bedtime  insulin lispro (ADMELOG) corrective regimen sliding scale   SubCutaneous three times a day before meals  lidocaine 1% (Preservative-free) Injectable 1 milliLiter(s) Local Injection once  lisinopril 2.5 milliGRAM(s) Oral daily  meclizine 12.5 milliGRAM(s) Oral three times a day PRN  metoprolol succinate ER 25 milliGRAM(s) Oral daily  multivitamin 1 Tablet(s) Oral daily  mupirocin 2% Ointment 1 Application(s) Topical two times a day  oxybutynin 5 milliGRAM(s) Oral daily  pantoprazole    Tablet 40 milliGRAM(s) Oral before breakfast        12-30    135  |  90<L>  |  28.0<H>  ----------------------------<  214<H>  3.4<L>   |  31.0<H>  |  0.57    Ca    9.2      30 Dec 2020 08:04  Mg     1.8     12-29    TPro  6.5<L>  /  Alb  3.1<L>  /  TBili  1.4  /  DBili  x   /  AST  14  /  ALT  6   /  AlkPhos  78  12-30                        9.5    5.78  )-----------( 135      ( 30 Dec 2020 08:04 )             31.6     LIVER FUNCTIONS - ( 30 Dec 2020 08:04 )  Alb: 3.1 g/dL / Pro: 6.5 g/dL / ALK PHOS: 78 U/L / ALT: 6 U/L / AST: 14 U/L / GGT: x           T(C): 36.4 (12-30-20 @ 05:25), Max: 37.4 (12-29-20 @ 15:21)  HR: 96 (12-30-20 @ 05:25) (70 - 96)  BP: 105/72 (12-30-20 @ 05:25) (81/49 - 119/70)  RR: 18 (12-30-20 @ 05:25) (16 - 19)  SpO2: 98% (12-30-20 @ 05:25) (90% - 100%)      PE  Chest  Clear lung fields  Heart s1&s2 regular  3/6 late peaking murmur  Abd  soft active bowel sounds  EXT  No c/c/e  CATH SITE  right brachial and radial site no hematoma good pulse  Neuro  Alert oriented Non focal exam    A/P  ASHD No intervention  Low saturated fat diet discussed  wrist instruction given  currently undergoing a work up for Severe As       s/p cath yesterday   Preliminary report  written report pending->  dLAD 75%  diagonal 75%  mLAD moderate disease  peak-peak gradient 125mm  FLOYD 0.34  PAWP 29    Uneventful night   EKG pending  sinus at 93      Cardiac meds  acetaminophen   Tablet .. 650 milliGRAM(s) Oral every 6 hours PRN  aspirin enteric coated 81 milliGRAM(s) Oral daily  atorvastatin 20 milliGRAM(s) Oral at bedtime  benzocaine 15 mG/menthol 3.6 mG (Sugar-Free) Lozenge 1 Lozenge Oral four times a day PRN  cholecalciferol 1000 Unit(s) Oral daily  furosemide   Injectable 40 milliGRAM(s) IV Push two times a day  glucagon  Injectable 1 milliGRAM(s) IntraMuscular once  heparin   Injectable 5000 Unit(s) SubCutaneous every 12 hours  hydrocortisone hemorrhoidal Suppository 1 Suppository(s) Rectal daily PRN  influenza   Vaccine 0.5 milliLiter(s) IntraMuscular once  insulin glargine Injectable (LANTUS) 15 Unit(s) SubCutaneous at bedtime  insulin lispro (ADMELOG) corrective regimen sliding scale   SubCutaneous three times a day before meals  lidocaine 1% (Preservative-free) Injectable 1 milliLiter(s) Local Injection once  lisinopril 2.5 milliGRAM(s) Oral daily  meclizine 12.5 milliGRAM(s) Oral three times a day PRN  metoprolol succinate ER 25 milliGRAM(s) Oral daily  multivitamin 1 Tablet(s) Oral daily  mupirocin 2% Ointment 1 Application(s) Topical two times a day  oxybutynin 5 milliGRAM(s) Oral daily  pantoprazole    Tablet 40 milliGRAM(s) Oral before breakfast        12-30    135  |  90<L>  |  28.0<H>  ----------------------------<  214<H>  3.4<L>   |  31.0<H>  |  0.57    Ca    9.2      30 Dec 2020 08:04  Mg     1.8     12-29    TPro  6.5<L>  /  Alb  3.1<L>  /  TBili  1.4  /  DBili  x   /  AST  14  /  ALT  6   /  AlkPhos  78  12-30                        9.5    5.78  )-----------( 135      ( 30 Dec 2020 08:04 )             31.6     LIVER FUNCTIONS - ( 30 Dec 2020 08:04 )  Alb: 3.1 g/dL / Pro: 6.5 g/dL / ALK PHOS: 78 U/L / ALT: 6 U/L / AST: 14 U/L / GGT: x           T(C): 36.4 (12-30-20 @ 05:25), Max: 37.4 (12-29-20 @ 15:21)  HR: 96 (12-30-20 @ 05:25) (70 - 96)  BP: 105/72 (12-30-20 @ 05:25) (81/49 - 119/70)  RR: 18 (12-30-20 @ 05:25) (16 - 19)  SpO2: 98% (12-30-20 @ 05:25) (90% - 100%)      PE  Chest  Clear lung fields  Heart s1&s2 regular  3/6 late peaking murmur  Abd  soft active bowel sounds  EXT  No c/c/e  CATH SITE  right brachial and radial site no hematoma good pulse  Neuro  Alert oriented Non focal exam    A/P  ASHD No intervention  Low saturated fat diet discussed  wrist instruction given  currently undergoing a work up for Severe As

## 2020-12-30 NOTE — CONSULT NOTE ADULT - PROBLEM SELECTOR PROBLEM 1
7/27/2017 2:27 PM 
 
Ms. Gilmer Reed [address] [id number] Dear Ms. Campbell, 
 
Surgery is scheduled as follows: 
 
Surgery date: 8-18-17 Location: [facility] 
 
Arrival time: 5:30am  Start time: 7:30am 
 
DO NOT EAT OR DRINK ANYTHING PAST MIDNIGHT THE NIGHT BEFORE SURGERY Pre-Registration: Date: 8-2-17  Time: 2:00pm Location: [facility] [address] [place] [address] ([facility]) [address]: [address] The nurses will review your medications with you at this time and also obtain the pre-testing that the doctor has ordered. Please allow approximately 1.5 hours for this appointment. 1st Post Operative appointment:  
 
Friday, September 01, 2017 09:20 AM with ALEX[facility] [address] Office Phone: [phone] For questions regarding this information please contact [place] at  [phone]. Sincerely, 
 
 
Maxine Mead Surgical Scheduler
Aortic valve stenosis, etiology of cardiac valve disease unspecified
Acute on chronic congestive heart failure, unspecified heart failure type
Cirrhosis of liver with ascites, unspecified hepatic cirrhosis type

## 2020-12-30 NOTE — PROGRESS NOTE ADULT - SUBJECTIVE AND OBJECTIVE BOX
Subjective:  Pt in bed NAD no issues overnight s/p CATH normal cors     VITAL SIGNS  T(C): 36.4 (20 @ 09:50), Max: 37.4 (20 @ 15:21)  HR: 96 (20 @ 09:50) (70 - 96)  BP: 93/61 (20 @ 09:50) (81/49 - 119/70)  RR: 18 (20 @ 09:50) (17 - 19)  SpO2: 100% (20 @ 09:50) (90% - 100%) RA            Daily     Daily Weight in k.7 (30 Dec 2020 06:06)  Admit Wt: Drug Dosing Weight  Height (cm): 160 (23 Dec 2020 19:09)  Weight (kg): 69.9 (23 Dec 2020 19:09)  BMI (kg/m2): 27.3 (23 Dec 2020 19:09)  BSA (m2): 1.73 (23 Dec 2020 19:09)  Telemetry:   SR  LVEF:  30%    MEDICATIONS  acetaminophen   Tablet .. 650 milliGRAM(s) Oral every 6 hours PRN  aspirin enteric coated 81 milliGRAM(s) Oral daily  atorvastatin 20 milliGRAM(s) Oral at bedtime  benzocaine 15 mG/menthol 3.6 mG (Sugar-Free) Lozenge 1 Lozenge Oral four times a day PRN  cholecalciferol 1000 Unit(s) Oral daily  dextrose 40% Gel 15 Gram(s) Oral once  dextrose 5%. 1000 milliLiter(s) IV Continuous <Continuous>  dextrose 5%. 1000 milliLiter(s) IV Continuous <Continuous>  dextrose 50% Injectable 25 Gram(s) IV Push once  dextrose 50% Injectable 12.5 Gram(s) IV Push once  dextrose 50% Injectable 25 Gram(s) IV Push once  furosemide   Injectable 40 milliGRAM(s) IV Push two times a day  glucagon  Injectable 1 milliGRAM(s) IntraMuscular once  hemorrhoidal Ointment 1 Application(s) Rectal every 6 hours PRN  heparin   Injectable 5000 Unit(s) SubCutaneous every 12 hours  hydrocortisone hemorrhoidal Suppository 1 Suppository(s) Rectal daily PRN  influenza   Vaccine 0.5 milliLiter(s) IntraMuscular once  insulin glargine Injectable (LANTUS) 15 Unit(s) SubCutaneous at bedtime  insulin lispro (ADMELOG) corrective regimen sliding scale   SubCutaneous three times a day before meals  lidocaine 1% (Preservative-free) Injectable 1 milliLiter(s) Local Injection once  lisinopril 2.5 milliGRAM(s) Oral daily  meclizine 12.5 milliGRAM(s) Oral three times a day PRN  metoprolol succinate ER 25 milliGRAM(s) Oral daily  multivitamin 1 Tablet(s) Oral daily  mupirocin 2% Ointment 1 Application(s) Topical two times a day  oxybutynin 5 milliGRAM(s) Oral daily  pantoprazole    Tablet 40 milliGRAM(s) Oral before breakfast  potassium chloride    Tablet ER 40 milliEquivalent(s) Oral once    MEDICATIONS  (PRN):  acetaminophen   Tablet .. 650 milliGRAM(s) Oral every 6 hours PRN Mild Pain (1 - 3)  benzocaine 15 mG/menthol 3.6 mG (Sugar-Free) Lozenge 1 Lozenge Oral four times a day PRN Sore Throat  hemorrhoidal Ointment 1 Application(s) Rectal every 6 hours PRN rectal pain  hydrocortisone hemorrhoidal Suppository 1 Suppository(s) Rectal daily PRN hemorrhoidal pain  meclizine 12.5 milliGRAM(s) Oral three times a day PRN Dizziness        PHYSICAL EXAM  General: Alert Awake NAD  Respiratory:  Decreased at bases b/l   CV: RRR S1 S2   Abdomen:  Soft NT ND + BS   Extremities:  Trace edema b/l LE               I&O's Detail    29 Dec 2020 07:01  -  30 Dec 2020 07:00  --------------------------------------------------------  IN:  Total IN: 0 mL    OUT:    Voided (mL): 350 mL  Total OUT: 350 mL    Total NET: -350 mL          LABS      135  |  90<L>  |  28.0<H>  ----------------------------<  214<H>  3.4<L>   |  31.0<H>  |  0.57    Ca    9.2      30 Dec 2020 08:04  Phos  3.2     12-30  Mg     1.6     12    TPro  6.5<L>  /  Alb  3.1<L>  /  TBili  1.4  /  DBili  x   /  AST  14  /  ALT  6   /  AlkPhos  78  12                                 9.5    5.78  )-----------( 135      ( 30 Dec 2020 08:04 )             31.6          PT/INR - ( 30 Dec 2020 08:04 )   PT: 14.9 sec;   INR: 1.30 ratio         PTT - ( 30 Dec 2020 08:04 )  PTT:29.7 sec      LIVER FUNCTIONS - ( 30 Dec 2020 08:04 )  Alb: 3.1 g/dL / Pro: 6.5 g/dL / ALK PHOS: 78 U/L / ALT: 6 U/L / AST: 14 U/L / GGT: x              CAPILLARY BLOOD GLUCOSE      POCT Blood Glucose.: 232 mg/dL (30 Dec 2020 08:00)  POCT Blood Glucose.: 282 mg/dL (29 Dec 2020 22:36)  POCT Blood Glucose.: 167 mg/dL (29 Dec 2020 17:15)               PAST MEDICAL & SURGICAL HISTORY:  Vertigo    Hypertension    DM (diabetes mellitus)    Acute CHF (congestive heart failure)    S/P cholecystectomy    S/P left knee surgery

## 2020-12-30 NOTE — PROGRESS NOTE ADULT - PROBLEM SELECTOR PLAN 1
pre op workup for TAVR in progress  CATH completed - Normal Cors  NPO p midnight for TAVR CTA   MSSA + , started bactroban  DW Dr sarabia in rounds   Needs PT  will most likely go to [facility] and then come back for TAVR

## 2020-12-30 NOTE — PHYSICAL THERAPY INITIAL EVALUATION ADULT - PERTINENT HX OF CURRENT PROBLEM, REHAB EVAL
79yo F with pmh of chf, DM2, HTN, HLD presented to ED c/o worsening SOB, b/l LE swellings and orthopnea and cough for the past several days CT chest with large right pleural effusion, pt is s/p placement and removal of Chest tube prior to PT evaluation. Pt admitted for Acute decompensated systolic HF, Frailty assessment
81yo F with pmh of chf, DM2, HTN, HLD presented to ED c/o worsening SOB, b/l LE swellings and orthopnea and cough for the past several days CT chest with large right pleural effusion, pt is s/p placement and removal of Chest tube prior to PT evaluation. Pt admitted for Acute decompensated systolic HF

## 2020-12-30 NOTE — PHYSICAL THERAPY INITIAL EVALUATION ADULT - GAIT PATTERN USED, PT EVAL
decreased gait velocity and activity tolerance, ambulation distance limited due to soiling herself, CNA aware
2-point gait

## 2020-12-30 NOTE — CONSULT NOTE ADULT - CONSULT REASON
Cirrhosis of liver with ascites.
CHF
Right pleural effusion
Aortic stenosis
Clear bilaterally, pupils equal, round and reactive to light.

## 2020-12-30 NOTE — PHYSICAL THERAPY INITIAL EVALUATION ADULT - PLANNED THERAPY INTERVENTIONS, PT EVAL
bed mobility training/gait training/strengthening/transfer training
bed mobility training/gait training/strengthening/transfer training

## 2020-12-30 NOTE — PROGRESS NOTE ADULT - SUBJECTIVE AND OBJECTIVE BOX
seen for severe AS, HF    no acute complaints  undergoing GI eval in am  ros negative      MEDICATIONS  (STANDING):  aspirin enteric coated 81 milliGRAM(s) Oral daily  atorvastatin 20 milliGRAM(s) Oral at bedtime  cholecalciferol 1000 Unit(s) Oral daily  dextrose 40% Gel 15 Gram(s) Oral once  dextrose 5%. 1000 milliLiter(s) (50 mL/Hr) IV Continuous <Continuous>  dextrose 5%. 1000 milliLiter(s) (100 mL/Hr) IV Continuous <Continuous>  dextrose 50% Injectable 25 Gram(s) IV Push once  dextrose 50% Injectable 12.5 Gram(s) IV Push once  dextrose 50% Injectable 25 Gram(s) IV Push once  furosemide   Injectable 40 milliGRAM(s) IV Push two times a day  glucagon  Injectable 1 milliGRAM(s) IntraMuscular once  heparin   Injectable 5000 Unit(s) SubCutaneous every 12 hours  influenza   Vaccine 0.5 milliLiter(s) IntraMuscular once  insulin glargine Injectable (LANTUS) 15 Unit(s) SubCutaneous at bedtime  insulin lispro (ADMELOG) corrective regimen sliding scale   SubCutaneous three times a day before meals  lidocaine 1% (Preservative-free) Injectable 1 milliLiter(s) Local Injection once  lisinopril 2.5 milliGRAM(s) Oral daily  metoprolol succinate ER 25 milliGRAM(s) Oral daily  multivitamin 1 Tablet(s) Oral daily  mupirocin 2% Ointment 1 Application(s) Topical two times a day  oxybutynin 5 milliGRAM(s) Oral daily  pantoprazole    Tablet 40 milliGRAM(s) Oral before breakfast  potassium chloride    Tablet ER 40 milliEquivalent(s) Oral once    MEDICATIONS  (PRN):  acetaminophen   Tablet .. 650 milliGRAM(s) Oral every 6 hours PRN Mild Pain (1 - 3)  benzocaine 15 mG/menthol 3.6 mG (Sugar-Free) Lozenge 1 Lozenge Oral four times a day PRN Sore Throat  hemorrhoidal Ointment 1 Application(s) Rectal every 6 hours PRN rectal pain  hydrocortisone hemorrhoidal Suppository 1 Suppository(s) Rectal daily PRN hemorrhoidal pain  meclizine 12.5 milliGRAM(s) Oral three times a day PRN Dizziness      Allergies    No Known Allergies      Vital Signs Last 24 Hrs  T(C): 36.4 (30 Dec 2020 09:50), Max: 37.4 (29 Dec 2020 15:21)  T(F): 97.5 (30 Dec 2020 09:50), Max: 99.3 (29 Dec 2020 15:21)  HR: 96 (30 Dec 2020 09:50) (70 - 96)  BP: 93/61 (30 Dec 2020 09:50) (93/61 - 119/70)  BP(mean): --  RR: 18 (30 Dec 2020 09:50) (18 - 19)  SpO2: 100% (30 Dec 2020 09:50) (90% - 100%)    PHYSICAL EXAM:    GENERAL: NAD  CHEST/LUNG: Clear to percussion bilaterally  HEART: Regular rate and rhythm; S1 S2; no murmurs noted  ABDOMEN: Soft, Nondistended; Bowel sounds present  EXTREMITIES: +1 to 2 edema lower legs   NERVOUS SYSTEM:  Alert & Oriented X3, nonfocal    LABS:                        9.5    5.78  )-----------( 135      ( 30 Dec 2020 08:04 )             31.6     12-30    135  |  90<L>  |  28.0<H>  ----------------------------<  214<H>  3.4<L>   |  31.0<H>  |  0.57    Ca    9.2      30 Dec 2020 08:04  Phos  3.2     12-30  Mg     1.6     12-30    TPro  6.5<L>  /  Alb  3.1<L>  /  TBili  1.4  /  DBili  x   /  AST  14  /  ALT  6   /  AlkPhos  78  12-30    PT/INR - ( 30 Dec 2020 08:04 )   PT: 14.9 sec;   INR: 1.30 ratio         PTT - ( 30 Dec 2020 08:04 )  PTT:29.7 sec  Urinalysis Basic - ( 29 Dec 2020 20:03 )    Color: Yellow / Appearance: Clear / S.015 / pH: x  Gluc: x / Ketone: Trace  / Bili: Negative / Urobili: 1   Blood: x / Protein: 30 mg/dL / Nitrite: Positive   Leuk Esterase: Moderate / RBC: 3-5 /HPF / WBC 11-25   Sq Epi: x / Non Sq Epi: Occasional / Bacteria: TNTC        CAPILLARY BLOOD GLUCOSE      POCT Blood Glucose.: 225 mg/dL (30 Dec 2020 12:17)  POCT Blood Glucose.: 232 mg/dL (30 Dec 2020 08:00)  POCT Blood Glucose.: 282 mg/dL (29 Dec 2020 22:36)  POCT Blood Glucose.: 167 mg/dL (29 Dec 2020 17:15)        RADIOLOGY & ADDITIONAL TESTS:

## 2020-12-30 NOTE — PHYSICAL THERAPY INITIAL EVALUATION ADULT - IMPAIRMENTS FOUND, PT EVAL
aerobic capacity/endurance/gait, locomotion, and balance/muscle strength/ventilation and respiration/gas exchange
aerobic capacity/endurance/gait, locomotion, and balance/muscle strength/ventilation and respiration/gas exchange

## 2020-12-30 NOTE — PHYSICAL THERAPY INITIAL EVALUATION ADULT - GENERAL OBSERVATIONS, REHAB EVAL
Pt received supine in bed + telemetry/ + O2nc, c/o 7/10 Pre/post PT pain "In my rectum and my throat" (RN aware of request for tylenol), pt agreeable to PT
Pt received supine in bed + telemetry/ + O2nc, c/o 7/10 Pre/post PT pain "In my rectum and my throat" (RN aware of request for tylenol), pt agreeable to PT

## 2020-12-30 NOTE — PHYSICAL THERAPY INITIAL EVALUATION ADULT - PRECAUTIONS/LIMITATIONS, REHAB EVAL
3L O2nc/fall precautions/oxygen therapy device and L/min
3L O2nc/fall precautions/oxygen therapy device and L/min

## 2020-12-30 NOTE — CONSULT NOTE ADULT - REASON FOR ADMISSION
Acute decompensated HF  right pleural effusion

## 2020-12-30 NOTE — PHYSICAL THERAPY INITIAL EVALUATION ADULT - ADDITIONAL COMMENTS
Pt lives alone in a private home. No steps to navigate. Pt was independent PTA with RW PTA. Pt owns RW, Commode, Shower chair and SAC.
Pt lives alone in a private home. No steps to navigate. Pt was independent PTA with RW PTA. Pt owns RW, Commode, Shower chair and SAC.

## 2020-12-31 DIAGNOSIS — I50.9 HEART FAILURE, UNSPECIFIED: ICD-10-CM

## 2020-12-31 DIAGNOSIS — K74.69 OTHER CIRRHOSIS OF LIVER: ICD-10-CM

## 2020-12-31 LAB
ANION GAP SERPL CALC-SCNC: 13 MMOL/L — SIGNIFICANT CHANGE UP (ref 5–17)
BUN SERPL-MCNC: 25 MG/DL — HIGH (ref 8–20)
CALCIUM SERPL-MCNC: 9.4 MG/DL — SIGNIFICANT CHANGE UP (ref 8.6–10.2)
CHLORIDE SERPL-SCNC: 88 MMOL/L — LOW (ref 98–107)
CO2 SERPL-SCNC: 34 MMOL/L — HIGH (ref 22–29)
CREAT SERPL-MCNC: 0.69 MG/DL — SIGNIFICANT CHANGE UP (ref 0.5–1.3)
FERRITIN SERPL-MCNC: 83 NG/ML — SIGNIFICANT CHANGE UP (ref 15–150)
GLUCOSE BLDC GLUCOMTR-MCNC: 129 MG/DL — HIGH (ref 70–99)
GLUCOSE BLDC GLUCOMTR-MCNC: 153 MG/DL — HIGH (ref 70–99)
GLUCOSE BLDC GLUCOMTR-MCNC: 187 MG/DL — HIGH (ref 70–99)
GLUCOSE BLDC GLUCOMTR-MCNC: 254 MG/DL — HIGH (ref 70–99)
GLUCOSE SERPL-MCNC: 144 MG/DL — HIGH (ref 70–99)
HCT VFR BLD CALC: 31.8 % — LOW (ref 34.5–45)
HGB BLD-MCNC: 9.5 G/DL — LOW (ref 11.5–15.5)
IRON SATN MFR SERPL: 36 UG/DL — LOW (ref 37–145)
IRON SATN MFR SERPL: 9 % — LOW (ref 14–50)
MAGNESIUM SERPL-MCNC: 1.6 MG/DL — LOW (ref 1.8–2.6)
MCHC RBC-ENTMCNC: 26.3 PG — LOW (ref 27–34)
MCHC RBC-ENTMCNC: 29.9 GM/DL — LOW (ref 32–36)
MCV RBC AUTO: 88.1 FL — SIGNIFICANT CHANGE UP (ref 80–100)
PLATELET # BLD AUTO: 166 K/UL — SIGNIFICANT CHANGE UP (ref 150–400)
POTASSIUM SERPL-MCNC: 3.6 MMOL/L — SIGNIFICANT CHANGE UP (ref 3.5–5.3)
POTASSIUM SERPL-SCNC: 3.6 MMOL/L — SIGNIFICANT CHANGE UP (ref 3.5–5.3)
RBC # BLD: 3.61 M/UL — LOW (ref 3.8–5.2)
RBC # FLD: 17.6 % — HIGH (ref 10.3–14.5)
SODIUM SERPL-SCNC: 135 MMOL/L — SIGNIFICANT CHANGE UP (ref 135–145)
TIBC SERPL-MCNC: 392 UG/DL — SIGNIFICANT CHANGE UP (ref 220–430)
TRANSFERRIN SERPL-MCNC: 274 MG/DL — SIGNIFICANT CHANGE UP (ref 192–382)
WBC # BLD: 5.77 K/UL — SIGNIFICANT CHANGE UP (ref 3.8–10.5)
WBC # FLD AUTO: 5.77 K/UL — SIGNIFICANT CHANGE UP (ref 3.8–10.5)

## 2020-12-31 PROCEDURE — 71275 CT ANGIOGRAPHY CHEST: CPT | Mod: 26

## 2020-12-31 PROCEDURE — 99232 SBSQ HOSP IP/OBS MODERATE 35: CPT

## 2020-12-31 PROCEDURE — 99231 SBSQ HOSP IP/OBS SF/LOW 25: CPT

## 2020-12-31 PROCEDURE — 74174 CTA ABD&PLVS W/CONTRAST: CPT | Mod: 26

## 2020-12-31 RX ORDER — MAGNESIUM SULFATE 500 MG/ML
2 VIAL (ML) INJECTION ONCE
Refills: 0 | Status: COMPLETED | OUTPATIENT
Start: 2020-12-31 | End: 2020-12-31

## 2020-12-31 RX ORDER — POTASSIUM CHLORIDE 20 MEQ
40 PACKET (EA) ORAL ONCE
Refills: 0 | Status: COMPLETED | OUTPATIENT
Start: 2020-12-31 | End: 2020-12-31

## 2020-12-31 RX ORDER — FUROSEMIDE 40 MG
40 TABLET ORAL ONCE
Refills: 0 | Status: COMPLETED | OUTPATIENT
Start: 2020-12-31 | End: 2020-12-31

## 2020-12-31 RX ORDER — FUROSEMIDE 40 MG
40 TABLET ORAL DAILY
Refills: 0 | Status: DISCONTINUED | OUTPATIENT
Start: 2021-01-01 | End: 2021-01-01

## 2020-12-31 RX ADMIN — Medication 650 MILLIGRAM(S): at 10:49

## 2020-12-31 RX ADMIN — Medication 1000 UNIT(S): at 09:10

## 2020-12-31 RX ADMIN — Medication 1 TABLET(S): at 09:10

## 2020-12-31 RX ADMIN — Medication 40 MILLIGRAM(S): at 06:10

## 2020-12-31 RX ADMIN — Medication 650 MILLIGRAM(S): at 11:26

## 2020-12-31 RX ADMIN — Medication 81 MILLIGRAM(S): at 09:10

## 2020-12-31 RX ADMIN — Medication 50 GRAM(S): at 17:38

## 2020-12-31 RX ADMIN — HEPARIN SODIUM 5000 UNIT(S): 5000 INJECTION INTRAVENOUS; SUBCUTANEOUS at 17:38

## 2020-12-31 RX ADMIN — INSULIN GLARGINE 15 UNIT(S): 100 INJECTION, SOLUTION SUBCUTANEOUS at 21:42

## 2020-12-31 RX ADMIN — ATORVASTATIN CALCIUM 20 MILLIGRAM(S): 80 TABLET, FILM COATED ORAL at 21:04

## 2020-12-31 RX ADMIN — Medication 40 MILLIEQUIVALENT(S): at 17:38

## 2020-12-31 RX ADMIN — LISINOPRIL 2.5 MILLIGRAM(S): 2.5 TABLET ORAL at 06:10

## 2020-12-31 RX ADMIN — Medication 25 MILLIGRAM(S): at 09:09

## 2020-12-31 RX ADMIN — MUPIROCIN 1 APPLICATION(S): 20 OINTMENT TOPICAL at 06:15

## 2020-12-31 RX ADMIN — MUPIROCIN 1 APPLICATION(S): 20 OINTMENT TOPICAL at 17:38

## 2020-12-31 RX ADMIN — PANTOPRAZOLE SODIUM 40 MILLIGRAM(S): 20 TABLET, DELAYED RELEASE ORAL at 06:10

## 2020-12-31 RX ADMIN — Medication 5 MILLIGRAM(S): at 09:09

## 2020-12-31 RX ADMIN — HEPARIN SODIUM 5000 UNIT(S): 5000 INJECTION INTRAVENOUS; SUBCUTANEOUS at 06:10

## 2020-12-31 NOTE — PROGRESS NOTE ADULT - SUBJECTIVE AND OBJECTIVE BOX
Chief Complaint: This is a 80y old woman patient being seen in follow-up consultation for cirrhosis of liver with ascites.    HPI / 24H events: Patient seen and evaluated at bed side reporting no complains, tolerating oral intake, Denies nausea, vomiting, abdominal pain, chest pain, shortness of breath, hematemesis, hematochezia, melena.  ROS: A 14-point review of systems was reviewed and was otherwise negative save what was reported in the HPI.    PAST MEDICAL/SURGICAL HISTORY:  Vertigo    Hypertension    DM (diabetes mellitus)    Acute CHF (congestive heart failure)    S/P cholecystectomy    S/P left knee surgery      MEDICATIONS  (STANDING):  aspirin enteric coated 81 milliGRAM(s) Oral daily  atorvastatin 20 milliGRAM(s) Oral at bedtime  cholecalciferol 1000 Unit(s) Oral daily  dextrose 40% Gel 15 Gram(s) Oral once  dextrose 5%. 1000 milliLiter(s) (50 mL/Hr) IV Continuous <Continuous>  dextrose 5%. 1000 milliLiter(s) (100 mL/Hr) IV Continuous <Continuous>  dextrose 50% Injectable 25 Gram(s) IV Push once  dextrose 50% Injectable 12.5 Gram(s) IV Push once  dextrose 50% Injectable 25 Gram(s) IV Push once  furosemide   Injectable 40 milliGRAM(s) IV Push two times a day  glucagon  Injectable 1 milliGRAM(s) IntraMuscular once  heparin   Injectable 5000 Unit(s) SubCutaneous every 12 hours  influenza   Vaccine 0.5 milliLiter(s) IntraMuscular once  insulin glargine Injectable (LANTUS) 15 Unit(s) SubCutaneous at bedtime  insulin lispro (ADMELOG) corrective regimen sliding scale   SubCutaneous three times a day before meals  lidocaine 1% (Preservative-free) Injectable 1 milliLiter(s) Local Injection once  lisinopril 2.5 milliGRAM(s) Oral daily  metoprolol succinate ER 25 milliGRAM(s) Oral daily  multivitamin 1 Tablet(s) Oral daily  mupirocin 2% Ointment 1 Application(s) Topical two times a day  oxybutynin 5 milliGRAM(s) Oral daily  pantoprazole    Tablet 40 milliGRAM(s) Oral before breakfast  potassium chloride    Tablet ER 40 milliEquivalent(s) Oral once    MEDICATIONS  (PRN):  acetaminophen   Tablet .. 650 milliGRAM(s) Oral every 6 hours PRN Mild Pain (1 - 3)  benzocaine 15 mG/menthol 3.6 mG (Sugar-Free) Lozenge 1 Lozenge Oral four times a day PRN Sore Throat  hemorrhoidal Ointment 1 Application(s) Rectal every 6 hours PRN rectal pain  hydrocortisone hemorrhoidal Suppository 1 Suppository(s) Rectal daily PRN hemorrhoidal pain  meclizine 12.5 milliGRAM(s) Oral three times a day PRN Dizziness    No Known Allergies    T(C): 36.6 (12-31-20 @ 09:11), Max: 36.8 (12-30-20 @ 15:33)  HR: 78 (12-31-20 @ 09:11) (78 - 80)  BP: 100/65 (12-31-20 @ 09:11) (92/62 - 103/68)  RR: 18 (12-31-20 @ 09:11) (16 - 18)  SpO2: 100% (12-31-20 @ 09:11) (94% - 100%)    I&O's Summary    30 Dec 2020 07:01  -  31 Dec 2020 07:00  --------------------------------------------------------  IN: 840 mL / OUT: 1700 mL / NET: -860 mL      PHYSICAL EXAM:  Constitutional: Elderly looking woman, in no apparent distress  Eyes: Sclerae anicteric, conjunctivae normal  ENMT: Mucus membranes moist, no oropharyngeal thrush noted  Respiratory: Breathing nonlabored; clear to auscultation  Cardiovascular: Regular rate and rhythm  Gastrointestinal: Soft, nontender, nondistended, normoactive bowel sounds.  Extremities: No clubbing, cyanosis or edema  Neurological: Alert and oriented to person, place and time; no asterixis  Skin: No jaundice  Musculoskeletal: No significant peripheral atrophy  Psychiatric: Affect and mood appropriate                   9.5    5.78  )-----------( 135      ( 12-30 @ 08:04 )             31.6                9.7    5.83  )-----------( 131      ( 12-29 @ 06:51 )             32.9       12-30    135  |  90<L>  |  28.0<H>  ----------------------------<  214<H>  3.4<L>   |  31.0<H>  |  0.57    Ca    9.2      30 Dec 2020 08:04  Phos  3.2     12-30  Mg     1.6     12-30    TPro  6.5<L>  /  Alb  3.1<L>  /  TBili  1.4  /  DBili  x   /  AST  14  /  ALT  6   /  AlkPhos  78  12-30    LIVER FUNCTIONS - ( 30 Dec 2020 08:04 )  Alb: 3.1 g/dL / Pro: 6.5 g/dL / ALK PHOS: 78 U/L / ALT: 6 U/L / AST: 14 U/L / GGT: x               PT/INR - ( 30 Dec 2020 08:04 )   PT: 14.9 sec;   INR: 1.30 ratio         PTT - ( 30 Dec 2020 08:04 )  PTT:29.7 sec    IMAGING: I personally reviewed the US of abdomen, and I agree with the radiologist's interpretation as described below:  FINDINGS:    Liver: Nodular surface contour consistent with cirrhosis. Coarsened echotexture. Hyperechoic lesion measuring 3 mm in the left hepatic lobe.  Bile ducts: Normal caliber. Common bile duct measures 7 mm.  Gallbladder: Cholecystectomy.  Pancreas: Visualized portions are within normal limits.  Spleen: 12.3 cm. Within normal limits.  Right kidney: 9.3 cm. No hydronephrosis.  Left kidney: 9.4 cm.  No hydronephrosis.  Ascites: Small amount of ascites in the right and left upper and lower quadrants. Bilateral pleural effusions.  Aorta and IVC: Visualized portions normal in caliber.    IMPRESSION:  Cirrhotic liver.    3 mm hyperechoic lesion in the left hepatic lobe, incompletely characterized on this study; appearance is typical of a hemangioma, however is indeterminate in the setting of cirrhosis.   Chief Complaint: This is a 80y old woman patient being seen in follow-up consultation for cirrhosis of liver with ascites.    HPI / 24H events: Patient seen and evaluated at bed side reporting no complains, tolerating oral intake, Denies nausea, vomiting, abdominal pain, chest pain, shortness of breath, hematemesis, hematochezia, melena.    ROS: A 14-point review of systems was reviewed and was otherwise negative save what was reported in the HPI.    PAST MEDICAL/SURGICAL HISTORY:  Vertigo    Hypertension    DM (diabetes mellitus)    Acute CHF (congestive heart failure)    S/P cholecystectomy    S/P left knee surgery      MEDICATIONS  (STANDING):  aspirin enteric coated 81 milliGRAM(s) Oral daily  atorvastatin 20 milliGRAM(s) Oral at bedtime  cholecalciferol 1000 Unit(s) Oral daily  dextrose 40% Gel 15 Gram(s) Oral once  dextrose 5%. 1000 milliLiter(s) (50 mL/Hr) IV Continuous <Continuous>  dextrose 5%. 1000 milliLiter(s) (100 mL/Hr) IV Continuous <Continuous>  dextrose 50% Injectable 25 Gram(s) IV Push once  dextrose 50% Injectable 12.5 Gram(s) IV Push once  dextrose 50% Injectable 25 Gram(s) IV Push once  furosemide   Injectable 40 milliGRAM(s) IV Push two times a day  glucagon  Injectable 1 milliGRAM(s) IntraMuscular once  heparin   Injectable 5000 Unit(s) SubCutaneous every 12 hours  influenza   Vaccine 0.5 milliLiter(s) IntraMuscular once  insulin glargine Injectable (LANTUS) 15 Unit(s) SubCutaneous at bedtime  insulin lispro (ADMELOG) corrective regimen sliding scale   SubCutaneous three times a day before meals  lidocaine 1% (Preservative-free) Injectable 1 milliLiter(s) Local Injection once  lisinopril 2.5 milliGRAM(s) Oral daily  metoprolol succinate ER 25 milliGRAM(s) Oral daily  multivitamin 1 Tablet(s) Oral daily  mupirocin 2% Ointment 1 Application(s) Topical two times a day  oxybutynin 5 milliGRAM(s) Oral daily  pantoprazole    Tablet 40 milliGRAM(s) Oral before breakfast  potassium chloride    Tablet ER 40 milliEquivalent(s) Oral once    MEDICATIONS  (PRN):  acetaminophen   Tablet .. 650 milliGRAM(s) Oral every 6 hours PRN Mild Pain (1 - 3)  benzocaine 15 mG/menthol 3.6 mG (Sugar-Free) Lozenge 1 Lozenge Oral four times a day PRN Sore Throat  hemorrhoidal Ointment 1 Application(s) Rectal every 6 hours PRN rectal pain  hydrocortisone hemorrhoidal Suppository 1 Suppository(s) Rectal daily PRN hemorrhoidal pain  meclizine 12.5 milliGRAM(s) Oral three times a day PRN Dizziness    No Known Allergies    T(C): 36.6 (12-31-20 @ 09:11), Max: 36.8 (12-30-20 @ 15:33)  HR: 78 (12-31-20 @ 09:11) (78 - 80)  BP: 100/65 (12-31-20 @ 09:11) (92/62 - 103/68)  RR: 18 (12-31-20 @ 09:11) (16 - 18)  SpO2: 100% (12-31-20 @ 09:11) (94% - 100%)    I&O's Summary    30 Dec 2020 07:01  -  31 Dec 2020 07:00  --------------------------------------------------------  IN: 840 mL / OUT: 1700 mL / NET: -860 mL      PHYSICAL EXAM:  Constitutional: Elderly woman in no apparent distress  Eyes: Sclerae anicteric, conjunctivae normal  ENMT: Mucus membranes moist, no oropharyngeal thrush noted  Respiratory: Breathing nonlabored; clear to auscultation  Cardiovascular: Regular rate and rhythm  Gastrointestinal: Soft, nontender, nondistended, normoactive bowel sounds.  Extremities: No clubbing or cyanosis; ++edema  Neurological: Alert and oriented to person, place and time; no asterixis  Skin: No jaundice  Musculoskeletal: No significant peripheral atrophy  Psychiatric: Affect and mood appropriate      LABS:             9.5    5.78  )-----------( 135      ( 12-30 @ 08:04 )             31.6                9.7    5.83  )-----------( 131      ( 12-29 @ 06:51 )             32.9       12-30    135  |  90<L>  |  28.0<H>  ----------------------------<  214<H>  3.4<L>   |  31.0<H>  |  0.57    Ca    9.2      30 Dec 2020 08:04  Phos  3.2     12-30  Mg     1.6     12-30    TPro  6.5<L>  /  Alb  3.1<L>  /  TBili  1.4  /  DBili  x   /  AST  14  /  ALT  6   /  AlkPhos  78  12-30    LIVER FUNCTIONS - ( 30 Dec 2020 08:04 )  Alb: 3.1 g/dL / Pro: 6.5 g/dL / ALK PHOS: 78 U/L / ALT: 6 U/L / AST: 14 U/L / GGT: x           PT/INR - ( 30 Dec 2020 08:04 )   PT: 14.9 sec;   INR: 1.30 ratio         PTT - ( 30 Dec 2020 08:04 )  PTT:29.7 sec    IMAGING: I personally reviewed the US of abdomen, and I agree with the radiologist's interpretation as described below:    FINDINGS:  Liver: Nodular surface contour consistent with cirrhosis. Coarsened echotexture. Hyperechoic lesion measuring 3 mm in the left hepatic lobe.  Bile ducts: Normal caliber. Common bile duct measures 7 mm.  Gallbladder: Cholecystectomy.  Pancreas: Visualized portions are within normal limits.  Spleen: 12.3 cm. Within normal limits.  Right kidney: 9.3 cm. No hydronephrosis.  Left kidney: 9.4 cm.  No hydronephrosis.  Ascites: Small amount of ascites in the right and left upper and lower quadrants. Bilateral pleural effusions.  Aorta and IVC: Visualized portions normal in caliber.    IMPRESSION:  Cirrhotic liver.    3 mm hyperechoic lesion in the left hepatic lobe, incompletely characterized on this study; appearance is typical of a hemangioma, however is indeterminate in the setting of cirrhosis.

## 2020-12-31 NOTE — PROGRESS NOTE ADULT - PROBLEM SELECTOR PLAN 2
GI consult P
etio cardiac vs ROTH per GI  diuresis, no further treatment indicated
As above.
Diuresis per primary team.
Maintain CT to WS,     Monitor daily outputs.    Daily CXR while chest tube in place.    CTS to follow.

## 2020-12-31 NOTE — PROGRESS NOTE ADULT - PROBLEM SELECTOR PLAN 1
pre op workup for TAVR in progress  CATH completed - Normal Cors  s/p TAVR CTA today, awaiting results  MSSA + , started bactroban  DW Dr Hercules in AM   Needs PT  will most likely go to [facility] and then come back for TAVR

## 2020-12-31 NOTE — PROGRESS NOTE ADULT - PROBLEM SELECTOR PLAN 1
Most likely cardiac in nature.  Follow up cardiology recommendations.  No other extensive evaluation will be recommended. Etiology possibly cardiac, possibly ROTH.  Not a candidate for OLT due to age and underlying comorbidities.  No further evaluation or workup indicated.  Ascites and edema will be optimized via decompensated heart failure management.    GI will sign off.  Please reconsult as needed and call with any questions or concerns.

## 2020-12-31 NOTE — PROGRESS NOTE ADULT - SUBJECTIVE AND OBJECTIVE BOX
ERIKA DIANA  [id number]        Chief Complaint: follow up new HFrEF/AS      Subjective: feels better, less SOB but tired with minimal ambulation      24 hour Tele: SR, no events        acetaminophen   Tablet .. 650 milliGRAM(s) Oral every 6 hours PRN  aspirin enteric coated 81 milliGRAM(s) Oral daily  atorvastatin 20 milliGRAM(s) Oral at bedtime  benzocaine 15 mG/menthol 3.6 mG (Sugar-Free) Lozenge 1 Lozenge Oral four times a day PRN  cholecalciferol 1000 Unit(s) Oral daily  dextrose 40% Gel 15 Gram(s) Oral once  dextrose 5%. 1000 milliLiter(s) IV Continuous <Continuous>  dextrose 5%. 1000 milliLiter(s) IV Continuous <Continuous>  dextrose 50% Injectable 25 Gram(s) IV Push once  dextrose 50% Injectable 12.5 Gram(s) IV Push once  dextrose 50% Injectable 25 Gram(s) IV Push once  furosemide   Injectable 40 milliGRAM(s) IV Push two times a day  glucagon  Injectable 1 milliGRAM(s) IntraMuscular once  hemorrhoidal Ointment 1 Application(s) Rectal every 6 hours PRN  heparin   Injectable 5000 Unit(s) SubCutaneous every 12 hours  hydrocortisone hemorrhoidal Suppository 1 Suppository(s) Rectal daily PRN  influenza   Vaccine 0.5 milliLiter(s) IntraMuscular once  insulin glargine Injectable (LANTUS) 15 Unit(s) SubCutaneous at bedtime  insulin lispro (ADMELOG) corrective regimen sliding scale   SubCutaneous three times a day before meals  lidocaine 1% (Preservative-free) Injectable 1 milliLiter(s) Local Injection once  lisinopril 2.5 milliGRAM(s) Oral daily  meclizine 12.5 milliGRAM(s) Oral three times a day PRN  metoprolol succinate ER 25 milliGRAM(s) Oral daily  multivitamin 1 Tablet(s) Oral daily  mupirocin 2% Ointment 1 Application(s) Topical two times a day  oxybutynin 5 milliGRAM(s) Oral daily  pantoprazole    Tablet 40 milliGRAM(s) Oral before breakfast  potassium chloride    Tablet ER 40 milliEquivalent(s) Oral once          Physical Exam:  T(C): 36.6 (12-31-20 @ 09:11), Max: 36.8 (12-30-20 @ 15:33)  HR: 78 (12-31-20 @ 09:11) (78 - 80)  BP: 100/65 (12-31-20 @ 09:11) (92/62 - 103/68)  RR: 18 (12-31-20 @ 09:11) (16 - 18)  SpO2: 100% (12-31-20 @ 09:11) (94% - 100%)  Wt(kg): --  General: Comfortable in NAD  HEENT: dry MM, sclera anicteric  Resp: CTA bilaterally  CVS: nl s1 absent s2, rrr, III/VI systolic murmur  Abd: soft, NT, ND, BS+  Ext: 1-2+ pitting edema bilaterally   Neuro A&O x3  Psych: Normal affect    I&O's Summary    30 Dec 2020 07:01  -  31 Dec 2020 07:00  --------------------------------------------------------  IN: 840 mL / OUT: 1700 mL / NET: -860 mL          Labs:   30 Dec 2020 08:04    135    |  90     |  28.0   ----------------------------<  214    3.4     |  31.0   |  0.57     Ca    9.2        30 Dec 2020 08:04  Phos  3.2       30 Dec 2020 08:04  Mg     1.6       30 Dec 2020 08:04    TPro  6.5    /  Alb  3.1    /  TBili  1.4    /  DBili  x      /  AST  14     /  ALT  6      /  AlkPhos  78     30 Dec 2020 08:04                          9.5    5.78  )-----------( 135      ( 30 Dec 2020 08:04 )             31.6     PT/INR - ( 30 Dec 2020 08:04 )   PT: 14.9 sec;   INR: 1.30 ratio         PTT - ( 30 Dec 2020 08:04 )  PTT:29.7 sec        TTE (12/23/2020):   1. Left ventricular ejection fraction, by visual estimation, is 35 to 40%.   2. Moderately decreased global left ventricular systolic function.   3. Entire septum is abnormal as described above.   4. Elevated left atrial and left ventricular end-diastolic pressures.   5. Severely increased LV wall thickness.   6. Spectral Doppler shows pseudonormal pattern of left ventricular myocardial filling (Grade II diastolic dysfunction).   7. Mild thickening and calcification of the anterior and posterior mitral valve leaflets.   8. Moderate mitral annular calcification.   9. A mobile echodensity on the mitral valve. Clinical correlations is recommended. Consider JOE if clinically indicated.  10. Moderate to severe mitral valve regurgitation.  11. Estimated pulmonary artery systolic pressure is 58.1 mmHg assuming a right atrial pressure of 8 mmHg, which is consistent with moderate pulmonary hypertension.  12. Mild aortic regurgitation.  13. Peak transaortic gradient equals 95.5 mmHg, mean transaortic gradient equals 65.0 mmHg, the calculated aortic valve area equals 0.35 cm² by the continuity equation consistent with severe aortic stenosis.  14. Dilatation of the ascending aorta.  15. Endocardial visualization was enhanced with intravenous echo contrast.  16. Case d/w Dr. Maddox at the time of the conclusion of the study.      JOE (12/28/2020):   1. Moderately decreased global left ventricular systolic function.   2. Left ventricular ejection fraction, by visual estimation, is 30%.   3. Normal left ventricular internal cavity size.   4. Left ventricle chordal calcification.   5. Moderately reduced RV systolic function.   6. Left atrial enlargement.   7. No left atrial appendage thrombus and normal left atrial appendage velocities.   8. Mildly enlarged right atrium.   9. Color flow doppler and intravenous injection of agitated saline demonstrates the presence of an intact intra atrial septum.  10. The mitral valve leaflets appear moderately thickened and calcified. 3-D evaluation of mitral valve leaflets was performed. No independent, mobile valvular vegetation visualized on mitral valve leaflets. Moderate mitral valve regurgitation.  11. The tricuspid valve leaflets appear mildly degenerative. Moderate tricuspid valve regurgitation. No independent, mobile valvular vegetation visualized on tricuspid valve leaflets.  12. 3-D evaluation of aortic valve was performed. The aortic valve leaflets are heavily calcified with reduced leaflet excursion and opening. Severe aortic valve stenosis (peak velocity 4.9 m/s, mean gradient 58 mmHg, calculated FLOYD by continuity equation 0.33 cm^2).  13. Mild aortic regurgitation.  14. Dilated proximal ascending aorta (3.6 cm; 2.1 cm/m^2). Mild intimal thickening visualized in the descending aorta.  15. Small pericardial effusion.  16. Recommend clinical correlation with the above findings.    CORONARY VESSELS: The coronary circulation is right dominant.  LM:   --  LM: There was a 30 % stenosis in the distal third of the vessel  segment.  LAD:   --  Ostial LAD: There was a 30 % stenosis.  --  Distal LAD: There was a 80 % stenosis.  --  D1: There was a 80 % stenosis at the ostium of the vessel segment.  CX:   --  Circumflex: The vessel was medium sized. Angiography showed mild  atherosclerosis with no flow limiting lesions.  RI:   --  Ramus intermedius: The vessel was large sized. Angiography showed  mild atherosclerosis with no flow limiting lesions.  RCA:   --  RCA: The vessel was large sized. Angiography showed mild  atherosclerosis with no flow limiting lesions.      Assessment and Plan:   · Assessment	  Assessment:  Erika Zhang is an 80 year old woman with past medical history of Hypertension, Diabetes mellitus, HFrEF (LVEF 35-40% in 12/2020), Severe aortic stenosis and moderate-to-severe pulmonary hypertension who presents with increasing dyspnea and leg swelling found to have acute on chronic heart failure exacerbation, also with large right pleural effusion now s/p right chest tube.  -TTE with moderate LV dysfunction and wall motion abnormalities which may suggest multi-vessel CAD, cath with significant disease in distal LAD and diagonal, med management  -Also with severe aortic stenosis and moderate MR, not severe on JOE. No vegetation either  -Has moderate RV dysfunction  -Liver cirrhosis as well, possilbe chronic right heart failure vs ROTH. Seen by GI, no intervention  -Still edematous but CHF better compensated      Plan:  1. Continue IV diuresis at least another 24 hours, transition to lasix 40mg po daily tomorrow  2. DC planning to [facility], return for TAVR evaluation post discharge  3. Med management of CAD, ASA/statin/beta blocker. Can consider stress testing as outpatient to determine need for revascularization        Cheng Manley MD

## 2020-12-31 NOTE — PROGRESS NOTE ADULT - PROVIDER SPECIALTY LIST ADULT
Cardiology
Hospitalist
Thoracic Surgery
CT Surgery
Cardiology
Hospitalist
Thoracic Surgery
CT Surgery
Gastroenterology
CT Surgery
Thoracic Surgery

## 2020-12-31 NOTE — PROGRESS NOTE ADULT - PROBLEM SELECTOR PROBLEM 2
Cirrhosis of liver with ascites, unspecified hepatic cirrhosis type
Cirrhosis of liver with ascites, unspecified hepatic cirrhosis type
Pleural effusion
Acute on chronic combined systolic (congestive) and diastolic (congestive) heart failure
Acute congestive heart failure, unspecified heart failure type

## 2020-12-31 NOTE — PROGRESS NOTE ADULT - REASON FOR ADMISSION
Acute decompensated HF  right pleural effusion

## 2020-12-31 NOTE — PROGRESS NOTE ADULT - SUBJECTIVE AND OBJECTIVE BOX
Subjective: "I feel ok" Reports no current SOB, CP. S/P TAVR CTA today.    T(C): 36.8 (12-31-20 @ 20:58), Max: 36.8 (12-31-20 @ 20:58)  HR: 73 (12-31-20 @ 20:58) (73 - 80)  BP: 91/59 (12-31-20 @ 20:58) (91/59 - 103/68)  RR: 18 (12-31-20 @ 20:58) (16 - 18)  SpO2: 100% (12-31-20 @ 20:58) (94% - 100%)    General: WN/WD NAD  Neurology: A&Ox3, nonfocal, MASTERS x 4  Respiratory: CTA B/L  CV: RRR, S1S2, + systolic harsh murmur  Abdominal: Soft, NT, ND +BS,   Extremities: 2+ edema, + peripheral pulses      Radiology, Labs and Medications Reviewed

## 2020-12-31 NOTE — PROGRESS NOTE ADULT - PROBLEM SELECTOR PROBLEM 3
Acute on chronic congestive heart failure, unspecified heart failure type
Acute on chronic congestive heart failure, unspecified heart failure type
Cirrhosis of liver with ascites, unspecified hepatic cirrhosis type

## 2020-12-31 NOTE — PROGRESS NOTE ADULT - ASSESSMENT
81yo F with pmh of chf, DM2, HTN, HLD presented to ED c/o worsening SOB, b/l LE swellings and orthopnea and cough for the past several days. Pt reports to sob even at rest and the swellings in her legs has been causing her difficulty with walking. She saw her PMD who told her that she has fluid in her lung, and sent her to ED. Denies cp, fever, chills, n/v. In the ED found to have elevated BNP 19690, CT chest with large right pleural effusion.     Acute decompensated systolic HF with large rt pleural effusion    s/p chest tube placement and subsequent removal    c/w IV lasix x24-48 hrs    liver cirrhosis with ascites: likely 2/2 heart disease    see above    Severe AS With hypodensity MV   s/p JOE EF 30% mod MR, mod TR, severe AS but heavily calcified, dilated prox ascending aorta (3.6cm).   cath with nonobstructive cad   TAVR electively, undergoing pre op workup currently    Diabetes   -hold home po meds, start lantus  -ISS, hypoglycemic protocol, FSG  -A1c 6.7    HTN/HLD  -cont with Lisinopril and metoprolol with holding parameter   -cont with atorvastatin 20mg daily     dizziness: may related to severe AS    negative CT head, prn meclizine.    DVT ppx   -heparin    dispo: [facility] in 24-48 hrs  patient agreeable .  cm/sw updated

## 2020-12-31 NOTE — PROGRESS NOTE ADULT - SUBJECTIVE AND OBJECTIVE BOX
seen for HF, severe AS    no acute complaints  sacral pain--noted to have stage 1 decub and irritated skin (RN updated)  ros otherwise negative     MEDICATIONS  (STANDING):  aspirin enteric coated 81 milliGRAM(s) Oral daily  atorvastatin 20 milliGRAM(s) Oral at bedtime  cholecalciferol 1000 Unit(s) Oral daily  dextrose 40% Gel 15 Gram(s) Oral once  dextrose 5%. 1000 milliLiter(s) (50 mL/Hr) IV Continuous <Continuous>  dextrose 5%. 1000 milliLiter(s) (100 mL/Hr) IV Continuous <Continuous>  dextrose 50% Injectable 25 Gram(s) IV Push once  dextrose 50% Injectable 12.5 Gram(s) IV Push once  dextrose 50% Injectable 25 Gram(s) IV Push once  furosemide   Injectable 40 milliGRAM(s) IV Push once  glucagon  Injectable 1 milliGRAM(s) IntraMuscular once  heparin   Injectable 5000 Unit(s) SubCutaneous every 12 hours  influenza   Vaccine 0.5 milliLiter(s) IntraMuscular once  insulin glargine Injectable (LANTUS) 15 Unit(s) SubCutaneous at bedtime  insulin lispro (ADMELOG) corrective regimen sliding scale   SubCutaneous three times a day before meals  lidocaine 1% (Preservative-free) Injectable 1 milliLiter(s) Local Injection once  lisinopril 2.5 milliGRAM(s) Oral daily  metoprolol succinate ER 25 milliGRAM(s) Oral daily  multivitamin 1 Tablet(s) Oral daily  mupirocin 2% Ointment 1 Application(s) Topical two times a day  oxybutynin 5 milliGRAM(s) Oral daily  pantoprazole    Tablet 40 milliGRAM(s) Oral before breakfast  potassium chloride    Tablet ER 40 milliEquivalent(s) Oral once    MEDICATIONS  (PRN):  acetaminophen   Tablet .. 650 milliGRAM(s) Oral every 6 hours PRN Mild Pain (1 - 3)  benzocaine 15 mG/menthol 3.6 mG (Sugar-Free) Lozenge 1 Lozenge Oral four times a day PRN Sore Throat  hemorrhoidal Ointment 1 Application(s) Rectal every 6 hours PRN rectal pain  hydrocortisone hemorrhoidal Suppository 1 Suppository(s) Rectal daily PRN hemorrhoidal pain  meclizine 12.5 milliGRAM(s) Oral three times a day PRN Dizziness      Allergies    No Known Allergies      Vital Signs Last 24 Hrs  T(C): 36.6 (31 Dec 2020 09:11), Max: 36.7 (31 Dec 2020 04:14)  T(F): 97.8 (31 Dec 2020 09:11), Max: 98 (31 Dec 2020 04:14)  HR: 78 (31 Dec 2020 09:11) (78 - 80)  BP: 100/65 (31 Dec 2020 09:11) (92/62 - 103/68)  BP(mean): --  RR: 18 (31 Dec 2020 09:11) (16 - 18)  SpO2: 100% (31 Dec 2020 09:11) (94% - 100%)    PHYSICAL EXAM:    GENERAL: NAD  CHEST/LUNG: Clear to percussion bilaterally  HEART: Regular rate and rhythm; S1 S2  ABDOMEN: Soft, Nontender,  Bowel sounds present  EXTREMITIES: +1 edema   NERVOUS SYSTEM:  Alert & Oriented X3, motor Strength 5/5 B/L upper and lower extremities      LABS:                        9.5    5.77  )-----------( 166      ( 31 Dec 2020 15:40 )             31.8     12-30    135  |  90<L>  |  28.0<H>  ----------------------------<  214<H>  3.4<L>   |  31.0<H>  |  0.57    Ca    9.2      30 Dec 2020 08:04  Phos  3.2     12-30  Mg     1.6     12-30    TPro  6.5<L>  /  Alb  3.1<L>  /  TBili  1.4  /  DBili  x   /  AST  14  /  ALT  6   /  AlkPhos  78  12-30    PT/INR - ( 30 Dec 2020 08:04 )   PT: 14.9 sec;   INR: 1.30 ratio         PTT - ( 30 Dec 2020 08:04 )  PTT:29.7 sec  Urinalysis Basic - ( 29 Dec 2020 20:03 )    Color: Yellow / Appearance: Clear / S.015 / pH: x  Gluc: x / Ketone: Trace  / Bili: Negative / Urobili: 1   Blood: x / Protein: 30 mg/dL / Nitrite: Positive   Leuk Esterase: Moderate / RBC: 3-5 /HPF / WBC 11-25   Sq Epi: x / Non Sq Epi: Occasional / Bacteria: TNTC        CAPILLARY BLOOD GLUCOSE      POCT Blood Glucose.: 187 mg/dL (31 Dec 2020 12:09)  POCT Blood Glucose.: 153 mg/dL (31 Dec 2020 08:00)  POCT Blood Glucose.: 251 mg/dL (30 Dec 2020 21:27)  POCT Blood Glucose.: 248 mg/dL (30 Dec 2020 17:10)        RADIOLOGY & ADDITIONAL TESTS:

## 2020-12-31 NOTE — PROGRESS NOTE ADULT - ASSESSMENT
80y Female with PMH vertigo, HTN, DM, and chronic combined systolic and diastolic heart failure.  Presented to ED 12/22 c/o worsening SOB, b/l LE swellings and orthopnea and cough for several days. Pt reported sob even at rest and the swellings in her legs has been causing her difficulty with walking. She saw her PMD who told her that she has fluid in her lung, and sent her to ED. In the ED found to have elevated BNP 78044. On IV Lasix for diuresis.   Thoracic surgery was consulted after CT chest reveals large right pleural effusion.  A right pigtail was placed and then removed a few days later.  On TTE 12/23 EF 35-40%,  Possible mobile echodensity on MV, Mod to Sev MR, Mod pulm HTN, Mild AI, Severe AS with FLOYD 0.35 and mean gradient of 65.  JOE today showed EF 30%, Mod MR, Mod TR, Severe AS, FLOYD 0.33, mean gradient 58, Mild AI, and a small pericardial effusion.  Called by cardio to eval for Severe AS.

## 2021-01-01 ENCOUNTER — TRANSCRIPTION ENCOUNTER (OUTPATIENT)
Age: 81
End: 2021-01-01

## 2021-01-01 VITALS
DIASTOLIC BLOOD PRESSURE: 64 MMHG | OXYGEN SATURATION: 96 % | RESPIRATION RATE: 18 BRPM | HEART RATE: 77 BPM | TEMPERATURE: 98 F | SYSTOLIC BLOOD PRESSURE: 100 MMHG

## 2021-01-01 LAB
AFP-TM SERPL-MCNC: <1.8 NG/ML — SIGNIFICANT CHANGE UP
ANION GAP SERPL CALC-SCNC: 13 MMOL/L — SIGNIFICANT CHANGE UP (ref 5–17)
BUN SERPL-MCNC: 25 MG/DL — HIGH (ref 8–20)
CALCIUM SERPL-MCNC: 8.9 MG/DL — SIGNIFICANT CHANGE UP (ref 8.6–10.2)
CHLORIDE SERPL-SCNC: 91 MMOL/L — LOW (ref 98–107)
CO2 SERPL-SCNC: 31 MMOL/L — HIGH (ref 22–29)
CREAT SERPL-MCNC: 0.56 MG/DL — SIGNIFICANT CHANGE UP (ref 0.5–1.3)
GLUCOSE BLDC GLUCOMTR-MCNC: 135 MG/DL — HIGH (ref 70–99)
GLUCOSE BLDC GLUCOMTR-MCNC: 154 MG/DL — HIGH (ref 70–99)
GLUCOSE SERPL-MCNC: 138 MG/DL — HIGH (ref 70–99)
HAV IGG SER QL IA: SIGNIFICANT CHANGE UP
HAV IGM SER-ACNC: SIGNIFICANT CHANGE UP
HBV CORE AB SER-ACNC: SIGNIFICANT CHANGE UP
HBV SURFACE AB SER-ACNC: SIGNIFICANT CHANGE UP
HBV SURFACE AG SER-ACNC: SIGNIFICANT CHANGE UP
HCV AB S/CO SERPL IA: 0.07 S/CO — SIGNIFICANT CHANGE UP (ref 0–0.99)
HCV AB SERPL-IMP: SIGNIFICANT CHANGE UP
POTASSIUM SERPL-MCNC: 3.6 MMOL/L — SIGNIFICANT CHANGE UP (ref 3.5–5.3)
POTASSIUM SERPL-SCNC: 3.6 MMOL/L — SIGNIFICANT CHANGE UP (ref 3.5–5.3)
PROT SERPL-MCNC: 6.3 G/DL — SIGNIFICANT CHANGE UP (ref 6–8.3)
PROT SERPL-MCNC: 6.3 G/DL — SIGNIFICANT CHANGE UP (ref 6–8.3)
SARS-COV-2 RNA SPEC QL NAA+PROBE: SIGNIFICANT CHANGE UP
SODIUM SERPL-SCNC: 135 MMOL/L — SIGNIFICANT CHANGE UP (ref 135–145)

## 2021-01-01 PROCEDURE — 99239 HOSP IP/OBS DSCHRG MGMT >30: CPT

## 2021-01-01 RX ORDER — METOPROLOL TARTRATE 50 MG
1 TABLET ORAL
Qty: 0 | Refills: 0 | DISCHARGE
Start: 2021-01-01

## 2021-01-01 RX ORDER — MUPIROCIN 20 MG/G
1 OINTMENT TOPICAL
Qty: 0 | Refills: 0 | DISCHARGE
Start: 2021-01-01

## 2021-01-01 RX ORDER — MECLIZINE HCL 12.5 MG
1 TABLET ORAL
Qty: 0 | Refills: 0 | DISCHARGE
Start: 2021-01-01

## 2021-01-01 RX ORDER — FUROSEMIDE 40 MG
1 TABLET ORAL
Qty: 0 | Refills: 0 | DISCHARGE

## 2021-01-01 RX ORDER — MECLIZINE HCL 12.5 MG
1 TABLET ORAL
Qty: 0 | Refills: 0 | DISCHARGE

## 2021-01-01 RX ORDER — ASPIRIN/CALCIUM CARB/MAGNESIUM 324 MG
1 TABLET ORAL
Qty: 0 | Refills: 0 | DISCHARGE
Start: 2021-01-01

## 2021-01-01 RX ORDER — METOPROLOL TARTRATE 50 MG
1 TABLET ORAL
Qty: 0 | Refills: 0 | DISCHARGE

## 2021-01-01 RX ORDER — POTASSIUM CHLORIDE 20 MEQ
1 PACKET (EA) ORAL
Qty: 0 | Refills: 0 | DISCHARGE

## 2021-01-01 RX ORDER — POTASSIUM CHLORIDE 20 MEQ
1 PACKET (EA) ORAL
Qty: 0 | Refills: 0 | DISCHARGE
Start: 2021-01-01

## 2021-01-01 RX ORDER — POTASSIUM CHLORIDE 20 MEQ
10 PACKET (EA) ORAL DAILY
Refills: 0 | Status: DISCONTINUED | OUTPATIENT
Start: 2021-01-01 | End: 2021-01-01

## 2021-01-01 RX ORDER — ACETAMINOPHEN 500 MG
2 TABLET ORAL
Qty: 0 | Refills: 0 | DISCHARGE
Start: 2021-01-01

## 2021-01-01 RX ORDER — FUROSEMIDE 40 MG
1 TABLET ORAL
Qty: 0 | Refills: 0 | DISCHARGE
Start: 2021-01-01

## 2021-01-01 RX ADMIN — Medication 1 TABLET(S): at 09:15

## 2021-01-01 RX ADMIN — LISINOPRIL 2.5 MILLIGRAM(S): 2.5 TABLET ORAL at 05:42

## 2021-01-01 RX ADMIN — PANTOPRAZOLE SODIUM 40 MILLIGRAM(S): 20 TABLET, DELAYED RELEASE ORAL at 05:40

## 2021-01-01 RX ADMIN — Medication 5 MILLIGRAM(S): at 09:15

## 2021-01-01 RX ADMIN — Medication 10 MILLIEQUIVALENT(S): at 12:29

## 2021-01-01 RX ADMIN — Medication 1000 UNIT(S): at 09:15

## 2021-01-01 RX ADMIN — Medication 1: at 09:14

## 2021-01-01 RX ADMIN — MUPIROCIN 1 APPLICATION(S): 20 OINTMENT TOPICAL at 05:41

## 2021-01-01 RX ADMIN — Medication 40 MILLIGRAM(S): at 05:40

## 2021-01-01 RX ADMIN — HEPARIN SODIUM 5000 UNIT(S): 5000 INJECTION INTRAVENOUS; SUBCUTANEOUS at 05:40

## 2021-01-01 NOTE — DISCHARGE NOTE PROVIDER - NSDCCPCAREPLAN_GEN_ALL_CORE_FT
PRINCIPAL DISCHARGE DIAGNOSIS  Diagnosis: Acute on chronic combined systolic (congestive) and diastolic (congestive) heart failure  Assessment and Plan of Treatment: continue lasix, daily weights  follow up with cardiology in 1 week.        SECONDARY DISCHARGE DIAGNOSES  Diagnosis: Severe aortic stenosis  Assessment and Plan of Treatment: follow up with CT surgery Dr Harrison in 1-2 weeks for TAVR evaluation    Diagnosis: Type 2 diabetes mellitus without complication, with long-term current use of insulin  Assessment and Plan of Treatment: hga1c 6.7   restart home regimen.    Diagnosis: Pleural effusion  Assessment and Plan of Treatment: resolved after chest tube drainage.

## 2021-01-01 NOTE — DISCHARGE NOTE NURSING/CASE MANAGEMENT/SOCIAL WORK - PATIENT PORTAL LINK FT
You can access the FollowMyHealth Patient Portal offered by [facility] by registering at the following website: [email or web address]. By joining [employer]’s FollowMyHealth portal, you will also be able to view your health information using other applications (apps) compatible with our system.

## 2021-01-01 NOTE — DISCHARGE NOTE PROVIDER - HOSPITAL COURSE
81yo F with pmh of chf, DM2, HTN, HLD presented to ED c/o worsening SOB, b/l LE swellings and orthopnea and cough for the past several days. Pt reports to sob even at rest and the swellings in her legs has been causing her difficulty with walking. She saw her PMD who told her that she has fluid in her lung, and sent her to ED. Denies cp, fever, chills, n/v. In the ED found to have elevated BNP 19690, CT chest with large right pleural effusion.    Patient treated for acute systolic heart failure with IV lasix and transitioned to oral lasix by cardiology. chest tube placed and subsequently removed. Noted to have severe AS on echo and confirmed on JOE. nonobstructive cath. CT surgery recommending Subacute rehab for strengthening prior to TAVR. underwent TAVR workup. incidently found to have liver cirrhosis likely due to heart diseases.   sacral pain due to beginning of sacral decub.     dc to subacute rehab  dc planning 35 minutes.  vitals stable sbp 90- low 100s but stable   aaox3 nad rrr s1s2 ctab soft abdomen +1 edema lower legs. nonfocal neuro gen weakness.

## 2021-01-01 NOTE — DISCHARGE NOTE PROVIDER - CARE PROVIDERS DIRECT ADDRESSES
[email or web address]

## 2021-01-01 NOTE — DISCHARGE NOTE PROVIDER - NSDCMRMEDTOKEN_GEN_ALL_CORE_FT
acetaminophen 325 mg oral tablet: 2 tab(s) orally every 6 hours, As needed, Mild Pain (1 - 3)  aspirin 81 mg oral delayed release tablet: 1 tab(s) orally once a day  furosemide 40 mg oral tablet: 1 tab(s) orally once a day  glipiZIDE 10 mg oral tablet: 1 tab(s) orally once a day  lisinopril 2.5 mg oral tablet: 1 tab(s) orally once a day  meclizine 12.5 mg oral tablet: 1 tab(s) orally 3 times a day, As needed, Dizziness  metFORMIN 500 mg oral tablet, extended release: 1 tab(s) orally once a day  metoprolol succinate 25 mg oral tablet, extended release: 1 tab(s) orally once a day  Multiple Vitamins oral tablet: 1 tab(s) orally once a day  mupirocin 2% topical ointment: 1 application topically 2 times a day x5 days  MRSA in nares  omeprazole 20 mg oral delayed release capsule: 1 cap(s) orally once a day  oxybutynin 5 mg/24 hours oral tablet, extended release:   potassium chloride 10 mEq oral tablet, extended release: 1 tab(s) orally once a day  rosuvastatin 5 mg oral tablet: 1 tab(s) orally once a day  Vitamin D3 1000 intl units (25 mcg) oral capsule: 1 tab(s) orally once a day

## 2021-01-02 LAB
-  AMIKACIN: SIGNIFICANT CHANGE UP
-  AMOXICILLIN/CLAVULANIC ACID: SIGNIFICANT CHANGE UP
-  AMPICILLIN/SULBACTAM: SIGNIFICANT CHANGE UP
-  AMPICILLIN: SIGNIFICANT CHANGE UP
-  AZTREONAM: SIGNIFICANT CHANGE UP
-  CEFAZOLIN: SIGNIFICANT CHANGE UP
-  CEFEPIME: SIGNIFICANT CHANGE UP
-  CEFOXITIN: SIGNIFICANT CHANGE UP
-  CEFTRIAXONE: SIGNIFICANT CHANGE UP
-  CIPROFLOXACIN: SIGNIFICANT CHANGE UP
-  ERTAPENEM: SIGNIFICANT CHANGE UP
-  GENTAMICIN: SIGNIFICANT CHANGE UP
-  IMIPENEM: SIGNIFICANT CHANGE UP
-  LEVOFLOXACIN: SIGNIFICANT CHANGE UP
-  MEROPENEM: SIGNIFICANT CHANGE UP
-  NITROFURANTOIN: SIGNIFICANT CHANGE UP
-  PIPERACILLIN/TAZOBACTAM: SIGNIFICANT CHANGE UP
-  TIGECYCLINE: SIGNIFICANT CHANGE UP
-  TOBRAMYCIN: SIGNIFICANT CHANGE UP
-  TRIMETHOPRIM/SULFAMETHOXAZOLE: SIGNIFICANT CHANGE UP
ANA TITR SER: NEGATIVE — SIGNIFICANT CHANGE UP
CULTURE RESULTS: SIGNIFICANT CHANGE UP
LKM AB SER-ACNC: <20.1 UNITS — SIGNIFICANT CHANGE UP (ref 0–20)
METHOD TYPE: SIGNIFICANT CHANGE UP
ORGANISM # SPEC MICROSCOPIC CNT: SIGNIFICANT CHANGE UP
ORGANISM # SPEC MICROSCOPIC CNT: SIGNIFICANT CHANGE UP
SPECIMEN SOURCE: SIGNIFICANT CHANGE UP

## 2021-01-04 LAB
% ALBUMIN: 47.7 % — SIGNIFICANT CHANGE UP
% ALPHA 1: 7.2 % — SIGNIFICANT CHANGE UP
% ALPHA 2: 15.1 % — SIGNIFICANT CHANGE UP
% BETA: 13.9 % — SIGNIFICANT CHANGE UP
% GAMMA: 16.1 % — SIGNIFICANT CHANGE UP
ALBUMIN SERPL ELPH-MCNC: 3 G/DL — LOW (ref 3.6–5.5)
ALBUMIN/GLOB SERPL ELPH: 0.9 RATIO — SIGNIFICANT CHANGE UP
ALPHA1 GLOB SERPL ELPH-MCNC: 0.5 G/DL — HIGH (ref 0.1–0.4)
ALPHA2 GLOB SERPL ELPH-MCNC: 1 G/DL — SIGNIFICANT CHANGE UP (ref 0.5–1)
B-GLOBULIN SERPL ELPH-MCNC: 0.9 G/DL — SIGNIFICANT CHANGE UP (ref 0.5–1)
GAMMA GLOBULIN: 1 G/DL — SIGNIFICANT CHANGE UP (ref 0.6–1.6)
INTERPRETATION SERPL IFE-IMP: SIGNIFICANT CHANGE UP
MITOCHONDRIA AB SER-ACNC: SIGNIFICANT CHANGE UP
PROT PATTERN SERPL ELPH-IMP: SIGNIFICANT CHANGE UP
SMOOTH MUSCLE AB SER-ACNC: ABNORMAL

## 2021-01-13 RX ORDER — LISINOPRIL 2.5 MG/1
1 TABLET ORAL
Qty: 0 | Refills: 0 | DISCHARGE

## 2021-01-18 PROCEDURE — 82945 GLUCOSE OTHER FLUID: CPT

## 2021-01-18 PROCEDURE — 83735 ASSAY OF MAGNESIUM: CPT

## 2021-01-18 PROCEDURE — 84443 ASSAY THYROID STIM HORMONE: CPT

## 2021-01-18 PROCEDURE — 86850 RBC ANTIBODY SCREEN: CPT

## 2021-01-18 PROCEDURE — 85610 PROTHROMBIN TIME: CPT

## 2021-01-18 PROCEDURE — 87340 HEPATITIS B SURFACE AG IA: CPT

## 2021-01-18 PROCEDURE — 84100 ASSAY OF PHOSPHORUS: CPT

## 2021-01-18 PROCEDURE — 84484 ASSAY OF TROPONIN QUANT: CPT

## 2021-01-18 PROCEDURE — 83880 ASSAY OF NATRIURETIC PEPTIDE: CPT

## 2021-01-18 PROCEDURE — 86769 SARS-COV-2 COVID-19 ANTIBODY: CPT

## 2021-01-18 PROCEDURE — 93325 DOPPLER ECHO COLOR FLOW MAPG: CPT

## 2021-01-18 PROCEDURE — 80048 BASIC METABOLIC PNL TOTAL CA: CPT

## 2021-01-18 PROCEDURE — 88305 TISSUE EXAM BY PATHOLOGIST: CPT

## 2021-01-18 PROCEDURE — 84466 ASSAY OF TRANSFERRIN: CPT

## 2021-01-18 PROCEDURE — 93320 DOPPLER ECHO COMPLETE: CPT

## 2021-01-18 PROCEDURE — 74174 CTA ABD&PLVS W/CONTRAST: CPT

## 2021-01-18 PROCEDURE — 87086 URINE CULTURE/COLONY COUNT: CPT

## 2021-01-18 PROCEDURE — 93460 R&L HRT ART/VENTRICLE ANGIO: CPT

## 2021-01-18 PROCEDURE — 87186 SC STD MICRODIL/AGAR DIL: CPT

## 2021-01-18 PROCEDURE — 86900 BLOOD TYPING SEROLOGIC ABO: CPT

## 2021-01-18 PROCEDURE — 71046 X-RAY EXAM CHEST 2 VIEWS: CPT

## 2021-01-18 PROCEDURE — 84134 ASSAY OF PREALBUMIN: CPT

## 2021-01-18 PROCEDURE — 85730 THROMBOPLASTIN TIME PARTIAL: CPT

## 2021-01-18 PROCEDURE — 86376 MICROSOMAL ANTIBODY EACH: CPT

## 2021-01-18 PROCEDURE — 99153 MOD SED SAME PHYS/QHP EA: CPT

## 2021-01-18 PROCEDURE — 86038 ANTINUCLEAR ANTIBODIES: CPT

## 2021-01-18 PROCEDURE — 82042 OTHER SOURCE ALBUMIN QUAN EA: CPT

## 2021-01-18 PROCEDURE — 93312 ECHO TRANSESOPHAGEAL: CPT

## 2021-01-18 PROCEDURE — 96374 THER/PROPH/DIAG INJ IV PUSH: CPT

## 2021-01-18 PROCEDURE — 87070 CULTURE OTHR SPECIMN AEROBIC: CPT

## 2021-01-18 PROCEDURE — 99285 EMERGENCY DEPT VISIT HI MDM: CPT | Mod: 25

## 2021-01-18 PROCEDURE — 87641 MR-STAPH DNA AMP PROBE: CPT

## 2021-01-18 PROCEDURE — 76700 US EXAM ABDOM COMPLETE: CPT

## 2021-01-18 PROCEDURE — 93306 TTE W/DOPPLER COMPLETE: CPT

## 2021-01-18 PROCEDURE — 99152 MOD SED SAME PHYS/QHP 5/>YRS: CPT

## 2021-01-18 PROCEDURE — 87205 SMEAR GRAM STAIN: CPT

## 2021-01-18 PROCEDURE — 93880 EXTRACRANIAL BILAT STUDY: CPT

## 2021-01-18 PROCEDURE — 71275 CT ANGIOGRAPHY CHEST: CPT

## 2021-01-18 PROCEDURE — 89051 BODY FLUID CELL COUNT: CPT

## 2021-01-18 PROCEDURE — 86901 BLOOD TYPING SEROLOGIC RH(D): CPT

## 2021-01-18 PROCEDURE — 82105 ALPHA-FETOPROTEIN SERUM: CPT

## 2021-01-18 PROCEDURE — 86704 HEP B CORE ANTIBODY TOTAL: CPT

## 2021-01-18 PROCEDURE — 88112 CYTOPATH CELL ENHANCE TECH: CPT

## 2021-01-18 PROCEDURE — 94010 BREATHING CAPACITY TEST: CPT

## 2021-01-18 PROCEDURE — 82962 GLUCOSE BLOOD TEST: CPT

## 2021-01-18 PROCEDURE — 86803 HEPATITIS C AB TEST: CPT

## 2021-01-18 PROCEDURE — 84157 ASSAY OF PROTEIN OTHER: CPT

## 2021-01-18 PROCEDURE — 86334 IMMUNOFIX E-PHORESIS SERUM: CPT

## 2021-01-18 PROCEDURE — C1889: CPT

## 2021-01-18 PROCEDURE — 81001 URINALYSIS AUTO W/SCOPE: CPT

## 2021-01-18 PROCEDURE — 87102 FUNGUS ISOLATION CULTURE: CPT

## 2021-01-18 PROCEDURE — 86708 HEPATITIS A ANTIBODY: CPT

## 2021-01-18 PROCEDURE — 86709 HEPATITIS A IGM ANTIBODY: CPT

## 2021-01-18 PROCEDURE — 71250 CT THORAX DX C-: CPT

## 2021-01-18 PROCEDURE — 86381 MITOCHONDRIAL ANTIBODY EACH: CPT

## 2021-01-18 PROCEDURE — 83550 IRON BINDING TEST: CPT

## 2021-01-18 PROCEDURE — U0003: CPT

## 2021-01-18 PROCEDURE — 87075 CULTR BACTERIA EXCEPT BLOOD: CPT

## 2021-01-18 PROCEDURE — 87640 STAPH A DNA AMP PROBE: CPT

## 2021-01-18 PROCEDURE — 71045 X-RAY EXAM CHEST 1 VIEW: CPT

## 2021-01-18 PROCEDURE — 83036 HEMOGLOBIN GLYCOSYLATED A1C: CPT

## 2021-01-18 PROCEDURE — 83986 ASSAY PH BODY FLUID NOS: CPT

## 2021-01-18 PROCEDURE — 0225U NFCT DS DNA&RNA 21 SARSCOV2: CPT

## 2021-01-18 PROCEDURE — 83615 LACTATE (LD) (LDH) ENZYME: CPT

## 2021-01-18 PROCEDURE — 85025 COMPLETE CBC W/AUTO DIFF WBC: CPT

## 2021-01-18 PROCEDURE — 84155 ASSAY OF PROTEIN SERUM: CPT

## 2021-01-18 PROCEDURE — 86255 FLUORESCENT ANTIBODY SCREEN: CPT

## 2021-01-18 PROCEDURE — C1887: CPT

## 2021-01-18 PROCEDURE — C1769: CPT

## 2021-01-18 PROCEDURE — 97163 PT EVAL HIGH COMPLEX 45 MIN: CPT

## 2021-01-18 PROCEDURE — 84165 PROTEIN E-PHORESIS SERUM: CPT

## 2021-01-18 PROCEDURE — 83540 ASSAY OF IRON: CPT

## 2021-01-18 PROCEDURE — 82728 ASSAY OF FERRITIN: CPT

## 2021-01-18 PROCEDURE — C1894: CPT

## 2021-01-18 PROCEDURE — 93005 ELECTROCARDIOGRAM TRACING: CPT

## 2021-01-18 PROCEDURE — 85027 COMPLETE CBC AUTOMATED: CPT

## 2021-01-18 PROCEDURE — 86706 HEP B SURFACE ANTIBODY: CPT

## 2021-01-18 PROCEDURE — 93925 LOWER EXTREMITY STUDY: CPT

## 2021-01-18 PROCEDURE — U0005: CPT

## 2021-01-18 PROCEDURE — 36415 COLL VENOUS BLD VENIPUNCTURE: CPT

## 2021-01-18 PROCEDURE — 80053 COMPREHEN METABOLIC PANEL: CPT

## 2021-01-18 PROCEDURE — 80061 LIPID PANEL: CPT

## 2021-01-18 PROCEDURE — 70450 CT HEAD/BRAIN W/O DYE: CPT

## 2021-01-19 RX ORDER — ROSUVASTATIN CALCIUM 5 MG/1
1 TABLET ORAL
Qty: 0 | Refills: 0 | DISCHARGE

## 2021-01-19 RX ORDER — OMEPRAZOLE 10 MG/1
1 CAPSULE, DELAYED RELEASE ORAL
Qty: 0 | Refills: 0 | DISCHARGE

## 2021-01-19 RX ORDER — METFORMIN HYDROCHLORIDE 850 MG/1
1 TABLET ORAL
Qty: 0 | Refills: 0 | DISCHARGE

## 2021-01-19 RX ORDER — OXYBUTYNIN CHLORIDE 5 MG
0 TABLET ORAL
Qty: 0 | Refills: 0 | DISCHARGE

## 2021-01-19 RX ORDER — CHOLECALCIFEROL (VITAMIN D3) 125 MCG
1 CAPSULE ORAL
Qty: 0 | Refills: 0 | DISCHARGE

## 2021-01-20 LAB
CULTURE RESULTS: SIGNIFICANT CHANGE UP
SPECIMEN SOURCE: SIGNIFICANT CHANGE UP

## 2021-01-26 DIAGNOSIS — Z87.898 PERSONAL HISTORY OF OTHER SPECIFIED CONDITIONS: ICD-10-CM

## 2021-01-26 DIAGNOSIS — I35.2 NONRHEUMATIC AORTIC (VALVE) STENOSIS WITH INSUFFICIENCY: ICD-10-CM

## 2021-01-26 DIAGNOSIS — I35.0 NONRHEUMATIC AORTIC (VALVE) STENOSIS: ICD-10-CM

## 2021-01-26 DIAGNOSIS — R06.02 SHORTNESS OF BREATH: ICD-10-CM

## 2021-01-26 DIAGNOSIS — K76.9 LIVER DISEASE, UNSPECIFIED: ICD-10-CM

## 2021-01-26 DIAGNOSIS — I50.22 CHRONIC SYSTOLIC (CONGESTIVE) HEART FAILURE: ICD-10-CM

## 2021-01-26 DIAGNOSIS — R42 DIZZINESS AND GIDDINESS: ICD-10-CM

## 2021-01-26 DIAGNOSIS — I50.32 CHRONIC DIASTOLIC (CONGESTIVE) HEART FAILURE: ICD-10-CM

## 2021-01-26 DIAGNOSIS — J90 PLEURAL EFFUSION, NOT ELSEWHERE CLASSIFIED: ICD-10-CM

## 2021-01-26 DIAGNOSIS — Z82.49 FAMILY HISTORY OF ISCHEMIC HEART DISEASE AND OTHER DISEASES OF THE CIRCULATORY SYSTEM: ICD-10-CM

## 2021-01-26 DIAGNOSIS — K74.60 UNSPECIFIED CIRRHOSIS OF LIVER: ICD-10-CM

## 2021-01-26 DIAGNOSIS — I07.1 RHEUMATIC TRICUSPID INSUFFICIENCY: ICD-10-CM

## 2021-01-26 DIAGNOSIS — I27.20 PULMONARY HYPERTENSION, UNSPECIFIED: ICD-10-CM

## 2021-01-26 DIAGNOSIS — R18.8 UNSPECIFIED CIRRHOSIS OF LIVER: ICD-10-CM

## 2021-01-26 DIAGNOSIS — E11.9 TYPE 2 DIABETES MELLITUS W/OUT COMPLICATIONS: ICD-10-CM

## 2021-01-26 DIAGNOSIS — I10 ESSENTIAL (PRIMARY) HYPERTENSION: ICD-10-CM

## 2021-01-26 DIAGNOSIS — R60.0 LOCALIZED EDEMA: ICD-10-CM

## 2021-01-26 DIAGNOSIS — I34.0 NONRHEUMATIC MITRAL (VALVE) INSUFFICIENCY: ICD-10-CM

## 2021-01-27 ENCOUNTER — APPOINTMENT (OUTPATIENT)
Dept: CARDIOLOGY | Facility: CLINIC | Age: 81
End: 2021-01-27

## 2021-02-05 ENCOUNTER — APPOINTMENT (OUTPATIENT)
Dept: CARDIOTHORACIC SURGERY | Facility: HOSPITAL | Age: 81
End: 2021-02-05

## 2025-06-26 NOTE — INPATIENT CERTIFICATION FOR MEDICARE PATIENTS - THE STATUS OF COMORBIDITIES.
Asymptomatic, severe AS, cardiology plan is to check her every 6 months with echo        2. The status of comorbities. (See ED/admit documents)